# Patient Record
Sex: MALE | Race: BLACK OR AFRICAN AMERICAN | Employment: OTHER | URBAN - METROPOLITAN AREA
[De-identification: names, ages, dates, MRNs, and addresses within clinical notes are randomized per-mention and may not be internally consistent; named-entity substitution may affect disease eponyms.]

---

## 2018-04-28 PROBLEM — R55 SYNCOPE, NEAR: Status: ACTIVE | Noted: 2018-04-28

## 2018-08-16 ENCOUNTER — HOSPITAL ENCOUNTER (OUTPATIENT)
Dept: PHYSICAL THERAPY | Age: 63
Setting detail: THERAPIES SERIES
Discharge: HOME | End: 2018-08-16
Payer: OTHER GOVERNMENT

## 2018-08-22 ENCOUNTER — HOSPITAL ENCOUNTER (OUTPATIENT)
Dept: PHYSICAL THERAPY | Age: 63
Setting detail: THERAPIES SERIES
Discharge: HOME | End: 2018-08-22
Payer: OTHER GOVERNMENT

## 2018-08-22 PROCEDURE — G8979 MOBILITY GOAL STATUS: HCPCS

## 2018-08-22 PROCEDURE — 97530 THERAPEUTIC ACTIVITIES: CPT

## 2018-08-22 PROCEDURE — 97110 THERAPEUTIC EXERCISES: CPT

## 2018-08-22 PROCEDURE — G8978 MOBILITY CURRENT STATUS: HCPCS

## 2018-08-22 PROCEDURE — 97162 PT EVAL MOD COMPLEX 30 MIN: CPT

## 2018-08-22 NOTE — FLOWSHEET NOTE
abd add                             Gait train add           Other Therapeutic Activities:  Discussed pt findings and treatment - pt verbalized good understanding    Home Exercise Program:  Pt. demonstrated good understanding and knowledge of HEP. Written instructions provided.     08/22/18: sitting TrA isometric    Manual Treatments: NA     Modalities:  NA    Timed Code Treatment Minutes:  TE: 10, TA: 15    Total Treatment Minutes:  60 (+eval mod)    Treatment/Activity Tolerance:  [x] Patient tolerated treatment well [] Patient limited by fatigue  [] Patient limited by pain  [] Patient limited by other medical complications  [] Other:     Assessment:  Pt presents with LBP, decreased strength, balance, gait deficits, increased time for transfers limiting N walking, standing, sitting, reaching     Prognosis: [x] Good [] Fair  [] Poor    Patient Requires Follow-up: [x] Yes  [] No    Goals:  Short term goals  Time Frame for Short term goals: 3 weeks  Short term goal 1: Pt will demo good understanding and compliance of initial HEP  Short term goal 2: Decreased LBP 6/10 at worst to allow for improved tolerance to sitting  Short term goal 3: TUG = 39 seconds with 2WW for decreased fall risk  Short term goal 4: R shoulder AROM WFLs without increased back pain for N reaching  Long term goals  Time Frame for Long term goals : 6 weeks  Long term goal 1: Pt will demo good understanding and compliance of HEP progressions  Long term goal 2: Decreased LBP to 2/10 at worst to allow for sitting tolerance with rare complaints  Long term goal 3: B hip flexion 4/5, B quads with no quad lag with LAQ, B hip abd 5/5 in sitting to allow for easier sit<>stand transfers - decreased time to complete and improved hip flexion with gait with 2WW  Long term goal 4: TUG = 33 seconds with 2WW for decreased fall risk  Long term goal 5: Decreased impairment per modified oswestry <20% impaired    Plan:   [] Continue per plan of care [] Alter current plan (see comments)  [x] Plan of care initiated [] Hold pending MD visit [] Discharge    Plan for Next Session:  Review HEP, add exercises as tolerated    Electronically signed by:   Dane Butts DPT [id number]

## 2018-08-22 NOTE — PLAN OF CARE
Outpatient Physical Therapy  Phone: [phone] Fax: [phone]    To: Referring Practitioner: Dr. Francisco Vera  From: Lara Miller, PT, DPT [id number]   Date: 2018  Patient: Ewa Gil     : 1955 MRN: [id number]  Diagnosis: Diagnosis: chronic LBP M54.5   Treatment Diagnosis: Treatment Diagnosis: LBP s/p fall and kyphoplasty     Physical Therapy Certification/Re-Certification Form  Dear Dr. Judith Rogers,   The following patient has been evaluated for physical therapy services and for therapy to continue, Medicare requires monthly physician review of the treatment plan. Please review the attached evaluation and/or summary of the patient's plan of care, and verify that you agree therapy should continue by signing the attached document and sending it back to our office.     Plan of Care/Treatment to date:  [x] Therapeutic Exercise (Review/Progress HEP and provide verbal/tactile cueing for activities related to strengthening, flexibility,  endurance, ROM.)       [x] Therapeutic Activity (Provide verbal/tactile cueing for dynamic activities to promote functional tasks.)          [x] Gait Training (Provide verbal/tactile/visual cueing for facilitation of normalized gait pattern without or with the least restrictive AD to decrease pain and/or risk for falling.)          [x] Neuromuscular Re-education (Review/Progress HEP and provide verbal/tactile cueing for activities related to improving balance, coordination, kinesthetic sense, posture, motor skill, proprioception.)         [x] Manual Therapy (Provide manual therapy to mobilize soft tissue/joints for the purpose of modulating pain, promoting relaxation, increasing ROM, reducing/eliminating soft tissue swelling/inflammation/tightness, improving soft tissue extensibility)                [x] Modalities (For modulating pain/tenderness/paresthesias, reducing swelling/inflammation/tightness, improving soft tissue extensibility, and/or to increase muscle tone/strength):     [] Ultrasound  [] Electrical Stimulation        [] Cervical Traction [] Lumbar Traction    ? [] Cold/hotpack [] Iontophoresis   Other:      []          []      Assessment:  Conditions Requiring Skilled Therapeutic Intervention  Body structures, Functions, Activity limitations: Decreased functional mobility , Decreased strength, Decreased balance, Decreased endurance, Decreased coordination  Assessment: Pt presents with LBP, decreased strength, balance, gait deficits, increased time for transfers limiting N walking, standing, sitting  Treatment Diagnosis: LBP s/p fall and kyphoplasty  Prognosis: Good  Decision Making: Medium Complexity  REQUIRES PT FOLLOW UP: Yes    Goals:  Short term goals  Time Frame for Short term goals: 3 weeks  Short term goal 1: Pt will demo good understanding and compliance of initial HEP  Short term goal 2: Decreased LBP 6/10 at worst to allow for improved tolerance to sitting  Short term goal 3: TUG = 39 seconds with 2WW for decreased fall risk  Short term goal 4: R shoulder AROM WFLs without increased back pain for N reaching  Long term goals  Time Frame for Long term goals : 6 weeks  Long term goal 1: Pt will demo good understanding and compliance of HEP progressions  Long term goal 2: Decreased LBP to 2/10 at worst to allow for sitting tolerance with rare complaints  Long term goal 3: B hip flexion 4/5, B quads with no quad lag with LAQ, B hip abd 5/5 in sitting to allow for easier sit<>stand transfers - decreased time to complete and improved hip flexion with gait with 2WW  Long term goal 4: TUG = 33 seconds with 2WW for decreased fall risk  Long term goal 5: Decreased impairment per modified oswestry <20% impaired    Frequency/Duration: after IE:  # Days per week: [] 1 day # Weeks: [] 1 week [] 5 weeks     [x] 2 days?    [] 2 weeks [x] 6 weeks     [] 3 days   [] 3 weeks [] 7 weeks     [] 4 days   [] 4 weeks [] 8 weeks    Rehab Potential: [] Excellent [x] Good [] Fair  [] Poor       Electronically signed by: Nusrat Baig PT, DPT [id number]        If you have any questions or concerns, please don't hesitate to call.   Thank you for your referral.      Physician Signature:________________________________Date:__________________  By signing above, therapists plan is approved by physician

## 2018-08-22 NOTE — PROGRESS NOTES
Physical Therapy  Initial Assessment  Date: 2018  Patient Name: Ponce Miller  MRN: [id number]  : 1955     Treatment Diagnosis: LBP s/p fall and kyphoplasty    Restrictions  Restrictions/Precautions  Restrictions/Precautions:  (HTN, Hardware L thigh with prior L hip sx, C8 incomplete spinal cord injury with fusion, sarcoidosis, L2 compression fx with kyphoplasty, fall risk)    Subjective   General  Chart Reviewed: Yes  Patient assessed for rehabilitation services?: Yes  Additional Pertinent Hx: HTN, Hardware L thigh with prior L hip sx, C8 incomplete spinal cord injury with fusion, sarcoidosis, L2 compression fx with kyphoplasty, fall risk  Referring Practitioner: Dr. Jovita Joshi  Referral Date : 18  Diagnosis: chronic LBP M54.5  General Comment  Comments: MRI lumbar: Acute compression fracture of L2. Severe multilevel degenerative disc disease  PT Visit Information  Onset Date: 18  PT Insurance Information: [facility] - [place] + 12 visits (+1 re-eval if needed), TE, NMR, Gait, TA, MT, 6 visits US/heat/ice/ionto  Total # of Visits Approved: 13  Total # of Visits to Date: 1  Subjective  Subjective: Pt reports injury to back 18 when was walking up steps and L foot didn't get up on the step and he fell backwards. He went to ER and was found to have lumbar compression fx at L2 and had kyphoplasty 18. He felt the kyphoplasty didn't give him pain relief. Pt then was D/C home and did home PT for ~ 4 weeks. Pt reports has had 4 falls since he has been home - \"fall to knees\" with the most recently 1.5 months ago. Pt saw Dr Lisa Jorgensen again who then referred pt to OPPT. Pt reports pain to LB that is 5-10/10 and averages 7/10 that is aching. Numbness to R leg and R hand from previous SCI. Pt reports is limited in many activities as any movement causes more pain and no real relief. Pt is limited in transfers, walking, steps, reaching.   PLOF: walking with cane outside home and no AD in home chair    Assessment   Conditions Requiring Skilled Therapeutic Intervention  Body structures, Functions, Activity limitations: Decreased functional mobility ; Decreased strength;Decreased balance;Decreased endurance;Decreased coordination  Assessment: Pt presents with LBP, decreased strength, balance, gait deficits, increased time for transfers limiting N walking, standing, sitting, reaching  Treatment Diagnosis: LBP s/p fall and kyphoplasty  Prognosis: Good  Decision Making: Medium Complexity  REQUIRES PT FOLLOW UP: Yes  Activity Tolerance  Activity Tolerance: Patient Tolerated treatment well         Plan   Plan  Times per week: 2  Plan weeks: 6  Plan Comment: Plan of care initiated    G-Code  PT G-Codes  Functional Assessment Tool Used: modified oswestry  Score: 38% impaired  Functional Limitation: Mobility: Walking and moving around  Mobility: Walking and Moving Around Current Status (): At least 20 percent but less than 40 percent impaired, limited or restricted  Mobility: Walking and Moving Around Goal Status ():  At least 1 percent but less than 20 percent impaired, limited or restricted    Goals  Short term goals  Time Frame for Short term goals: 3 weeks  Short term goal 1: Pt will demo good understanding and compliance of initial HEP  Short term goal 2: Decreased LBP 6/10 at worst to allow for improved tolerance to sitting  Short term goal 3: TUG = 39 seconds with 2WW for decreased fall risk  Short term goal 4: R shoulder AROM WFLs without increased back pain for N reaching  Long term goals  Time Frame for Long term goals : 6 weeks  Long term goal 1: Pt will demo good understanding and compliance of HEP progressions  Long term goal 2: Decreased LBP to 2/10 at worst to allow for sitting tolerance with rare complaints  Long term goal 3: B hip flexion 4/5, B quads with no quad lag with LAQ, B hip abd 5/5 in sitting to allow for easier sit<>stand transfers - decreased time to complete and improved hip

## 2018-08-22 NOTE — PROGRESS NOTES
[]D. Because of my pain, my sleep is only 1/2 of my normal amount   []E. Because of my pain, my sleep is only 1/4 of my normal amount   []F. Pain prevents me from sleeping at all     SECTION 3 - Lifting  []A. I can lift heavy weights without increased pain  []B. I can lift heavy weights, but it causes increased pain  []C. Pain prevents me from lifting heavy weights off of the floor but I can manage if they are conveniently positioned (ex. On a table, etc.)  []D. Pain prevents me from lifting heavy weights off of the floor, but I can manage light to medium weights if they are conveniently positioned  [x]E. I can lift only very light weights  []F. I cannot lift or carry anything at all SECTION 8 - Social Life   [x]A. My social life is normal and does not increase with pain  []B. My social life is normal, but it increases my level of pain  []C. Pain prevents me from participating in more energetic activities (ex. Sports, dancing, etc.)  []D. Pain prevents me from going out very often  []E. Pain has restricted my social life to my home  []F. I have hardly any social life because of my pain   SECTION 4 - Walking  []A. I have no pain when walking  []B. I have pain when walking, but can still walk my required normal distances  []C. Pain prevents me from walking long distances   [x]D. Pain prevents me from walking intermediate distances  []E. Pain prevents me from walking even short distances  []F. Pain prevents me from walking at all SECTION 9 - Traveling  []A. I get no increased pain when traveling   [x]B. I get some pain while traveling, but none of my usual forms of travel make it any worse  []C. I get increased pain when traveling, but it does not cause me to seek alternative forms of travel  []D. I get increased pain when traveling, which causes me to seek alternative forms of travel  []E. My pain restricts all forms of travel except that which is done while I am lying down   []F.  My pain restricts all forms of travel   SECTION 5 - Sitting   []A. Sitting does not cause me any pain  []B. I can only sit as long as I like providing that I have my choice of seating surfaces  [x]C. Pain prevents me from sitting for more than 1 hour  []D. Pain prevents me from sitting for more than 1/2 hour  []E. Pain prevents me from sitting for more than 10 minutes  []F. Pain prevents me from sitting at all SECTION 10 - Employment/Homemaking  []A. My normal job/homemaking activities do not cause pain  []B. My normal job/homemaking activities increase my pain, but I can still perform all that is required of me  [x]C. I can perform most of my job/homemaking duties, but pain prevents me from performing more physically stressful activities (ex. Lifting, vacuuming, etc.)  []D. Pain prevents me from doing anything but light duties  []E. Pain prevents me from doing even light duties  []F. Pain prevents me from performing any job or homemaking chores     COMMENTS:     Patient Score: 19      Scoring Method for the Modified Oswestry Low Back Pain Disability Questionnaire      1. Each of the 10 sections is scored separately (0 to 5 points each) then added up (max. Total = 50). EXAMPLE:  Section 1. Pain Intensity  Item Score Item Description Point Value   A I have no pain at the moment 0   B The pain is very mild at the moment 1   C The pain is moderate at the moment 2   D The pain is fairly severe at the moment 3   E The pain is very severe at the moment 4   F The pain is the worst imaginable 5     2. If all 10 sections are completed, simply double the patient's score. 3. If a section is omitted, divide the patient's total score by the number of sections completed times 5. FORMULA: [(Patient's score) / (# Sections Completed X 5)] X 100 = ____% Disability    EXAMPLE:  If number of sections completed = 9  If patient's score = 22  The equation = [22 / (9 X 5)] X 100 = 48.9% Disability    4.  Interpretation of disability scores:    SCORE SCORE 0-20% Minimal Disability Can cope with most ADL's. Usually no treatment needed, apart from advice on lifting, sitting, posture, physical fitness and diet. In this group, some patients have particular difficulty with sitting and this may be important if their occupation is sedentary (, , etc.)    72-53% Moderate Disability This group experiences more pain and problems with sitting, lifting, and standing. Travel and social life are more difficult and may well be off work. Personal care, sexual activity, and sleeping ar not grossly affected, and the back condition can usually be managed by conservative means. 40-60% Severe   Disability Pain remains the main problem in this group of patients by travel, personal care, social life, sexual activity, and sleep are also affected. These patients require detailed investigation. 60-80% Crippled   Back pain impinges on all aspects of these patients' lives both at home and at work. Positive intervention is required. % Bed-bound or exaggerating These patients are either bed-bound or exaggerating their symptoms. This can be evaluated by careful observation of the patient during the medical examination.                           G-Code Crosswalk:  Oswestry Total Score Disability Index CMS Modifier   0 0% []CH   1-9 1-19% []CI   10-19 20-39% [x]CJ   20-29 40-59% []CK   30-39 60-79% []CL   40-49 80-99% []CM   50 100% []CN

## 2018-08-29 ENCOUNTER — HOSPITAL ENCOUNTER (OUTPATIENT)
Dept: PHYSICAL THERAPY | Age: 63
Setting detail: THERAPIES SERIES
Discharge: HOME | End: 2018-08-29
Payer: OTHER GOVERNMENT

## 2018-08-29 PROCEDURE — 97116 GAIT TRAINING THERAPY: CPT

## 2018-08-29 PROCEDURE — 97110 THERAPEUTIC EXERCISES: CPT

## 2018-09-05 ENCOUNTER — HOSPITAL ENCOUNTER (OUTPATIENT)
Dept: PHYSICAL THERAPY | Age: 63
Setting detail: THERAPIES SERIES
Discharge: HOME | End: 2018-09-05
Payer: OTHER GOVERNMENT

## 2018-09-05 PROCEDURE — 97116 GAIT TRAINING THERAPY: CPT

## 2018-09-05 PROCEDURE — 97110 THERAPEUTIC EXERCISES: CPT

## 2018-09-05 NOTE — FLOWSHEET NOTE
understanding and compliance of initial HEP  Short term goal 2: Decreased LBP 6/10 at worst to allow for improved tolerance to sitting  Short term goal 3: TUG = 39 seconds with 2WW for decreased fall risk  Short term goal 4: R shoulder AROM WFLs without increased back pain for N reaching  Long term goals  Time Frame for Long term goals : 6 weeks  Long term goal 1: Pt will demo good understanding and compliance of HEP progressions  Long term goal 2: Decreased LBP to 2/10 at worst to allow for sitting tolerance with rare complaints  Long term goal 3: B hip flexion 4/5, B quads with no quad lag with LAQ, B hip abd 5/5 in sitting to allow for easier sit<>stand transfers - decreased time to complete and improved hip flexion with gait with 2WW  Long term goal 4: TUG = 33 seconds with 2WW for decreased fall risk  Long term goal 5: Decreased impairment per modified oswestry <20% impaired    Plan:   [x] Continue per plan of care [] Alter current plan (see comments)  [] Plan of care initiated [] Hold pending MD visit [] Discharge    Plan for Next Session:  Review HEP, add exercises as tolerated    Electronically signed by:   Jelena Willoughby DPT [id number]

## 2018-09-07 ENCOUNTER — HOSPITAL ENCOUNTER (OUTPATIENT)
Dept: PHYSICAL THERAPY | Age: 63
Setting detail: THERAPIES SERIES
Discharge: HOME | End: 2018-09-07
Payer: OTHER GOVERNMENT

## 2018-09-10 ENCOUNTER — HOSPITAL ENCOUNTER (OUTPATIENT)
Dept: PHYSICAL THERAPY | Age: 63
Setting detail: THERAPIES SERIES
Discharge: HOME | End: 2018-09-10
Payer: OTHER GOVERNMENT

## 2018-09-10 PROCEDURE — 97110 THERAPEUTIC EXERCISES: CPT

## 2018-09-10 PROCEDURE — 97116 GAIT TRAINING THERAPY: CPT

## 2018-09-10 NOTE — FLOWSHEET NOTE
Poor    Patient Requires Follow-up: [x] Yes  [] No    Goals:  Short term goals  Time Frame for Short term goals: 3 weeks  Short term goal 1: Pt will demo good understanding and compliance of initial HEP  Short term goal 2: Decreased LBP 6/10 at worst to allow for improved tolerance to sitting  Short term goal 3: TUG = 39 seconds with 2WW for decreased fall risk  Short term goal 4: R shoulder AROM WFLs without increased back pain for N reaching  Long term goals  Time Frame for Long term goals : 6 weeks  Long term goal 1: Pt will demo good understanding and compliance of HEP progressions  Long term goal 2: Decreased LBP to 2/10 at worst to allow for sitting tolerance with rare complaints  Long term goal 3: B hip flexion 4/5, B quads with no quad lag with LAQ, B hip abd 5/5 in sitting to allow for easier sit<>stand transfers - decreased time to complete and improved hip flexion with gait with 2WW  Long term goal 4: TUG = 33 seconds with 2WW for decreased fall risk  Long term goal 5: Decreased impairment per modified oswestry <20% impaired    Plan:   [x] Continue per plan of care [] Alter current plan (see comments)  [] Plan of care initiated [] Hold pending MD visit [] Discharge    Plan for Next Session:  Review HEP, add exercises as tolerated    Electronically signed by:   Shalonda Carrasco DPT [id number]

## 2018-09-12 ENCOUNTER — HOSPITAL ENCOUNTER (OUTPATIENT)
Dept: PHYSICAL THERAPY | Age: 63
Setting detail: THERAPIES SERIES
Discharge: HOME | End: 2018-09-12
Payer: OTHER GOVERNMENT

## 2018-09-12 PROCEDURE — 97116 GAIT TRAINING THERAPY: CPT

## 2018-09-12 PROCEDURE — 97110 THERAPEUTIC EXERCISES: CPT

## 2018-09-12 NOTE — FLOWSHEET NOTE
activities as any movement causes more pain and no real relief. Pt is limited in transfers, walking, steps, reaching. PLOF: walking with cane outside home and no AD in home and uses manual W/C for long distances and was able to drive. However, [address] 2016 and hasn't driven since, had L hip sx and was getting well, home May 2017 and he was back to practicing steps. He was walking with 2WW and progressing back to cane and then the fall with back injury occurred and is now back to using 2WW for short distance ambulation and electric scooter outside home. Pt with past history of C8 SCI tetraplegia with initial injury in the 1980s when in [employer] after a fall 50 feet and started to affect him in the 90s and went on disability 1999. He was affected in balance and weakness in legs. Pt has had 3 scooters since 2000.       08/29/18: Saw Dr. Christina Sanchez earlier this week and thought healing would just take time, x-ray wouldn't show much of healing process just the kyphoplasty. F/U after PT.       09/05/18: Pt reported good tolerance after last visit and new HEP going well. Increased pain noted with getting up 1st in AM or with getting up after prolonged sitting.      09/10/18: Pt feels has increased pain today likely due to rainy weather since the weekend. 09/12/18: Pt reports no real pain at rest.  Felt good after last session. He reports he is able to stand more upright without causing pain. Objective: 08/22/18  Observation:    Observation: Pt arrived to clinic in electric scooter.   Ambulates with 2WW with scissoring gait pattern, no heel-toe gait pattern noted with B foot drag, decreased hip flexion B, decreased B step lengths and heavy reliance on walker with slow gait speed  Body Mechanics: Pt able to transfer himself from electric scooter to [facility] with increased time to complete    Test measurements:     AROM RUE (degrees)  RUE General AROM: R shoulder flexion limited 25% due to back pain     Strength RLE  Comment: DF 5/5, hip flexion 3-/5, HS 5/5, quad with 30 deg quad lag with LAQ , hip abd 4-/5 in sitting  Strength LLE  Comment: DF 5/5, hip flexion 3/5, HS 5/5, quad with 20 deg quad lag with LAQ, hip abd 4-/5 in sitting  Additional Measures  Special Tests: TUG = 46.44 seconds and 42.91 seconds with 2WW - increased time getting out of chair    09/12/18: upright posture noted in standing without pain    Exercises: *caution due to hx L2 compression fx*  Exercise/Equipment Resistance/Repetitions Other comments        Nustep, seat 14, arms 13, Lv 1 10 mins To facilitate reciprocal movement  Helped loosen back up   Sit to stand with TrA 1 X 15: 23.5 inches from floor  2 x 15: 23 inches from floor No cues for hand placement today   Sitting marches with opp arm raise with TrA  3 X 15 Cues to not lean posterior   SAQ, elevated HOB with black bolster 3\", 3 x 15 PT to assist with endrange on R   Sitting hip abd with TrA Red 3 x 15    Sitting cane flexion 3 X 15 with PT assist R elbow to prevent flexion    Self R shoulder flexion 3-5\" x 10 Use left hand at right elbow to assist                  Gait train 30' x 6 consecutively, no rest breaks, SBA    30' x 6 - consecutively, no rest breaks  SBA Initially not dragging R foot but on lap 4 required cues           Other Therapeutic Activities:      Home Exercise Program:  Pt. demonstrated good understanding and knowledge of HEP. Written instructions provided. 08/22/18: sitting TrA isometric  08/29/18:  TrA with hip abd bands in sitting, TrA with sitting marches  09/05/18: SAQ    Manual Treatments: NA     Modalities:  NA    Timed Code Treatment Minutes:  TE: 41, Gait: 12    Total Treatment Minutes:  53     Treatment/Activity Tolerance:  [x] Patient tolerated treatment well [] Patient limited by fatigue  [] Patient limited by pain  [] Patient limited by other medical complications   [] Other:     Assessment: good tolerance to exercises    Prognosis: [x] Good [] Fair  [] Poor    Patient Requires Follow-up: [x] Yes  [] No    Goals:  Short term goals  Time Frame for Short term goals: 3 weeks  Short term goal 1: Pt will demo good understanding and compliance of initial HEP  Short term goal 2: Decreased LBP 6/10 at worst to allow for improved tolerance to sitting  Short term goal 3: TUG = 39 seconds with 2WW for decreased fall risk  Short term goal 4: R shoulder AROM WFLs without increased back pain for N reaching  Long term goals  Time Frame for Long term goals : 6 weeks  Long term goal 1: Pt will demo good understanding and compliance of HEP progressions  Long term goal 2: Decreased LBP to 2/10 at worst to allow for sitting tolerance with rare complaints  Long term goal 3: B hip flexion 4/5, B quads with no quad lag with LAQ, B hip abd 5/5 in sitting to allow for easier sit<>stand transfers - decreased time to complete and improved hip flexion with gait with 2WW  Long term goal 4: TUG = 33 seconds with 2WW for decreased fall risk  Long term goal 5: Decreased impairment per modified oswestry <20% impaired    Plan:   [x] Continue per plan of care [] Alter current plan (see comments)  [] Plan of care initiated [] Hold pending MD visit [] Discharge    Plan for Next Session:  Review HEP, add exercises as tolerated    Electronically signed by:   Farzana Asif DPT [id number]

## 2018-09-17 ENCOUNTER — HOSPITAL ENCOUNTER (OUTPATIENT)
Dept: PHYSICAL THERAPY | Age: 63
Setting detail: THERAPIES SERIES
Discharge: HOME | End: 2018-09-17
Payer: OTHER GOVERNMENT

## 2018-09-19 ENCOUNTER — HOSPITAL ENCOUNTER (OUTPATIENT)
Dept: PHYSICAL THERAPY | Age: 63
Setting detail: THERAPIES SERIES
Discharge: HOME | End: 2018-09-19
Payer: OTHER GOVERNMENT

## 2018-09-19 PROCEDURE — 97116 GAIT TRAINING THERAPY: CPT

## 2018-09-19 PROCEDURE — 97110 THERAPEUTIC EXERCISES: CPT

## 2018-09-19 NOTE — FLOWSHEET NOTE
Physical Therapy Daily Treatment Note  Date:  2018    Patient Name:  Yvonne Owusu    :  1955  MRN: [id number]  Restrictions/Precautions:  HTN, Hardware L thigh with prior L hip sx, C8 incomplete spinal cord injury with fusion, sarcoidosis, L2 compression fx with kyphoplasty, fall risk  Medical/Treatment Diagnosis Information:  · Diagnosis: chronic LBP M54.5  · Treatment Diagnosis: LBP s/p fall and kyphoplasty  Insurance/Certification information:  PT Insurance Information: [facility] [place] + 12 visits (+1 re-eval if needed), TE, NMR, Gait, TA, MT, 6 visits US/heat/ice/ionto  Physician Information:  Referring Practitioner: Dr. Kristen Stephens MD Follow-up: ~   Plan of care signed (Y/N):  Y  Visit# / total visits:    Pain level: 6/10 with movement     G-Code noted on 2018:    PT G-Codes   Functional Assessment Tool Used: modified oswestry  Score: 38% impaired  Functional Limitation: Mobility: Walking and moving around  Mobility: Walking and Moving Around Current Status (): At least 20 percent but less than 40 percent impaired, limited or restricted  Mobility: Walking and Moving Around Goal Status (): At least 1 percent but less than 20 percent impaired, limited or restricted    Progress Note: []  Yes  [x]  No  Next due by: Visit #10      Subjective: 18: Pt reports injury to back 18 when was walking up steps and L foot didn't get up on the step and he fell backwards. He went to ER and was found to have lumbar compression fx at L2 and had kyphoplasty 18. He felt the kyphoplasty didn't give him pain relief. Pt then was D/C home and did home PT for ~ 4 weeks. Pt reports has had 4 falls since he has been home - \"fall to knees\" with the most recently 1.5 months ago. Pt saw Dr Kerline Son again who then referred pt to OPPT. Pt reports pain to LB that is 5-10/10 and averages 7/10 that is aching. Numbness to R leg and R hand from previous SCI.   Pt reports is limited in

## 2018-09-24 ENCOUNTER — HOSPITAL ENCOUNTER (OUTPATIENT)
Dept: PHYSICAL THERAPY | Age: 63
Setting detail: THERAPIES SERIES
Discharge: HOME | End: 2018-09-24
Payer: OTHER GOVERNMENT

## 2018-09-24 NOTE — CARE COORDINATION
Physical Therapy  Cancellation/No-show Note  Patient Name:  Roxy Obregon  :  1955   Date:  2018  MRN: [id number]  Cancelled visits to date: 3  18  No-shows to date: 0    For today's appointment patient:  [x]  Cancelled  []  Rescheduled appointment  []  No-show     Reason given by patient:  []  Patient ill  []  Conflicting appointment  []  No transportation    []  Conflict with work  []  No reason given  [x]  Other:     Comments: not feeling up to it      Electronically signed by:   Sanket Cormier, [address] [facility], DPT [id number]

## 2018-09-26 ENCOUNTER — APPOINTMENT (OUTPATIENT)
Dept: PHYSICAL THERAPY | Age: 63
End: 2018-09-26
Payer: OTHER GOVERNMENT

## 2018-09-27 ENCOUNTER — HOSPITAL ENCOUNTER (OUTPATIENT)
Dept: PHYSICAL THERAPY | Age: 63
Setting detail: THERAPIES SERIES
Discharge: HOME | End: 2018-09-27
Payer: OTHER GOVERNMENT

## 2018-09-27 NOTE — CARE COORDINATION
Physical Therapy  Cancellation/No-show Note  Patient Name:  Renetta Handley  :     Date:  2018  MRN: [id number]  Cancelled visits to date: 4  18  No-shows to date: 0    For today's appointment patient:  [x]  Cancelled  []  Rescheduled appointment  []  No-show     Reason given by patient:  [x]  Patient ill - body not feeling up to it  []  Conflicting appointment  []  No transportation    []  Conflict with work  []  No reason given  []  Other:     Comments:       Electronically signed by:   Shaun Baker, DPT [id number]

## 2018-10-02 ENCOUNTER — HOSPITAL ENCOUNTER (OUTPATIENT)
Dept: PHYSICAL THERAPY | Age: 63
Setting detail: THERAPIES SERIES
Discharge: HOME | End: 2018-10-02
Payer: OTHER GOVERNMENT

## 2018-10-02 PROCEDURE — 97116 GAIT TRAINING THERAPY: CPT

## 2018-10-02 PROCEDURE — 97110 THERAPEUTIC EXERCISES: CPT

## 2018-10-02 NOTE — PROGRESS NOTES
constant in LB and worst complaints in AM and at end of day. No longer with pain getting up from sitting. TUG = 28.86 seconds and 26.17 seconds with 2WW. Significant improvement with TUG since IE as pt is quicker with sit to stand due to less LBP. R shoulder flexion limited 15% vs L. Ambulates with 2WW with cues for decreasing foot drag R > L. Progress towards goals:    Short term goals  Time Frame for Short term goals: 3 weeks  Short term goal 1: Pt will demo good understanding and compliance of initial HEP MET  Short term goal 2: Decreased LBP 6/10 at worst to allow for improved tolerance to sitting nearly met  Short term goal 3: TUG = 39 seconds with 2WW for decreased fall risk MET  Short term goal 4: R shoulder AROM WFLs without increased back pain for N reaching partially met    Frequency/Duration: after IE  # Days per week: [] 1 day # Weeks: [] 1 week [] 4 weeks      [x] 2 days? [] 2 weeks [] 5 weeks      [] 3 days   [] 3 weeks [x] 6 weeks     Rehab Potential: [] Excellent [x] Good [] Fair  [] Poor     Goal Status:  [] Achieved [x] Partially Achieved  [] Not Achieved     Patient Status: [x] Continue per initial plan of Care, 6 visits remain on initial POC. Pt has made good progress with decreased LBP and improving mobility with transfers and gait although still with LE weakness and back pain if moves a certain way requiring pt to still use 2WW. Further improvement expected with additional therapy. [] Patient now discharged     [] Additional visits requested, Please re-certify for additional visits:      Requested frequency/duration:  X/week for weeks    Electronically signed by: Vicente Longoria PT, DPT [id number]    If you have any questions or concerns, please don't hesitate to call.   Thank you for your referral.    Physician Signature:________________________________Date:__________________  By signing above, therapists plan is approved by physician

## 2018-10-02 NOTE — FLOWSHEET NOTE
Physical Therapy Daily Treatment Note  Date:  10/2/2018    Patient Name:  Norma Bacon    :  1955  MRN: [id number]  Restrictions/Precautions:  HTN, Hardware L thigh with prior L hip sx, C8 incomplete spinal cord injury with fusion, sarcoidosis, L2 compression fx with kyphoplasty, fall risk  Medical/Treatment Diagnosis Information:  · Diagnosis: chronic LBP M54.5  · Treatment Diagnosis: LBP s/p fall and kyphoplasty  Insurance/Certification information:  PT Insurance Information: [facility] [place] + 12 visits (+1 re-eval if needed), TE, NMR, Gait, TA, MT, 6 visits US/heat/ice/ionto  Physician Information:  Referring Practitioner: Dr. Jerri Shea MD Follow-up: ~   Plan of care signed (Y/N):  Y  Visit# / total visits:    Pain level: 7/10 with movement     G-Code noted on 2018:    PT G-Codes   Functional Assessment Tool Used: modified oswestry  Score: 38% impaired  Functional Limitation: Mobility: Walking and moving around  Mobility: Walking and Moving Around Current Status (): At least 20 percent but less than 40 percent impaired, limited or restricted  Mobility: Walking and Moving Around Goal Status (): At least 1 percent but less than 20 percent impaired, limited or restricted    Progress Note: []  Yes  [x]  No  Next due by: Visit #10      Subjective: 18: Pt reports injury to back 18 when was walking up steps and L foot didn't get up on the step and he fell backwards. He went to ER and was found to have lumbar compression fx at L2 and had kyphoplasty 18. He felt the kyphoplasty didn't give him pain relief. Pt then was D/C home and did home PT for ~ 4 weeks. Pt reports has had 4 falls since he has been home - \"fall to knees\" with the most recently 1.5 months ago. Pt saw Dr Ana Dominguez again who then referred pt to OPPT. Pt reports pain to LB that is 5-10/10 and averages 7/10 that is aching. Numbness to R leg and R hand from previous SCI.   Pt reports is limited in many activities as any movement causes more pain and no real relief. Pt is limited in transfers, walking, steps, reaching. PLOF: walking with cane outside home and no AD in home and uses manual W/C for long distances and was able to drive. However, [address] 2016 and hasn't driven since, had L hip sx and was getting well, home May 2017 and he was back to practicing steps. He was walking with 2WW and progressing back to cane and then the fall with back injury occurred and is now back to using 2WW for short distance ambulation and electric scooter outside home. Pt with past history of C8 SCI tetraplegia with initial injury in the 1980s when in [employer] after a fall 50 feet and started to affect him in the 90s and went on disability 1999. He was affected in balance and weakness in legs. Pt has had 3 scooters since 2000.       08/29/18: Saw Dr. Almaz Salomon earlier this week and thought healing would just take time, x-ray wouldn't show much of healing process just the kyphoplasty. F/U after PT.       09/05/18: Pt reported good tolerance after last visit and new HEP going well. Increased pain noted with getting up 1st in AM or with getting up after prolonged sitting.      09/10/18: Pt feels has increased pain today likely due to rainy weather since the weekend. 09/12/18: Pt reports no real pain at rest.  Felt good after last session. He reports he is able to stand more upright without causing pain. 09/19/18:  Pt states back has been feeling better - able to stand taller with less pain. Does report fall on Saturday when coming in from porch to inside and stepped over the ledge, didn't have walker and was talking with someone and lost concentration and fell. Reports no injuries and was able to get himself up without assistance. 10/02/18: Pain last week 8-10/10 due to weather to LB but also achy \"all over\" due to rain. Since starting therapy average pain 6/10.   Pain no longer constant and worst complaints in AM tolerated    Electronically signed by:   Aaliyah Muller, DPT [id number]

## 2018-10-12 ENCOUNTER — HOSPITAL ENCOUNTER (OUTPATIENT)
Dept: PHYSICAL THERAPY | Age: 63
Setting detail: THERAPIES SERIES
Discharge: HOME | End: 2018-10-12
Payer: OTHER GOVERNMENT

## 2018-10-12 PROCEDURE — 97110 THERAPEUTIC EXERCISES: CPT

## 2018-10-12 PROCEDURE — 97116 GAIT TRAINING THERAPY: CPT

## 2018-10-12 NOTE — FLOWSHEET NOTE
impaired    Plan:   [x] Continue per plan of care [] Alter current plan (see comments)  [] Plan of care initiated [] Hold pending MD visit [] Discharge    Plan for Next Session:  Review HEP, add exercises as tolerated    Electronically signed by:   Vaughn Beltrán DPT [id number]

## 2018-10-17 ENCOUNTER — HOSPITAL ENCOUNTER (OUTPATIENT)
Dept: PHYSICAL THERAPY | Age: 63
Setting detail: THERAPIES SERIES
Discharge: HOME | End: 2018-10-17
Payer: OTHER GOVERNMENT

## 2018-10-17 PROCEDURE — 97116 GAIT TRAINING THERAPY: CPT

## 2018-10-17 PROCEDURE — 97110 THERAPEUTIC EXERCISES: CPT

## 2018-10-17 NOTE — FLOWSHEET NOTE
and at end of day. No longer with pain getting up from sitting. 10/12/18: Back pain has been a lot better. Feels 75% improved from IE.        10/17/18:  Pt feels is a little more sore today due to colder weather. Also feels increased stiffness. Objective: 08/22/18  Observation:    Observation: Pt arrived to clinic in electric scooter. Ambulates with 2WW with scissoring gait pattern, no heel-toe gait pattern noted with B foot drag, decreased hip flexion B, decreased B step lengths and heavy reliance on walker with slow gait speed  Body Mechanics: Pt able to transfer himself from electric scooter to [facility] with increased time to complete    Test measurements:     AROM RUE (degrees)  RUE General AROM: R shoulder flexion limited 25% due to back pain     Strength RLE  Comment: DF 5/5, hip flexion 3-/5, HS 5/5, quad with 30 deg quad lag with LAQ , hip abd 4-/5 in sitting  Strength LLE  Comment: DF 5/5, hip flexion 3/5, HS 5/5, quad with 20 deg quad lag with LAQ, hip abd 4-/5 in sitting  Additional Measures  Special Tests: TUG = 46.44 seconds and 42.91 seconds with 2WW - increased time getting out of chair    09/12/18: upright posture noted in standing without pain  09/19/18: LAQ R with 20 deg quad lag, L 15 deg quad lag  10/02/18: TUG = 28.86 seconds and 26.17 seconds with 2WW. R shoulder flexion limited 15% vs L. Ambulates with 2WW with cues for decreasing foot drag R > L.       Exercises: *caution due to hx L2 compression fx*  Exercise/Equipment Resistance/Repetitions Other comments        Nustep, seat 14, arms 13, Lv 1 15 mins To facilitate reciprocal movement  Helped loosen back up   Sit to stand with TrA 3 x 15: 21 inches from floor No cues for hand placement today   Sitting marches with opp arm raise with TrA  3 X 15    No assist on R today - indicating improving strength   Sitting hip abd with TrA Red 3 x 15 Try green next visit   Sitting cane flexion 3 X 15 with PT assist R elbow to prevent flexion 4: TUG = 33 seconds with 2WW for decreased fall risk  Long term goal 5: Decreased impairment per modified oswestry <20% impaired    Plan:   [x] Continue per plan of care [] Alter current plan (see comments)  [] Plan of care initiated [] Hold pending MD visit [] Discharge    Plan for Next Session:  Review HEP, add exercises as tolerated    Electronically signed by:   Dotty Klein DPT [id number]

## 2018-10-19 ENCOUNTER — HOSPITAL ENCOUNTER (OUTPATIENT)
Dept: PHYSICAL THERAPY | Age: 63
Setting detail: THERAPIES SERIES
Discharge: HOME | End: 2018-10-19
Payer: OTHER GOVERNMENT

## 2018-10-19 NOTE — CARE COORDINATION
Physical Therapy  Cancellation/No-show Note  Patient Name:  Mervin Krause  :     Date:  10/19/2018  MRN: [id number]  Cancelled visits to date: 5  10/19/18  09/27/18  09/24/18  09/17/18  09/07/18  No-shows to date: 0    For today's appointment patient:  [x]  Cancelled  []  Rescheduled appointment  []  No-show     Reason given by patient:  []  Patient ill   []  Conflicting appointment  []  No transportation    []  Conflict with work  []  No reason given  [x]  Other:     Comments:  Having someone come to fix thing in house     Electronically signed by:   Jayant Deras, [address] [facility], DPT [id number]

## 2018-10-22 ENCOUNTER — HOSPITAL ENCOUNTER (OUTPATIENT)
Dept: PHYSICAL THERAPY | Age: 63
Setting detail: THERAPIES SERIES
Discharge: HOME | End: 2018-10-22
Payer: OTHER GOVERNMENT

## 2018-10-22 NOTE — CARE COORDINATION
Physical Therapy  Cancellation/No-show Note  Patient Name:  Lyndon Valencia  :     Date:  10/22/2018  MRN: [id number]  Cancelled visits to date: 6  10/22/18  10/19/18  09/27/18  09/24/18  09/17/18  09/07/18  No-shows to date: 0    For today's appointment patient:  [x]  Cancelled  []  Rescheduled appointment  []  No-show     Reason given by patient:  []  Patient ill   []  Conflicting appointment  []  No transportation    []  Conflict with work  []  No reason given  [x]  Other:     Comments: fell this AM in bathtub and had to call paramedics to help him - he is okay and will be here at next scheduled visit    Electronically signed by:   Shaun Roberts, DPT [id number]

## 2018-10-24 ENCOUNTER — HOSPITAL ENCOUNTER (OUTPATIENT)
Dept: PHYSICAL THERAPY | Age: 63
Setting detail: THERAPIES SERIES
Discharge: HOME | End: 2018-10-24
Payer: OTHER GOVERNMENT

## 2018-10-24 PROCEDURE — 97116 GAIT TRAINING THERAPY: CPT

## 2018-10-24 PROCEDURE — G8979 MOBILITY GOAL STATUS: HCPCS

## 2018-10-24 PROCEDURE — 97530 THERAPEUTIC ACTIVITIES: CPT

## 2018-10-24 PROCEDURE — G8978 MOBILITY CURRENT STATUS: HCPCS

## 2018-10-24 PROCEDURE — 97110 THERAPEUTIC EXERCISES: CPT

## 2018-10-24 NOTE — PROGRESS NOTES
travel   SECTION 5 - Sitting   [x]A. Sitting does not cause me any pain  []B. I can only sit as long as I like providing that I have my choice of seating surfaces  []C. Pain prevents me from sitting for more than 1 hour  []D. Pain prevents me from sitting for more than 1/2 hour  []E. Pain prevents me from sitting for more than 10 minutes  []F. Pain prevents me from sitting at all SECTION 10 - Employment/Homemaking  []A. My normal job/homemaking activities do not cause pain  []B. My normal job/homemaking activities increase my pain, but I can still perform all that is required of me  [x]C. I can perform most of my job/homemaking duties, but pain prevents me from performing more physically stressful activities (ex. Lifting, vacuuming, etc.)  []D. Pain prevents me from doing anything but light duties  []E. Pain prevents me from doing even light duties  []F. Pain prevents me from performing any job or homemaking chores     COMMENTS:     Patient Score: 17      Scoring Method for the Modified Oswestry Low Back Pain Disability Questionnaire      1. Each of the 10 sections is scored separately (0 to 5 points each) then added up (max. Total = 50). EXAMPLE:  Section 1. Pain Intensity  Item Score Item Description Point Value   A I have no pain at the moment 0   B The pain is very mild at the moment 1   C The pain is moderate at the moment 2   D The pain is fairly severe at the moment 3   E The pain is very severe at the moment 4   F The pain is the worst imaginable 5     2. If all 10 sections are completed, simply double the patient's score. 3. If a section is omitted, divide the patient's total score by the number of sections completed times 5. FORMULA: [(Patient's score) / (# Sections Completed X 5)] X 100 = ____% Disability    EXAMPLE:  If number of sections completed = 9  If patient's score = 22  The equation = [22 / (9 X 5)] X 100 = 48.9% Disability    4.  Interpretation of disability scores:    SCORE SCORE

## 2018-10-29 ENCOUNTER — HOSPITAL ENCOUNTER (OUTPATIENT)
Dept: PHYSICAL THERAPY | Age: 63
Setting detail: THERAPIES SERIES
Discharge: HOME | End: 2018-10-29
Payer: OTHER GOVERNMENT

## 2018-11-02 ENCOUNTER — HOSPITAL ENCOUNTER (OUTPATIENT)
Dept: PHYSICAL THERAPY | Age: 63
Setting detail: THERAPIES SERIES
Discharge: HOME | End: 2018-11-02
Payer: OTHER GOVERNMENT

## 2018-11-02 PROCEDURE — 97116 GAIT TRAINING THERAPY: CPT

## 2018-11-02 PROCEDURE — 97110 THERAPEUTIC EXERCISES: CPT

## 2018-11-02 NOTE — FLOWSHEET NOTE
and at end of day. No longer with pain getting up from sitting. 10/12/18: Back pain has been a lot better. Feels 75% improved from IE.        10/17/18:  Pt feels is a little more sore today due to colder weather. Also feels increased stiffness. 10/24/18: Pt notes fall in tub Monday when went to sit on shower seat and didn't have his back to seat like he usually has. Back is scratched, hit L shoulder on faucet and hit head. Back felt stiffer after. Back is not as bad today as Monday after fall. Denies HA/dizziness currently but did have an episode of dizziness last night but has since resolved. 11/02/18:  Feels really stiff today. Is feeling better than last week - BP has been normal this week. Saw Dr. Paulette White earlier today and pleased with progress. To F/U after the first of the year. Objective: 08/22/18  Observation:    Observation: Pt arrived to clinic in electric scooter. Ambulates with 2WW with scissoring gait pattern, no heel-toe gait pattern noted with B foot drag, decreased hip flexion B, decreased B step lengths and heavy reliance on walker with slow gait speed  Body Mechanics: Pt able to transfer himself from electric scooter to [facility] with increased time to complete    Test measurements:     AROM RUE (degrees)  RUE General AROM: R shoulder flexion limited 25% due to back pain     Strength RLE  Comment: DF 5/5, hip flexion 3-/5, HS 5/5, quad with 30 deg quad lag with LAQ , hip abd 4-/5 in sitting  Strength LLE  Comment: DF 5/5, hip flexion 3/5, HS 5/5, quad with 20 deg quad lag with LAQ, hip abd 4-/5 in sitting  Additional Measures  Special Tests: TUG = 46.44 seconds and 42.91 seconds with 2WW - increased time getting out of chair    09/12/18: upright posture noted in standing without pain  09/19/18: LAQ R with 20 deg quad lag, L 15 deg quad lag  10/02/18: TUG = 28.86 seconds and 26.17 seconds with 2WW. R shoulder flexion limited 15% vs L.   Ambulates with 2WW with cues for

## 2018-12-05 ENCOUNTER — HOSPITAL ENCOUNTER (OUTPATIENT)
Dept: PHYSICAL THERAPY | Age: 63
Setting detail: THERAPIES SERIES
Discharge: HOME | End: 2018-12-05
Payer: OTHER GOVERNMENT

## 2018-12-07 NOTE — PROGRESS NOTES
goal 4: R shoulder AROM WFLs without increased back pain for N reaching nearly met  Long term goals  Time Frame for Long term goals : 6 weeks  Long term goal 1: Pt will demo good understanding and compliance of HEP progressions MET  Long term goal 2: Decreased LBP to 2/10 at worst to allow for sitting tolerance with rare complaints partially met  Long term goal 3: B hip flexion 4/5, B quads with no quad lag with LAQ, B hip abd 5/5 in sitting to allow for easier sit<>stand transfers - decreased time to complete and improved hip flexion with gait with 2WW partially met  Long term goal 4: TUG = 33 seconds with 2WW for decreased fall risk MET  Long term goal 5: Decreased impairment per modified oswestry <20% impaired not met     Goal Status:  [] Achieved [x] Partially Achieved  [] Not Achieved     Patient Status: [x] Patient now discharged, pt has made good progress towards goals and after last seen was going to [facility] for appointments, chart was held. Will now D/C. Recommend pt continue with HEP as instructed and F/U with Dr as needed. Electronically signed by:   Kiki Mendoza, [facility], DPT [id number]

## 2018-12-23 ENCOUNTER — APPOINTMENT (OUTPATIENT)
Dept: CT IMAGING | Age: 63
DRG: 378 | End: 2018-12-23
Payer: OTHER GOVERNMENT

## 2018-12-23 ENCOUNTER — HOSPITAL ENCOUNTER (INPATIENT)
Age: 63
LOS: 3 days | Discharge: HOME | DRG: 378 | End: 2018-12-26
Attending: EMERGENCY MEDICINE | Admitting: FAMILY MEDICINE
Payer: OTHER GOVERNMENT

## 2018-12-23 DIAGNOSIS — R07.89 RIGHT-SIDED CHEST WALL PAIN: ICD-10-CM

## 2018-12-23 DIAGNOSIS — K92.2 LOWER GI BLEED: Primary | ICD-10-CM

## 2018-12-23 PROBLEM — K62.5 BRIGHT RED RECTAL BLEEDING: Status: ACTIVE | Noted: 2018-12-23

## 2018-12-23 PROBLEM — K62.5 RECTAL BLEEDING: Status: ACTIVE | Noted: 2018-12-23

## 2018-12-23 PROBLEM — K57.90 DIVERTICULOSIS: Status: ACTIVE | Noted: 2018-12-23

## 2018-12-23 PROBLEM — I10 HTN (HYPERTENSION): Chronic | Status: ACTIVE | Noted: 2018-12-23

## 2018-12-23 LAB
ABO/RH: NORMAL
ALBUMIN SERPL-MCNC: 3.7 G/DL (ref 3.4–5)
ALP BLD-CCNC: 112 U/L (ref 40–129)
ALT SERPL-CCNC: 21 U/L (ref 10–40)
ANTIBODY SCREEN: NORMAL
AST SERPL-CCNC: 18 U/L (ref 15–37)
B-TYPE NATRIURETIC PEPTIDE: <15 PG/ML (ref 0–99.9)
BASOPHILS ABSOLUTE: 0.1 K/UL (ref 0–0.2)
BASOPHILS RELATIVE PERCENT: 0.8 %
BILIRUB SERPL-MCNC: 0.3 MG/DL (ref 0–1)
BILIRUBIN DIRECT: <0.2 MG/DL (ref 0–0.3)
BILIRUBIN URINE: NEGATIVE MG/DL
BILIRUBIN, INDIRECT: NORMAL MG/DL (ref 0–1)
BLOOD, URINE: NEGATIVE
CALCIUM IONIZED: 1.21 MMOL/L (ref 1.12–1.32)
CLARITY: NORMAL
CO2: 26 MMOL/L (ref 21–32)
COLOR: NORMAL
EOSINOPHILS ABSOLUTE: 0.1 K/UL (ref 0–0.6)
EOSINOPHILS RELATIVE PERCENT: 0.9 %
GFR AFRICAN AMERICAN: >60
GFR NON-AFRICAN AMERICAN: 56
GLUCOSE BLD-MCNC: 104 MG/DL (ref 70–99)
GLUCOSE URINE: NEGATIVE MG/DL
HCT VFR BLD CALC: 39 % (ref 40.5–52.5)
HEMOGLOBIN: 10.9 G/DL (ref 13.5–17.5)
HEMOGLOBIN: 11.8 G/DL (ref 13.5–17.5)
HEMOGLOBIN: 12.2 G/DL (ref 13.5–17.5)
INR BLD: 1.09 (ref 0.86–1.14)
KETONES, URINE: NEGATIVE MG/DL
LACTATE: 1.46 MMOL/L (ref 0.4–2)
LEUKOCYTE ESTERASE, URINE: NEGATIVE
LIPASE: 26 U/L (ref 13–60)
LYMPHOCYTES ABSOLUTE: 1.2 K/UL (ref 1–5.1)
LYMPHOCYTES RELATIVE PERCENT: 11.8 %
MCH RBC QN AUTO: 25.8 PG (ref 26–34)
MCHC RBC AUTO-ENTMCNC: 31.3 G/DL (ref 31–36)
MCV RBC AUTO: 82.4 FL (ref 80–100)
MICROSCOPIC EXAMINATION: NORMAL
MONOCYTES ABSOLUTE: 0.8 K/UL (ref 0–1.3)
MONOCYTES RELATIVE PERCENT: 7.7 %
NEUTROPHILS ABSOLUTE: 7.9 K/UL (ref 1.7–7.7)
NEUTROPHILS RELATIVE PERCENT: 78.8 %
NITRITE, URINE: NEGATIVE
PDW BLD-RTO: 14.7 % (ref 12.4–15.4)
PERFORMED ON: ABNORMAL
PERFORMED ON: NORMAL
PERFORMED ON: NORMAL
PH UA: 6
PLATELET # BLD: 205 K/UL (ref 135–450)
PMV BLD AUTO: 7.6 FL (ref 5–10.5)
POC ANION GAP: 10 (ref 10–20)
POC BUN: 18 MG/DL (ref 7–18)
POC CHLORIDE: 104 MMOL/L (ref 99–110)
POC CREATININE: 1.3 MG/DL (ref 0.8–1.3)
POC OCCULT BLOOD STOOL: POSITIVE
POC POTASSIUM: 4.3 MMOL/L (ref 3.5–5.1)
POC SAMPLE TYPE: ABNORMAL
POC SAMPLE TYPE: NORMAL
POC SAMPLE TYPE: NORMAL
POC SODIUM: 140 MMOL/L (ref 136–145)
PROTEIN UA: NEGATIVE MG/DL
PROTHROMBIN TIME: 12.4 SEC (ref 9.8–13)
RBC # BLD: 4.74 M/UL (ref 4.2–5.9)
SPECIFIC GRAVITY UA: 1.01
TOTAL PROTEIN: 6.7 G/DL (ref 6.4–8.2)
UROBILINOGEN, URINE: 0.2 E.U./DL
WBC # BLD: 10 K/UL (ref 4–11)

## 2018-12-23 PROCEDURE — 6360000002 HC RX W HCPCS: Performed by: PHYSICIAN ASSISTANT

## 2018-12-23 PROCEDURE — 85018 HEMOGLOBIN: CPT

## 2018-12-23 PROCEDURE — 80076 HEPATIC FUNCTION PANEL: CPT

## 2018-12-23 PROCEDURE — 85610 PROTHROMBIN TIME: CPT

## 2018-12-23 PROCEDURE — 96361 HYDRATE IV INFUSION ADD-ON: CPT

## 2018-12-23 PROCEDURE — 36415 COLL VENOUS BLD VENIPUNCTURE: CPT

## 2018-12-23 PROCEDURE — 6360000004 HC RX CONTRAST MEDICATION: Performed by: EMERGENCY MEDICINE

## 2018-12-23 PROCEDURE — 99285 EMERGENCY DEPT VISIT HI MDM: CPT

## 2018-12-23 PROCEDURE — 82272 OCCULT BLD FECES 1-3 TESTS: CPT

## 2018-12-23 PROCEDURE — 6370000000 HC RX 637 (ALT 250 FOR IP): Performed by: INTERNAL MEDICINE

## 2018-12-23 PROCEDURE — 80047 BASIC METABLC PNL IONIZED CA: CPT

## 2018-12-23 PROCEDURE — 86850 RBC ANTIBODY SCREEN: CPT

## 2018-12-23 PROCEDURE — 74177 CT ABD & PELVIS W/CONTRAST: CPT

## 2018-12-23 PROCEDURE — 83690 ASSAY OF LIPASE: CPT

## 2018-12-23 PROCEDURE — 81003 URINALYSIS AUTO W/O SCOPE: CPT

## 2018-12-23 PROCEDURE — 86901 BLOOD TYPING SEROLOGIC RH(D): CPT

## 2018-12-23 PROCEDURE — 86900 BLOOD TYPING SEROLOGIC ABO: CPT

## 2018-12-23 PROCEDURE — 2580000003 HC RX 258: Performed by: PHYSICIAN ASSISTANT

## 2018-12-23 PROCEDURE — 1200000000 HC SEMI PRIVATE

## 2018-12-23 PROCEDURE — 85025 COMPLETE CBC W/AUTO DIFF WBC: CPT

## 2018-12-23 PROCEDURE — 83605 ASSAY OF LACTIC ACID: CPT

## 2018-12-23 PROCEDURE — 6370000000 HC RX 637 (ALT 250 FOR IP): Performed by: FAMILY MEDICINE

## 2018-12-23 PROCEDURE — 96374 THER/PROPH/DIAG INJ IV PUSH: CPT

## 2018-12-23 PROCEDURE — 2580000003 HC RX 258: Performed by: FAMILY MEDICINE

## 2018-12-23 PROCEDURE — 6370000000 HC RX 637 (ALT 250 FOR IP): Performed by: PHYSICIAN ASSISTANT

## 2018-12-23 PROCEDURE — C9113 INJ PANTOPRAZOLE SODIUM, VIA: HCPCS | Performed by: PHYSICIAN ASSISTANT

## 2018-12-23 PROCEDURE — 83880 ASSAY OF NATRIURETIC PEPTIDE: CPT

## 2018-12-23 RX ORDER — LISINOPRIL 5 MG/1
5 TABLET ORAL DAILY
Status: DISCONTINUED | OUTPATIENT
Start: 2018-12-23 | End: 2018-12-26 | Stop reason: HOSPADM

## 2018-12-23 RX ORDER — PANTOPRAZOLE SODIUM 40 MG/1
40 TABLET, DELAYED RELEASE ORAL
Status: DISCONTINUED | OUTPATIENT
Start: 2018-12-24 | End: 2018-12-26 | Stop reason: HOSPADM

## 2018-12-23 RX ORDER — ACETAMINOPHEN 325 MG/1
650 TABLET ORAL EVERY 4 HOURS PRN
Status: DISCONTINUED | OUTPATIENT
Start: 2018-12-23 | End: 2018-12-26 | Stop reason: HOSPADM

## 2018-12-23 RX ORDER — OXYCODONE HYDROCHLORIDE AND ACETAMINOPHEN 5; 325 MG/1; MG/1
1 TABLET ORAL ONCE
Status: COMPLETED | OUTPATIENT
Start: 2018-12-23 | End: 2018-12-23

## 2018-12-23 RX ORDER — CARVEDILOL 12.5 MG/1
12.5 TABLET ORAL 2 TIMES DAILY WITH MEALS
Status: DISCONTINUED | OUTPATIENT
Start: 2018-12-23 | End: 2018-12-26 | Stop reason: HOSPADM

## 2018-12-23 RX ORDER — SODIUM CHLORIDE 9 MG/ML
INJECTION, SOLUTION INTRAVENOUS CONTINUOUS
Status: DISCONTINUED | OUTPATIENT
Start: 2018-12-23 | End: 2018-12-25

## 2018-12-23 RX ORDER — 0.9 % SODIUM CHLORIDE 0.9 %
10 VIAL (ML) INJECTION EVERY 12 HOURS SCHEDULED
Status: DISCONTINUED | OUTPATIENT
Start: 2018-12-23 | End: 2018-12-23 | Stop reason: SDUPTHER

## 2018-12-23 RX ORDER — 0.9 % SODIUM CHLORIDE 0.9 %
1000 INTRAVENOUS SOLUTION INTRAVENOUS ONCE
Status: COMPLETED | OUTPATIENT
Start: 2018-12-23 | End: 2018-12-23

## 2018-12-23 RX ORDER — OXYCODONE HYDROCHLORIDE AND ACETAMINOPHEN 5; 325 MG/1; MG/1
2 TABLET ORAL EVERY 4 HOURS PRN
Status: DISCONTINUED | OUTPATIENT
Start: 2018-12-23 | End: 2018-12-26 | Stop reason: HOSPADM

## 2018-12-23 RX ORDER — SODIUM CHLORIDE 0.9 % (FLUSH) 0.9 %
10 SYRINGE (ML) INJECTION EVERY 12 HOURS SCHEDULED
Status: DISCONTINUED | OUTPATIENT
Start: 2018-12-23 | End: 2018-12-26 | Stop reason: HOSPADM

## 2018-12-23 RX ORDER — OXYCODONE AND ACETAMINOPHEN 10; 325 MG/1; MG/1
1 TABLET ORAL EVERY 4 HOURS PRN
Status: ON HOLD | COMMUNITY
End: 2018-12-26

## 2018-12-23 RX ORDER — CARVEDILOL 12.5 MG/1
12.5 TABLET ORAL 2 TIMES DAILY WITH MEALS
COMMUNITY
End: 2019-06-11 | Stop reason: CLARIF

## 2018-12-23 RX ORDER — PANTOPRAZOLE SODIUM 40 MG/10ML
80 INJECTION, POWDER, LYOPHILIZED, FOR SOLUTION INTRAVENOUS ONCE
Status: COMPLETED | OUTPATIENT
Start: 2018-12-23 | End: 2018-12-23

## 2018-12-23 RX ORDER — ONDANSETRON 2 MG/ML
4 INJECTION INTRAMUSCULAR; INTRAVENOUS EVERY 6 HOURS PRN
Status: DISCONTINUED | OUTPATIENT
Start: 2018-12-23 | End: 2018-12-26 | Stop reason: HOSPADM

## 2018-12-23 RX ORDER — 0.9 % SODIUM CHLORIDE 0.9 %
10 VIAL (ML) INJECTION PRN
Status: DISCONTINUED | OUTPATIENT
Start: 2018-12-23 | End: 2018-12-23 | Stop reason: SDUPTHER

## 2018-12-23 RX ORDER — OXYCODONE HYDROCHLORIDE AND ACETAMINOPHEN 5; 325 MG/1; MG/1
1 TABLET ORAL EVERY 4 HOURS PRN
Status: DISCONTINUED | OUTPATIENT
Start: 2018-12-23 | End: 2018-12-26 | Stop reason: HOSPADM

## 2018-12-23 RX ORDER — AMLODIPINE BESYLATE 5 MG/1
5 TABLET ORAL DAILY
Status: DISCONTINUED | OUTPATIENT
Start: 2018-12-23 | End: 2018-12-23

## 2018-12-23 RX ORDER — ATORVASTATIN CALCIUM 20 MG/1
40 TABLET, FILM COATED ORAL NIGHTLY
Status: DISCONTINUED | OUTPATIENT
Start: 2018-12-23 | End: 2018-12-26 | Stop reason: HOSPADM

## 2018-12-23 RX ORDER — SODIUM CHLORIDE 0.9 % (FLUSH) 0.9 %
10 SYRINGE (ML) INJECTION PRN
Status: DISCONTINUED | OUTPATIENT
Start: 2018-12-23 | End: 2018-12-26 | Stop reason: HOSPADM

## 2018-12-23 RX ADMIN — OXYCODONE AND ACETAMINOPHEN 2 TABLET: 5; 325 TABLET ORAL at 12:37

## 2018-12-23 RX ADMIN — SODIUM CHLORIDE: 9 INJECTION, SOLUTION INTRAVENOUS at 12:36

## 2018-12-23 RX ADMIN — POLYETHYLENE GLYCOL-3350 AND ELECTROLYTES 2000 ML: 236; 6.74; 5.86; 2.97; 22.74 POWDER, FOR SOLUTION ORAL at 22:23

## 2018-12-23 RX ADMIN — SODIUM CHLORIDE 1000 ML: 9 INJECTION, SOLUTION INTRAVENOUS at 09:51

## 2018-12-23 RX ADMIN — OXYCODONE AND ACETAMINOPHEN 2 TABLET: 5; 325 TABLET ORAL at 16:58

## 2018-12-23 RX ADMIN — CARVEDILOL 12.5 MG: 12.5 TABLET, FILM COATED ORAL at 16:56

## 2018-12-23 RX ADMIN — Medication 10 ML: at 21:30

## 2018-12-23 RX ADMIN — LISINOPRIL 5 MG: 5 TABLET ORAL at 12:37

## 2018-12-23 RX ADMIN — ATORVASTATIN CALCIUM 40 MG: 20 TABLET, FILM COATED ORAL at 21:29

## 2018-12-23 RX ADMIN — IOPAMIDOL 80 ML: 755 INJECTION, SOLUTION INTRAVENOUS at 09:33

## 2018-12-23 RX ADMIN — OXYCODONE AND ACETAMINOPHEN 2 TABLET: 5; 325 TABLET ORAL at 21:29

## 2018-12-23 RX ADMIN — OXYCODONE AND ACETAMINOPHEN 1 TABLET: 5; 325 TABLET ORAL at 10:45

## 2018-12-23 RX ADMIN — PANTOPRAZOLE SODIUM 80 MG: 40 INJECTION, POWDER, FOR SOLUTION INTRAVENOUS at 10:07

## 2018-12-23 ASSESSMENT — PAIN DESCRIPTION - ORIENTATION
ORIENTATION: LOWER
ORIENTATION: RIGHT;LOWER

## 2018-12-23 ASSESSMENT — PAIN SCALES - GENERAL
PAINLEVEL_OUTOF10: 9
PAINLEVEL_OUTOF10: 3
PAINLEVEL_OUTOF10: 8
PAINLEVEL_OUTOF10: 7
PAINLEVEL_OUTOF10: 7
PAINLEVEL_OUTOF10: 8
PAINLEVEL_OUTOF10: 2
PAINLEVEL_OUTOF10: 8

## 2018-12-23 ASSESSMENT — PAIN DESCRIPTION - LOCATION
LOCATION: BACK
LOCATION: BACK
LOCATION: ABDOMEN

## 2018-12-23 ASSESSMENT — PAIN DESCRIPTION - PAIN TYPE
TYPE: ACUTE PAIN
TYPE: CHRONIC PAIN
TYPE: ACUTE PAIN

## 2018-12-23 ASSESSMENT — PAIN DESCRIPTION - ONSET
ONSET: ON-GOING
ONSET: ON-GOING

## 2018-12-23 ASSESSMENT — PAIN DESCRIPTION - FREQUENCY
FREQUENCY: CONTINUOUS

## 2018-12-23 ASSESSMENT — PAIN DESCRIPTION - PROGRESSION
CLINICAL_PROGRESSION: GRADUALLY WORSENING
CLINICAL_PROGRESSION: GRADUALLY WORSENING

## 2018-12-23 ASSESSMENT — PAIN DESCRIPTION - DESCRIPTORS
DESCRIPTORS: PRESSURE
DESCRIPTORS: PRESSURE
DESCRIPTORS: SQUEEZING;TIGHTNESS

## 2018-12-23 NOTE — ED PROVIDER NOTES
(36.7 °C), Pulse: 66, Resp: 20, SpO2: 100 %  Physical Exam   Constitutional: He is oriented to person, place, and time. He appears well-developed. No distress. Overweight male   HENT:   Head: Normocephalic and atraumatic. Neck: Normal range of motion. Neck supple. Cardiovascular: Normal rate, regular rhythm and normal heart sounds. Exam reveals no gallop and no friction rub. No murmur heard. Pulmonary/Chest: Effort normal and breath sounds normal. No respiratory distress. Abdominal: Soft. He exhibits no distension. There is tenderness (RLQ tenderness ). There is no rebound and no guarding. Genitourinary: Penis normal. Rectal exam shows guaiac positive stool. Genitourinary Comments: Digital rectal exam reveals reno bright red blood, good rectal tone, no significant stool burden. Musculoskeletal: Normal range of motion. Neurological: He is alert and oriented to person, place, and time. Skin: Skin is warm and dry. He is not diaphoretic. Psychiatric: He has a normal mood and affect. His behavior is normal.       Diagnostic Results     RADIOLOGY:  CT ABDOMEN PELVIS W IV CONTRAST Additional Contrast? None   Final Result   Impression:   1. Bilateral hilar lymphadenopathy, perihilar bronchovascular soft tissue attenuation lower lobes, small pulmonary nodules, and mild subpleural fibrosis in lung bases. Constellation of findings likely related to patient's known sarcoidosis. 2. Diffuse hepatic steatosis. 3. Probable scarring upper pole right kidney stable. 4. Colonic diverticulosis. 5. Small umbilical hernia containing small bowel without obstruction. 6. Stable small cystic lesion pancreatic head. Continued follow-up recommended. 7. Normal appendix.           LABS:   Results for orders placed or performed during the hospital encounter of 12/23/18   CBC Auto Differential   Result Value Ref Range    WBC 10.0 4.0 - 11.0 K/uL    RBC 4.74 4.20 - 5.90 M/uL    Hemoglobin 12.2 (L) 13.5 - 17.5 Bilirubin Urine Negative Negative mg/dL    Ketones, Urine Negative Negative mg/dL    Specific Gravity, UA 1.010 1.005 - 1.030    Blood, Urine Negative Negative    pH, UA 6.0 5.0 - 8.0    Protein, UA Negative Negative mg/dL    Urobilinogen, Urine 0.2 <2.0 E.U./dL    Nitrite, Urine Negative Negative    Leukocyte Esterase, Urine Negative Negative    Microscopic Examination SEE BELOW    TYPE AND SCREEN   Result Value Ref Range    ABO/Rh O POS     Antibody Screen NEG          RECENT VITALS:  BP: (!) 152/113, Temp: 98 °F (36.7 °C), Pulse: 64, Resp: 20, SpO2: 99 %     Procedures     None    ED Course     Nursing Notes, Past Medical Hx,Past Surgical Hx, Social Hx, Allergies, and Family Hx were reviewed. The patient was given the following medications:  Orders Placed This Encounter   Medications    iopamidol (ISOVUE-370) 76 % injection 80 mL    0.9 % sodium chloride bolus    pantoprazole (PROTONIX) injection 80 mg    oxyCODONE-acetaminophen (PERCOCET) 5-325 MG per tablet 1 tablet       CONSULTS:  IP CONSULT TO HOSPITALIST  IP CONSULT TO GI    MEDICAL DECISION MAKING / ASSESSMENT / Lexie Magdaleno is a 59 y.o. male with past medical history of hypertension and spinal cord injury who presents to the emergency department complaining of bright red blood per rectum that began last night and is now associated with right lower quadrant abdominal pain and nausea. On exam, this is a well-appearing male in no acute distress. Heart and lung sounds are normal.  Abdomen is soft with tenderness to palpation in the right lower quadrant with no rebound or guarding. Digital rectal exam is significant for reno bright red blood, good rectal tone, no significant stool burden. Laboratory workup was largely unremarkable with a hemoglobin of 12.2, previous hemoglobin in 05/2018 was 13.2. Given this, no transfusion was initiated. The patient was started on IV fluids in the emergency department. Fecal occult blood was positive.

## 2018-12-23 NOTE — ED TRIAGE NOTES
Pt comes to ed with complaint of rt upper quadrant pain and rectal bleeding that started last night around 9 pm. Pt report bright red stools. Pt has a spinal cord injury and is unable walk well has to use a wheelchair and walker.

## 2018-12-23 NOTE — H&P
4.74 12/23/2018    HGB 12.2 12/23/2018    HCT 39.0 12/23/2018    MCV 82.4 12/23/2018    MCH 25.8 12/23/2018    MCHC 31.3 12/23/2018    RDW 14.7 12/23/2018     12/23/2018    MPV 7.6 12/23/2018     BMP:    Lab Results   Component Value Date     05/04/2018    K 4.2 05/04/2018     05/04/2018    CO2 26 12/23/2018    BUN 15 05/04/2018    CREATININE 1.3 12/23/2018    CREATININE 1.0 05/04/2018    CALCIUM 9.2 05/04/2018    GFRAA >60 12/23/2018    GFRAA 75 02/20/2013    LABGLOM 56 12/23/2018    GLUCOSE 91 05/04/2018     Lipase 26  UA negative    Heme +      Imaging:   CT abdomen  Impression:   1. Bilateral hilar lymphadenopathy, perihilar bronchovascular soft tissue attenuation lower lobes, small pulmonary nodules, and mild subpleural fibrosis in lung bases. Constellation of findings likely related to patient's known sarcoidosis. 2. Diffuse hepatic steatosis. 3. Probable scarring upper pole right kidney stable. 4. Colonic diverticulosis. 5. Small umbilical hernia containing small bowel without obstruction. 6. Stable small cystic lesion pancreatic head. Continued follow-up recommended. 7. Normal appendix. Assessment/Plan:   Principal Problem:    Rectal bleeding likely diverticular bleed  Active Problems:    Diverticulosis    H/O Sarcoidosis    HTN (hypertension)  Chronic stable pancreatic cyst      Will keep on clear liquids. Monitor Hgb  Consult GI to consider scope. SCDs, no anticoagulation due to bleeding. Continue coreg. Give Protonix. Admit as inpatient. I anticipate hospitalization spanning more than two midnights for investigation and treatment of the above medically necessary diagnoses.       Jade Arguello MD    12/23/2018 12:13 PM

## 2018-12-24 ENCOUNTER — ANESTHESIA EVENT (OUTPATIENT)
Dept: ENDOSCOPY | Age: 63
DRG: 378 | End: 2018-12-24
Payer: OTHER GOVERNMENT

## 2018-12-24 ENCOUNTER — ANESTHESIA (OUTPATIENT)
Dept: ENDOSCOPY | Age: 63
DRG: 378 | End: 2018-12-24
Payer: OTHER GOVERNMENT

## 2018-12-24 VITALS
OXYGEN SATURATION: 98 % | SYSTOLIC BLOOD PRESSURE: 105 MMHG | RESPIRATION RATE: 20 BRPM | DIASTOLIC BLOOD PRESSURE: 65 MMHG

## 2018-12-24 LAB
HEMOGLOBIN: 10.1 G/DL (ref 13.5–17.5)
HEMOGLOBIN: 10.6 G/DL (ref 13.5–17.5)
HEMOGLOBIN: 10.6 G/DL (ref 13.5–17.5)
HEMOGLOBIN: 11.4 G/DL (ref 13.5–17.5)

## 2018-12-24 PROCEDURE — 3700000000 HC ANESTHESIA ATTENDED CARE: Performed by: INTERNAL MEDICINE

## 2018-12-24 PROCEDURE — 6370000000 HC RX 637 (ALT 250 FOR IP): Performed by: FAMILY MEDICINE

## 2018-12-24 PROCEDURE — 0DBL8ZX EXCISION OF TRANSVERSE COLON, VIA NATURAL OR ARTIFICIAL OPENING ENDOSCOPIC, DIAGNOSTIC: ICD-10-PCS | Performed by: INTERNAL MEDICINE

## 2018-12-24 PROCEDURE — 1200000000 HC SEMI PRIVATE

## 2018-12-24 PROCEDURE — 2709999900 HC NON-CHARGEABLE SUPPLY: Performed by: INTERNAL MEDICINE

## 2018-12-24 PROCEDURE — 7100000010 HC PHASE II RECOVERY - FIRST 15 MIN: Performed by: INTERNAL MEDICINE

## 2018-12-24 PROCEDURE — 36415 COLL VENOUS BLD VENIPUNCTURE: CPT

## 2018-12-24 PROCEDURE — 88305 TISSUE EXAM BY PATHOLOGIST: CPT

## 2018-12-24 PROCEDURE — 3609019800 HC COLONOSCOPY WITH SUBMUCOSAL INJECTION: Performed by: INTERNAL MEDICINE

## 2018-12-24 PROCEDURE — 85018 HEMOGLOBIN: CPT

## 2018-12-24 PROCEDURE — 6370000000 HC RX 637 (ALT 250 FOR IP): Performed by: INTERNAL MEDICINE

## 2018-12-24 PROCEDURE — 7100000011 HC PHASE II RECOVERY - ADDTL 15 MIN: Performed by: INTERNAL MEDICINE

## 2018-12-24 PROCEDURE — 6360000002 HC RX W HCPCS: Performed by: NURSE ANESTHETIST, CERTIFIED REGISTERED

## 2018-12-24 PROCEDURE — 2580000003 HC RX 258: Performed by: FAMILY MEDICINE

## 2018-12-24 PROCEDURE — 2500000003 HC RX 250 WO HCPCS: Performed by: NURSE ANESTHETIST, CERTIFIED REGISTERED

## 2018-12-24 PROCEDURE — 3700000001 HC ADD 15 MINUTES (ANESTHESIA): Performed by: INTERNAL MEDICINE

## 2018-12-24 PROCEDURE — 3609010600 HC COLONOSCOPY POLYPECTOMY SNARE/COLD BIOPSY: Performed by: INTERNAL MEDICINE

## 2018-12-24 RX ORDER — METOPROLOL TARTRATE 5 MG/5ML
INJECTION INTRAVENOUS PRN
Status: DISCONTINUED | OUTPATIENT
Start: 2018-12-24 | End: 2018-12-24 | Stop reason: SDUPTHER

## 2018-12-24 RX ORDER — LIDOCAINE HYDROCHLORIDE 20 MG/ML
INJECTION, SOLUTION INFILTRATION; PERINEURAL PRN
Status: DISCONTINUED | OUTPATIENT
Start: 2018-12-24 | End: 2018-12-24 | Stop reason: SDUPTHER

## 2018-12-24 RX ORDER — GLYCOPYRROLATE 0.2 MG/ML
INJECTION INTRAMUSCULAR; INTRAVENOUS PRN
Status: DISCONTINUED | OUTPATIENT
Start: 2018-12-24 | End: 2018-12-24 | Stop reason: SDUPTHER

## 2018-12-24 RX ORDER — PROPOFOL 10 MG/ML
INJECTION, EMULSION INTRAVENOUS PRN
Status: DISCONTINUED | OUTPATIENT
Start: 2018-12-24 | End: 2018-12-24 | Stop reason: SDUPTHER

## 2018-12-24 RX ADMIN — CARVEDILOL 12.5 MG: 12.5 TABLET, FILM COATED ORAL at 16:48

## 2018-12-24 RX ADMIN — LIDOCAINE HYDROCHLORIDE 200 MG: 20 INJECTION, SOLUTION INFILTRATION; PERINEURAL at 12:30

## 2018-12-24 RX ADMIN — OXYCODONE AND ACETAMINOPHEN 2 TABLET: 5; 325 TABLET ORAL at 05:54

## 2018-12-24 RX ADMIN — OXYCODONE AND ACETAMINOPHEN 2 TABLET: 5; 325 TABLET ORAL at 22:08

## 2018-12-24 RX ADMIN — OXYCODONE AND ACETAMINOPHEN 2 TABLET: 5; 325 TABLET ORAL at 01:47

## 2018-12-24 RX ADMIN — GLYCOPYRROLATE 0.2 MG: 0.2 INJECTION, SOLUTION INTRAMUSCULAR; INTRAVENOUS at 12:39

## 2018-12-24 RX ADMIN — SODIUM CHLORIDE: 9 INJECTION, SOLUTION INTRAVENOUS at 09:17

## 2018-12-24 RX ADMIN — PROPOFOL 50 MG: 10 INJECTION, EMULSION INTRAVENOUS at 12:54

## 2018-12-24 RX ADMIN — PROPOFOL 50 MG: 10 INJECTION, EMULSION INTRAVENOUS at 12:45

## 2018-12-24 RX ADMIN — PROPOFOL 50 MG: 10 INJECTION, EMULSION INTRAVENOUS at 12:50

## 2018-12-24 RX ADMIN — Medication 10 ML: at 08:29

## 2018-12-24 RX ADMIN — OXYCODONE AND ACETAMINOPHEN 2 TABLET: 5; 325 TABLET ORAL at 18:08

## 2018-12-24 RX ADMIN — PROPOFOL 30 MG: 10 INJECTION, EMULSION INTRAVENOUS at 12:41

## 2018-12-24 RX ADMIN — METOPROLOL TARTRATE 2 MG: 5 INJECTION, SOLUTION INTRAVENOUS at 12:37

## 2018-12-24 RX ADMIN — SODIUM CHLORIDE: 9 INJECTION, SOLUTION INTRAVENOUS at 15:02

## 2018-12-24 RX ADMIN — PROPOFOL 30 MG: 10 INJECTION, EMULSION INTRAVENOUS at 12:30

## 2018-12-24 RX ADMIN — POLYETHYLENE GLYCOL-3350 AND ELECTROLYTES 2000 ML: 236; 6.74; 5.86; 2.97; 22.74 POWDER, FOR SOLUTION ORAL at 05:30

## 2018-12-24 RX ADMIN — LISINOPRIL 5 MG: 5 TABLET ORAL at 14:04

## 2018-12-24 RX ADMIN — PROPOFOL 30 MG: 10 INJECTION, EMULSION INTRAVENOUS at 12:34

## 2018-12-24 RX ADMIN — ATORVASTATIN CALCIUM 40 MG: 20 TABLET, FILM COATED ORAL at 20:19

## 2018-12-24 RX ADMIN — OXYCODONE AND ACETAMINOPHEN 2 TABLET: 5; 325 TABLET ORAL at 14:03

## 2018-12-24 RX ADMIN — CARVEDILOL 12.5 MG: 12.5 TABLET, FILM COATED ORAL at 08:28

## 2018-12-24 ASSESSMENT — PULMONARY FUNCTION TESTS
PIF_VALUE: 0
PIF_VALUE: 1
PIF_VALUE: 0

## 2018-12-24 ASSESSMENT — PAIN DESCRIPTION - DESCRIPTORS
DESCRIPTORS: THROBBING
DESCRIPTORS: ACHING;CRAMPING
DESCRIPTORS: ACHING

## 2018-12-24 ASSESSMENT — PAIN SCALES - GENERAL
PAINLEVEL_OUTOF10: 8
PAINLEVEL_OUTOF10: 7
PAINLEVEL_OUTOF10: 7
PAINLEVEL_OUTOF10: 0
PAINLEVEL_OUTOF10: 8
PAINLEVEL_OUTOF10: 6
PAINLEVEL_OUTOF10: 7

## 2018-12-24 ASSESSMENT — PAIN DESCRIPTION - LOCATION
LOCATION: ABDOMEN

## 2018-12-24 ASSESSMENT — PAIN DESCRIPTION - PAIN TYPE
TYPE: ACUTE PAIN
TYPE: CHRONIC PAIN
TYPE: ACUTE PAIN

## 2018-12-24 ASSESSMENT — PAIN DESCRIPTION - FREQUENCY: FREQUENCY: CONTINUOUS

## 2018-12-24 ASSESSMENT — PAIN DESCRIPTION - PROGRESSION: CLINICAL_PROGRESSION: NOT CHANGED

## 2018-12-24 ASSESSMENT — PAIN DESCRIPTION - ORIENTATION
ORIENTATION: RIGHT
ORIENTATION: RIGHT

## 2018-12-24 ASSESSMENT — PAIN DESCRIPTION - ONSET: ONSET: ON-GOING

## 2018-12-24 NOTE — PROGRESS NOTES
Maximilian Gaviria is a 59 y.o. male patient. Current Facility-Administered Medications   Medication Dose Route Frequency Provider Last Rate Last Dose    atorvastatin (LIPITOR) tablet 40 mg  40 mg Oral Nightly Lsiset Haq MD   40 mg at 12/23/18 2129    lisinopril (PRINIVIL;ZESTRIL) tablet 5 mg  5 mg Oral Daily Lisset Haq MD   Stopped at 12/24/18 0828    pantoprazole (PROTONIX) tablet 40 mg  40 mg Oral QAM AC Lisset Haq MD   Stopped at 12/24/18 0828    ondansetron (ZOFRAN) injection 4 mg  4 mg Intravenous Q6H PRN Lisset Haq MD        oxyCODONE-acetaminophen (PERCOCET) 5-325 MG per tablet 1 tablet  1 tablet Oral Q4H PRN Lisset Haq MD        Or    oxyCODONE-acetaminophen (PERCOCET) 5-325 MG per tablet 2 tablet  2 tablet Oral Q4H PRN Lisset Haq MD   2 tablet at 12/24/18 0554    0.9 % sodium chloride infusion   Intravenous Continuous Lisset Haq  mL/hr at 12/24/18 2892      sodium chloride flush 0.9 % injection 10 mL  10 mL Intravenous 2 times per day Lisset Haq MD   10 mL at 12/24/18 4900    sodium chloride flush 0.9 % injection 10 mL  10 mL Intravenous PRN Lisset Haq MD        acetaminophen (TYLENOL) tablet 650 mg  650 mg Oral Q4H PRN Lisset Haq MD        carvedilol (COREG) tablet 12.5 mg  12.5 mg Oral BID WC Lisset Haq MD   Stopped at 12/24/18 2083     Allergies   Allergen Reactions    Baclofen      Messes with my urine    Robaxin [Methocarbamol]      Irritates my stomach     Principal Problem:    Rectal bleeding  Active Problems:    Sarcoidosis    Diverticulosis    HTN (hypertension)    Bright red rectal bleeding  Resolved Problems:    * No resolved hospital problems. *    Blood pressure (!) 151/91, pulse 74, temperature 97.6 °F (36.4 °C), temperature source Oral, resp. rate 16, height 5' 11\" (1.803 m), weight 240 lb (108.9 kg), SpO2 94 %. Subjective:  Pain:  He complains of pain that is severe. He reports pain is unchanged.   Pain is requiring pain medication. (Abdominal pain 8/10 continuous. ). Objective:  General Appearance: In no acute distress. Vital signs: (most recent): Blood pressure (!) 151/91, pulse 74, temperature 97.6 °F (36.4 °C), temperature source Oral, resp. rate 16, height 5' 11\" (1.803 m), weight 240 lb (108.9 kg), SpO2 94 %. Vital signs are normal.    Output: Producing urine and producing stool. HEENT: Normal HEENT exam.    Abdomen: Abdomen is distended. There is generalized tenderness. There is right lower quadrant tenderness. Neurological: Patient is alert and oriented to person, place and time. Skin:  Warm and dry.       Assessment & Plan    Bailey Naylor RN  12/24/2018

## 2018-12-24 NOTE — CONSULTS
[facility]  Consultation Note        Subjective:       Patient is a 59 y.o.  male admitted 12/23/2018 with Bright red rectal bleeding [K62.5]  Bright red rectal bleeding [K62.5] who is seen in consult for the same. He reports having several episodes of bright red blood per rectum overnight. Hgb 12.2 - 11.8 - 10.9. Filled toilet bowel, no clots. No prior bleeding. He reports a normal colonoscopy at [facility] several years ago however CT scan on admission with diverticulosis.  +dizziness, LH. Denies fever, chills, chest pain, dyspnea, dysphagia, odynophagia, nausea, vomiting, melena, hematemesis, weight loss, jaundice. Past Endoscopic History  EGD 2/2013  - gastric ulcer    Past Medical History:   Diagnosis Date    DJD (degenerative joint disease)     HYPERCHOLESTERAEMIA     Hypertension     Sarcoidosis     Spinal cord injuries      FALL      Past Surgical History:   Procedure Laterality Date    BACK SURGERY      GALLBLADDER SURGERY      HERNIA REPAIR      inguinal    OTHER SURGICAL HISTORY  05/01/2018    [facility]          Prescriptions Prior to Admission: carvedilol (COREG) 12.5 MG tablet, Take 12.5 mg by mouth 2 times daily (with meals)  oxyCODONE-acetaminophen (PERCOCET)  MG per tablet, Take 1 tablet by mouth every 4 hours as needed for Pain. Melani Brian atorvastatin (LIPITOR) 40 MG tablet, Take 1 tablet by mouth nightly  amLODIPine (NORVASC) 5 MG tablet, Take 5 mg by mouth daily UNSURE OF DOSE  lisinopril (PRINIVIL;ZESTRIL) 5 MG tablet, Take 5 mg by mouth daily. Patient denies NSAID use.       Allergies   Allergen Reactions    Baclofen      Messes with my urine    Robaxin [Methocarbamol]      Irritates my stomach        Social History   Substance Use Topics    Smoking status: Former Smoker     Quit date: 2015    Smokeless tobacco: Never Used      Comment: 3 cig    Alcohol use No        No family history of colon cancer, Crohn's disease,

## 2018-12-24 NOTE — ANESTHESIA PRE PROCEDURE
liquid consumption: 0800                        Time of last solid consumption: 2100                        Date of last liquid consumption: 12/24/18                        Date of last solid food consumption: 12/22/18    BMI:   Wt Readings from Last 3 Encounters:   12/23/18 240 lb (108.9 kg)   05/04/18 233 lb 14.5 oz (106.1 kg)   06/21/16 230 lb (104.3 kg)     Body mass index is 33.47 kg/m².     CBC:   Lab Results   Component Value Date    WBC 10.0 12/23/2018    RBC 4.74 12/23/2018    HGB 10.6 12/24/2018    HCT 39.0 12/23/2018    MCV 82.4 12/23/2018    RDW 14.7 12/23/2018     12/23/2018       CMP:   Lab Results   Component Value Date     05/04/2018    K 4.2 05/04/2018     05/04/2018    CO2 26 12/23/2018    BUN 15 05/04/2018    CREATININE 1.3 12/23/2018    CREATININE 1.0 05/04/2018    GFRAA >60 12/23/2018    GFRAA 75 02/20/2013    LABGLOM 56 12/23/2018    GLUCOSE 91 05/04/2018    PROT 6.7 12/23/2018    PROT 6.4 02/20/2013    CALCIUM 9.2 05/04/2018    BILITOT 0.3 12/23/2018    ALKPHOS 112 12/23/2018    AST 18 12/23/2018    ALT 21 12/23/2018       POC Tests:   Recent Labs      12/23/18   0933   POCGLU  104*   POCNA  140   POCK  4.3   POCCL  104   POCBUN  18       Coags:   Lab Results   Component Value Date    PROTIME 12.4 12/23/2018    INR 1.09 12/23/2018    APTT 30.2 04/28/2018       HCG (If Applicable): No results found for: PREGTESTUR, PREGSERUM, HCG, HCGQUANT     ABGs: No results found for: PHART, PO2ART, WPA3XOJ, KQY2CCH, BEART, Z9XDSKAI     Type & Screen (If Applicable):  No results found for: LABABO, [address]    Anesthesia Evaluation  Patient summary reviewed no history of anesthetic complications:   Airway:         Dental:          Pulmonary:   (+) pneumonia:                             Cardiovascular:    (+) hypertension:,                   Neuro/Psych:               GI/Hepatic/Renal:             Endo/Other:    (+) : arthritis:., .                 Abdominal:           Vascular:

## 2018-12-24 NOTE — PROGRESS NOTES
Discussed with nursing tolerating PO so far no Gi bleeding. Will sign off recall with questions.  Will follow up in office in 2 weeks to set up colonoscopy for polypectomy and schedule follow up of pancreatic cyst noted on last 2 CT's

## 2018-12-25 LAB
HEMOGLOBIN: 10 G/DL (ref 13.5–17.5)
HEMOGLOBIN: 9.5 G/DL (ref 13.5–17.5)
HEMOGLOBIN: 9.7 G/DL (ref 13.5–17.5)
IRON SATURATION: 10 % (ref 20–50)
IRON: 28 UG/DL (ref 59–158)
TOTAL IRON BINDING CAPACITY: 281 UG/DL (ref 260–445)

## 2018-12-25 PROCEDURE — 85018 HEMOGLOBIN: CPT

## 2018-12-25 PROCEDURE — 6370000000 HC RX 637 (ALT 250 FOR IP): Performed by: FAMILY MEDICINE

## 2018-12-25 PROCEDURE — 83540 ASSAY OF IRON: CPT

## 2018-12-25 PROCEDURE — 83550 IRON BINDING TEST: CPT

## 2018-12-25 PROCEDURE — 2580000003 HC RX 258: Performed by: FAMILY MEDICINE

## 2018-12-25 PROCEDURE — 6360000002 HC RX W HCPCS: Performed by: FAMILY MEDICINE

## 2018-12-25 PROCEDURE — 36415 COLL VENOUS BLD VENIPUNCTURE: CPT

## 2018-12-25 PROCEDURE — 1200000000 HC SEMI PRIVATE

## 2018-12-25 PROCEDURE — 6360000002 HC RX W HCPCS: Performed by: INTERNAL MEDICINE

## 2018-12-25 RX ORDER — MORPHINE SULFATE 2 MG/ML
4 INJECTION, SOLUTION INTRAMUSCULAR; INTRAVENOUS ONCE
Status: COMPLETED | OUTPATIENT
Start: 2018-12-25 | End: 2018-12-25

## 2018-12-25 RX ADMIN — MORPHINE SULFATE 4 MG: 2 INJECTION, SOLUTION INTRAMUSCULAR; INTRAVENOUS at 01:24

## 2018-12-25 RX ADMIN — ONDANSETRON 4 MG: 2 INJECTION INTRAMUSCULAR; INTRAVENOUS at 12:18

## 2018-12-25 RX ADMIN — CARVEDILOL 12.5 MG: 12.5 TABLET, FILM COATED ORAL at 17:29

## 2018-12-25 RX ADMIN — OXYCODONE AND ACETAMINOPHEN 2 TABLET: 5; 325 TABLET ORAL at 02:32

## 2018-12-25 RX ADMIN — CARVEDILOL 12.5 MG: 12.5 TABLET, FILM COATED ORAL at 09:17

## 2018-12-25 RX ADMIN — OXYCODONE AND ACETAMINOPHEN 2 TABLET: 5; 325 TABLET ORAL at 10:43

## 2018-12-25 RX ADMIN — OXYCODONE AND ACETAMINOPHEN 2 TABLET: 5; 325 TABLET ORAL at 06:33

## 2018-12-25 RX ADMIN — PANTOPRAZOLE SODIUM 40 MG: 40 TABLET, DELAYED RELEASE ORAL at 06:32

## 2018-12-25 RX ADMIN — OXYCODONE AND ACETAMINOPHEN 2 TABLET: 5; 325 TABLET ORAL at 23:23

## 2018-12-25 RX ADMIN — ONDANSETRON 4 MG: 2 INJECTION INTRAMUSCULAR; INTRAVENOUS at 17:29

## 2018-12-25 RX ADMIN — OXYCODONE AND ACETAMINOPHEN 2 TABLET: 5; 325 TABLET ORAL at 14:38

## 2018-12-25 RX ADMIN — OXYCODONE AND ACETAMINOPHEN 2 TABLET: 5; 325 TABLET ORAL at 18:41

## 2018-12-25 RX ADMIN — Medication 10 ML: at 10:46

## 2018-12-25 RX ADMIN — Medication 10 ML: at 20:46

## 2018-12-25 RX ADMIN — ATORVASTATIN CALCIUM 40 MG: 20 TABLET, FILM COATED ORAL at 20:46

## 2018-12-25 RX ADMIN — LISINOPRIL 5 MG: 5 TABLET ORAL at 09:17

## 2018-12-25 ASSESSMENT — PAIN DESCRIPTION - ONSET
ONSET: ON-GOING
ONSET: ON-GOING

## 2018-12-25 ASSESSMENT — PAIN SCALES - GENERAL
PAINLEVEL_OUTOF10: 8
PAINLEVEL_OUTOF10: 6
PAINLEVEL_OUTOF10: 8
PAINLEVEL_OUTOF10: 0
PAINLEVEL_OUTOF10: 7
PAINLEVEL_OUTOF10: 8
PAINLEVEL_OUTOF10: 8
PAINLEVEL_OUTOF10: 7

## 2018-12-25 ASSESSMENT — PAIN DESCRIPTION - ORIENTATION
ORIENTATION: RIGHT

## 2018-12-25 ASSESSMENT — PAIN DESCRIPTION - PROGRESSION
CLINICAL_PROGRESSION: NOT CHANGED
CLINICAL_PROGRESSION: NOT CHANGED

## 2018-12-25 ASSESSMENT — PAIN DESCRIPTION - FREQUENCY
FREQUENCY: CONTINUOUS

## 2018-12-25 ASSESSMENT — PAIN DESCRIPTION - PAIN TYPE
TYPE: ACUTE PAIN

## 2018-12-25 ASSESSMENT — PAIN DESCRIPTION - DESCRIPTORS
DESCRIPTORS: ACHING;CONSTANT;THROBBING
DESCRIPTORS: THROBBING
DESCRIPTORS: THROBBING

## 2018-12-25 ASSESSMENT — PAIN DESCRIPTION - LOCATION
LOCATION: ABDOMEN
LOCATION: BACK;SHOULDER
LOCATION: ABDOMEN

## 2018-12-26 VITALS
WEIGHT: 240 LBS | OXYGEN SATURATION: 95 % | HEART RATE: 74 BPM | TEMPERATURE: 97.7 F | RESPIRATION RATE: 16 BRPM | HEIGHT: 71 IN | SYSTOLIC BLOOD PRESSURE: 147 MMHG | DIASTOLIC BLOOD PRESSURE: 89 MMHG | BODY MASS INDEX: 33.6 KG/M2

## 2018-12-26 LAB — HEMOGLOBIN: 10.7 G/DL (ref 13.5–17.5)

## 2018-12-26 PROCEDURE — 2580000003 HC RX 258: Performed by: FAMILY MEDICINE

## 2018-12-26 PROCEDURE — 85018 HEMOGLOBIN: CPT

## 2018-12-26 PROCEDURE — 6370000000 HC RX 637 (ALT 250 FOR IP): Performed by: FAMILY MEDICINE

## 2018-12-26 PROCEDURE — 6360000002 HC RX W HCPCS: Performed by: INTERNAL MEDICINE

## 2018-12-26 PROCEDURE — 36415 COLL VENOUS BLD VENIPUNCTURE: CPT

## 2018-12-26 PROCEDURE — 2580000003 HC RX 258: Performed by: INTERNAL MEDICINE

## 2018-12-26 RX ORDER — OXYCODONE AND ACETAMINOPHEN 10; 325 MG/1; MG/1
1 TABLET ORAL EVERY 6 HOURS PRN
Qty: 12 TABLET | Refills: 0 | Status: SHIPPED | OUTPATIENT
Start: 2018-12-26 | End: 2018-12-29

## 2018-12-26 RX ORDER — PANTOPRAZOLE SODIUM 40 MG/1
40 TABLET, DELAYED RELEASE ORAL
Qty: 30 TABLET | Refills: 3 | Status: SHIPPED | OUTPATIENT
Start: 2018-12-27 | End: 2019-06-11 | Stop reason: CLARIF

## 2018-12-26 RX ADMIN — IRON SUCROSE 300 MG: 20 INJECTION, SOLUTION INTRAVENOUS at 08:00

## 2018-12-26 RX ADMIN — OXYCODONE AND ACETAMINOPHEN 2 TABLET: 5; 325 TABLET ORAL at 03:17

## 2018-12-26 RX ADMIN — CARVEDILOL 12.5 MG: 12.5 TABLET, FILM COATED ORAL at 08:00

## 2018-12-26 RX ADMIN — LISINOPRIL 5 MG: 5 TABLET ORAL at 08:00

## 2018-12-26 RX ADMIN — PANTOPRAZOLE SODIUM 40 MG: 40 TABLET, DELAYED RELEASE ORAL at 06:52

## 2018-12-26 RX ADMIN — Medication 10 ML: at 08:00

## 2018-12-26 RX ADMIN — OXYCODONE AND ACETAMINOPHEN 2 TABLET: 5; 325 TABLET ORAL at 08:00

## 2018-12-26 ASSESSMENT — PAIN DESCRIPTION - PROGRESSION
CLINICAL_PROGRESSION: NOT CHANGED
CLINICAL_PROGRESSION: NOT CHANGED

## 2018-12-26 ASSESSMENT — PAIN DESCRIPTION - FREQUENCY: FREQUENCY: CONTINUOUS

## 2018-12-26 ASSESSMENT — PAIN SCALES - GENERAL
PAINLEVEL_OUTOF10: 8
PAINLEVEL_OUTOF10: 0
PAINLEVEL_OUTOF10: 8

## 2018-12-26 ASSESSMENT — PAIN DESCRIPTION - PAIN TYPE: TYPE: ACUTE PAIN;SURGICAL PAIN

## 2018-12-26 ASSESSMENT — PAIN DESCRIPTION - DESCRIPTORS: DESCRIPTORS: ACHING;CONSTANT

## 2018-12-26 ASSESSMENT — PAIN DESCRIPTION - ORIENTATION: ORIENTATION: RIGHT

## 2018-12-26 ASSESSMENT — PAIN DESCRIPTION - LOCATION: LOCATION: BACK

## 2018-12-26 ASSESSMENT — PAIN DESCRIPTION - ONSET: ONSET: ON-GOING

## 2018-12-26 NOTE — PROGRESS NOTES
Patient alert and oriented x4. Vitals stable. Pain managed with oral medication. No significant changes overnight. Hemoglobin trending down. Will continue to monitor.

## 2019-03-21 ENCOUNTER — APPOINTMENT (OUTPATIENT)
Dept: CT IMAGING | Age: 64
End: 2019-03-21
Payer: OTHER GOVERNMENT

## 2019-03-21 ENCOUNTER — HOSPITAL ENCOUNTER (EMERGENCY)
Age: 64
Discharge: HOME | End: 2019-03-22
Attending: EMERGENCY MEDICINE
Payer: OTHER GOVERNMENT

## 2019-03-21 DIAGNOSIS — M54.50 ACUTE MIDLINE LOW BACK PAIN WITHOUT SCIATICA: Primary | ICD-10-CM
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
## 2019-03-21 LAB
GLUCOSE BLD-MCNC: 109 MG/DL (ref 70–99)
PERFORMED ON: ABNORMAL

## 2019-03-21 PROCEDURE — 99284 EMERGENCY DEPT VISIT MOD MDM: CPT

## 2019-03-21 PROCEDURE — 70496 CT ANGIOGRAPHY HEAD: CPT

## 2019-03-21 PROCEDURE — 6360000004 HC RX CONTRAST MEDICATION: Performed by: EMERGENCY MEDICINE

## 2019-03-21 PROCEDURE — 70450 CT HEAD/BRAIN W/O DYE: CPT

## 2019-03-21 RX ADMIN — IOPAMIDOL 80 ML: 755 INJECTION, SOLUTION INTRAVENOUS at 23:54

## 2019-03-22 ENCOUNTER — APPOINTMENT (OUTPATIENT)
Dept: GENERAL RADIOLOGY | Age: 64
End: 2019-03-22
Payer: OTHER GOVERNMENT

## 2019-03-22 ENCOUNTER — APPOINTMENT (OUTPATIENT)
Dept: CT IMAGING | Age: 64
End: 2019-03-22
Payer: OTHER GOVERNMENT

## 2019-03-22 ENCOUNTER — HOSPITAL ENCOUNTER (OUTPATIENT)
Dept: CT IMAGING | Age: 64
Discharge: HOME | End: 2019-03-22
Payer: OTHER GOVERNMENT

## 2019-03-22 VITALS
DIASTOLIC BLOOD PRESSURE: 108 MMHG | SYSTOLIC BLOOD PRESSURE: 205 MMHG | HEART RATE: 84 BPM | BODY MASS INDEX: 35 KG/M2 | RESPIRATION RATE: 18 BRPM | TEMPERATURE: 98.6 F | OXYGEN SATURATION: 94 % | HEIGHT: 71 IN | WEIGHT: 250 LBS

## 2019-03-22 DIAGNOSIS — R29.898 WEAKNESS OF LOWER EXTREMITY, UNSPECIFIED LATERALITY: ICD-10-CM

## 2019-03-22 LAB
ANION GAP SERPL CALCULATED.3IONS-SCNC: 11 MMOL/L (ref 3–16)
BASOPHILS ABSOLUTE: 0.1 K/UL (ref 0–0.2)
BASOPHILS RELATIVE PERCENT: 0.8 %
BILIRUBIN URINE: NEGATIVE
BLOOD, URINE: NEGATIVE
BUN BLDV-MCNC: 10 MG/DL (ref 7–20)
CALCIUM SERPL-MCNC: 9.2 MG/DL (ref 8.3–10.6)
CHLORIDE BLD-SCNC: 102 MMOL/L (ref 99–110)
CLARITY: CLEAR
CO2: 28 MMOL/L (ref 21–32)
COLOR: YELLOW
CREAT SERPL-MCNC: 1.1 MG/DL (ref 0.8–1.3)
EKG ATRIAL RATE: 71 BPM
EKG DIAGNOSIS: NORMAL
EKG P AXIS: 46 DEGREES
EKG P-R INTERVAL: 194 MS
EKG Q-T INTERVAL: 422 MS
EKG QRS DURATION: 142 MS
EKG QTC CALCULATION (BAZETT): 458 MS
EKG R AXIS: -37 DEGREES
EKG T AXIS: 30 DEGREES
EKG VENTRICULAR RATE: 71 BPM
EOSINOPHILS ABSOLUTE: 0.1 K/UL (ref 0–0.6)
EOSINOPHILS RELATIVE PERCENT: 1.3 %
EPITHELIAL CELLS, UA: ABNORMAL /HPF
GFR AFRICAN AMERICAN: >60
GFR NON-AFRICAN AMERICAN: >60
GLUCOSE BLD-MCNC: 87 MG/DL (ref 70–99)
GLUCOSE URINE: NEGATIVE MG/DL
HCT VFR BLD CALC: 38.5 % (ref 40.5–52.5)
HEMOGLOBIN: 11.9 G/DL (ref 13.5–17.5)
INR BLD: 1.11 (ref 0.86–1.14)
KETONES, URINE: NEGATIVE MG/DL
LEUKOCYTE ESTERASE, URINE: NEGATIVE
LYMPHOCYTES ABSOLUTE: 0.9 K/UL (ref 1–5.1)
LYMPHOCYTES RELATIVE PERCENT: 13 %
MCH RBC QN AUTO: 23.2 PG (ref 26–34)
MCHC RBC AUTO-ENTMCNC: 31 G/DL (ref 31–36)
MCV RBC AUTO: 75 FL (ref 80–100)
MICROSCOPIC EXAMINATION: YES
MONOCYTES ABSOLUTE: 0.9 K/UL (ref 0–1.3)
MONOCYTES RELATIVE PERCENT: 12.7 %
NEUTROPHILS ABSOLUTE: 4.9 K/UL (ref 1.7–7.7)
NEUTROPHILS RELATIVE PERCENT: 72.2 %
NITRITE, URINE: NEGATIVE
PDW BLD-RTO: 16.4 % (ref 12.4–15.4)
PH UA: 7 (ref 5–8)
PLATELET # BLD: 255 K/UL (ref 135–450)
PMV BLD AUTO: 7.6 FL (ref 5–10.5)
POTASSIUM REFLEX MAGNESIUM: 3.9 MMOL/L (ref 3.5–5.1)
PROTEIN UA: ABNORMAL MG/DL
PROTHROMBIN TIME: 12.7 SEC (ref 9.8–13)
RBC # BLD: 5.13 M/UL (ref 4.2–5.9)
RBC UA: ABNORMAL /HPF (ref 0–2)
SODIUM BLD-SCNC: 141 MMOL/L (ref 136–145)
SPECIFIC GRAVITY UA: 1.01 (ref 1–1.03)
TROPONIN: <0.01 NG/ML
URINE TYPE: ABNORMAL
UROBILINOGEN, URINE: 0.2 E.U./DL
WBC # BLD: 6.8 K/UL (ref 4–11)
WBC UA: ABNORMAL /HPF (ref 0–5)

## 2019-03-22 PROCEDURE — 70498 CT ANGIOGRAPHY NECK: CPT

## 2019-03-22 PROCEDURE — 71045 X-RAY EXAM CHEST 1 VIEW: CPT

## 2019-03-22 PROCEDURE — 70496 CT ANGIOGRAPHY HEAD: CPT

## 2019-03-22 PROCEDURE — 84484 ASSAY OF TROPONIN QUANT: CPT

## 2019-03-22 PROCEDURE — 85025 COMPLETE CBC W/AUTO DIFF WBC: CPT

## 2019-03-22 PROCEDURE — 93005 ELECTROCARDIOGRAM TRACING: CPT | Performed by: EMERGENCY MEDICINE

## 2019-03-22 PROCEDURE — 72131 CT LUMBAR SPINE W/O DYE: CPT

## 2019-03-22 PROCEDURE — 80048 BASIC METABOLIC PNL TOTAL CA: CPT

## 2019-03-22 PROCEDURE — 6370000000 HC RX 637 (ALT 250 FOR IP): Performed by: EMERGENCY MEDICINE

## 2019-03-22 PROCEDURE — 85610 PROTHROMBIN TIME: CPT

## 2019-03-22 PROCEDURE — 81001 URINALYSIS AUTO W/SCOPE: CPT

## 2019-03-22 RX ORDER — CYCLOBENZAPRINE HCL 10 MG
10 TABLET ORAL 3 TIMES DAILY PRN
COMMUNITY
End: 2021-04-15 | Stop reason: CLARIF

## 2019-03-22 RX ORDER — OXYCODONE AND ACETAMINOPHEN 10; 325 MG/1; MG/1
1 TABLET ORAL EVERY 4 HOURS PRN
COMMUNITY
End: 2021-04-15 | Stop reason: CLARIF

## 2019-03-22 RX ORDER — OXYCODONE HYDROCHLORIDE AND ACETAMINOPHEN 5; 325 MG/1; MG/1
1 TABLET ORAL ONCE
Status: COMPLETED | OUTPATIENT
Start: 2019-03-22 | End: 2019-03-22

## 2019-03-22 RX ADMIN — OXYCODONE HYDROCHLORIDE AND ACETAMINOPHEN 1 TABLET: 5; 325 TABLET ORAL at 02:18

## 2019-03-22 ASSESSMENT — ENCOUNTER SYMPTOMS
BACK PAIN: 1
DIARRHEA: 0
VOMITING: 0
COUGH: 0
ABDOMINAL PAIN: 0
NAUSEA: 0

## 2019-03-22 ASSESSMENT — PAIN SCALES - GENERAL: PAINLEVEL_OUTOF10: 10

## 2019-06-11 ENCOUNTER — APPOINTMENT (OUTPATIENT)
Dept: GENERAL RADIOLOGY | Age: 64
DRG: 305 | End: 2019-06-11
Payer: OTHER GOVERNMENT

## 2019-06-11 ENCOUNTER — APPOINTMENT (OUTPATIENT)
Dept: CT IMAGING | Age: 64
DRG: 305 | End: 2019-06-11
Payer: OTHER GOVERNMENT

## 2019-06-11 ENCOUNTER — HOSPITAL ENCOUNTER (INPATIENT)
Age: 64
LOS: 2 days | Discharge: HOME | DRG: 305 | End: 2019-06-13
Attending: EMERGENCY MEDICINE | Admitting: INTERNAL MEDICINE
Payer: OTHER GOVERNMENT

## 2019-06-11 DIAGNOSIS — M54.5 CHRONIC LOW BACK PAIN, UNSPECIFIED BACK PAIN LATERALITY, WITH SCIATICA PRESENCE UNSPECIFIED: ICD-10-CM

## 2019-06-11 DIAGNOSIS — I16.0 HYPERTENSIVE URGENCY: Primary | ICD-10-CM

## 2019-06-11 DIAGNOSIS — G89.29 CHRONIC LOW BACK PAIN, UNSPECIFIED BACK PAIN LATERALITY, WITH SCIATICA PRESENCE UNSPECIFIED: ICD-10-CM

## 2019-06-11 PROBLEM — I16.1 HYPERTENSIVE EMERGENCY: Status: ACTIVE | Noted: 2019-06-11

## 2019-06-11 LAB
ALBUMIN SERPL-MCNC: 4.4 G/DL (ref 3.4–5)
ALP BLD-CCNC: 101 U/L (ref 40–129)
ALT SERPL-CCNC: 22 U/L (ref 10–40)
ANION GAP SERPL CALCULATED.3IONS-SCNC: 13 MMOL/L (ref 3–16)
AST SERPL-CCNC: 24 U/L (ref 15–37)
BASOPHILS ABSOLUTE: 0 K/UL (ref 0–0.2)
BASOPHILS RELATIVE PERCENT: 0.7 %
BILIRUB SERPL-MCNC: 0.4 MG/DL (ref 0–1)
BILIRUBIN DIRECT: <0.2 MG/DL (ref 0–0.3)
BILIRUBIN URINE: NEGATIVE
BILIRUBIN, INDIRECT: NORMAL MG/DL (ref 0–1)
BLOOD, URINE: NEGATIVE
BUN BLDV-MCNC: 8 MG/DL (ref 7–20)
CALCIUM SERPL-MCNC: 9.7 MG/DL (ref 8.3–10.6)
CHLORIDE BLD-SCNC: 103 MMOL/L (ref 99–110)
CLARITY: CLEAR
CO2: 25 MMOL/L (ref 21–32)
COLOR: YELLOW
CREAT SERPL-MCNC: 0.9 MG/DL (ref 0.8–1.3)
EOSINOPHILS ABSOLUTE: 0 K/UL (ref 0–0.6)
EOSINOPHILS RELATIVE PERCENT: 0.8 %
GFR AFRICAN AMERICAN: >60
GFR NON-AFRICAN AMERICAN: >60
GLUCOSE BLD-MCNC: 135 MG/DL (ref 70–99)
GLUCOSE URINE: NEGATIVE MG/DL
HCT VFR BLD CALC: 42.3 % (ref 40.5–52.5)
HEMOGLOBIN: 12.9 G/DL (ref 13.5–17.5)
KETONES, URINE: NEGATIVE MG/DL
LEUKOCYTE ESTERASE, URINE: NEGATIVE
LIPASE: 20 U/L (ref 13–60)
LYMPHOCYTES ABSOLUTE: 0.8 K/UL (ref 1–5.1)
LYMPHOCYTES RELATIVE PERCENT: 15.7 %
MCH RBC QN AUTO: 23.3 PG (ref 26–34)
MCHC RBC AUTO-ENTMCNC: 30.6 G/DL (ref 31–36)
MCV RBC AUTO: 75.9 FL (ref 80–100)
MICROSCOPIC EXAMINATION: NORMAL
MONOCYTES ABSOLUTE: 0.5 K/UL (ref 0–1.3)
MONOCYTES RELATIVE PERCENT: 10.1 %
NEUTROPHILS ABSOLUTE: 3.6 K/UL (ref 1.7–7.7)
NEUTROPHILS RELATIVE PERCENT: 72.7 %
NITRITE, URINE: NEGATIVE
PDW BLD-RTO: 18.8 % (ref 12.4–15.4)
PH UA: 7.5 (ref 5–8)
PLATELET # BLD: 253 K/UL (ref 135–450)
PMV BLD AUTO: 7.6 FL (ref 5–10.5)
POTASSIUM REFLEX MAGNESIUM: 4.2 MMOL/L (ref 3.5–5.1)
PROTEIN UA: NEGATIVE MG/DL
RBC # BLD: 5.57 M/UL (ref 4.2–5.9)
SODIUM BLD-SCNC: 141 MMOL/L (ref 136–145)
SPECIFIC GRAVITY UA: 1.01 (ref 1–1.03)
TOTAL PROTEIN: 7.6 G/DL (ref 6.4–8.2)
TROPONIN: <0.01 NG/ML
URINE REFLEX TO CULTURE: NORMAL
URINE TYPE: NORMAL
UROBILINOGEN, URINE: 0.2 E.U./DL
WBC # BLD: 5 K/UL (ref 4–11)

## 2019-06-11 PROCEDURE — 70450 CT HEAD/BRAIN W/O DYE: CPT

## 2019-06-11 PROCEDURE — 96374 THER/PROPH/DIAG INJ IV PUSH: CPT

## 2019-06-11 PROCEDURE — 81003 URINALYSIS AUTO W/O SCOPE: CPT

## 2019-06-11 PROCEDURE — 96375 TX/PRO/DX INJ NEW DRUG ADDON: CPT

## 2019-06-11 PROCEDURE — 96376 TX/PRO/DX INJ SAME DRUG ADON: CPT

## 2019-06-11 PROCEDURE — 84484 ASSAY OF TROPONIN QUANT: CPT

## 2019-06-11 PROCEDURE — 1200000000 HC SEMI PRIVATE

## 2019-06-11 PROCEDURE — 6360000002 HC RX W HCPCS: Performed by: EMERGENCY MEDICINE

## 2019-06-11 PROCEDURE — 99291 CRITICAL CARE FIRST HOUR: CPT

## 2019-06-11 PROCEDURE — 80048 BASIC METABOLIC PNL TOTAL CA: CPT

## 2019-06-11 PROCEDURE — 80076 HEPATIC FUNCTION PANEL: CPT

## 2019-06-11 PROCEDURE — 71046 X-RAY EXAM CHEST 2 VIEWS: CPT

## 2019-06-11 PROCEDURE — 6370000000 HC RX 637 (ALT 250 FOR IP): Performed by: EMERGENCY MEDICINE

## 2019-06-11 PROCEDURE — 93005 ELECTROCARDIOGRAM TRACING: CPT | Performed by: EMERGENCY MEDICINE

## 2019-06-11 PROCEDURE — 2500000003 HC RX 250 WO HCPCS: Performed by: EMERGENCY MEDICINE

## 2019-06-11 PROCEDURE — 85025 COMPLETE CBC W/AUTO DIFF WBC: CPT

## 2019-06-11 PROCEDURE — 83690 ASSAY OF LIPASE: CPT

## 2019-06-11 RX ORDER — ONDANSETRON 2 MG/ML
4 INJECTION INTRAMUSCULAR; INTRAVENOUS EVERY 6 HOURS PRN
Status: DISCONTINUED | OUTPATIENT
Start: 2019-06-11 | End: 2019-06-13 | Stop reason: HOSPADM

## 2019-06-11 RX ORDER — ACETAMINOPHEN 325 MG/1
650 TABLET ORAL EVERY 4 HOURS PRN
Status: DISCONTINUED | OUTPATIENT
Start: 2019-06-11 | End: 2019-06-13 | Stop reason: HOSPADM

## 2019-06-11 RX ORDER — PANTOPRAZOLE SODIUM 40 MG/1
40 TABLET, DELAYED RELEASE ORAL DAILY PRN
COMMUNITY
End: 2021-04-15 | Stop reason: CLARIF

## 2019-06-11 RX ORDER — PROCHLORPERAZINE EDISYLATE 5 MG/ML
10 INJECTION INTRAMUSCULAR; INTRAVENOUS ONCE
Status: DISCONTINUED | OUTPATIENT
Start: 2019-06-11 | End: 2019-06-11

## 2019-06-11 RX ORDER — SODIUM CHLORIDE 0.9 % (FLUSH) 0.9 %
10 SYRINGE (ML) INJECTION PRN
Status: DISCONTINUED | OUTPATIENT
Start: 2019-06-11 | End: 2019-06-13 | Stop reason: HOSPADM

## 2019-06-11 RX ORDER — HYDRALAZINE HYDROCHLORIDE 20 MG/ML
5 INJECTION INTRAMUSCULAR; INTRAVENOUS EVERY 6 HOURS PRN
Status: DISCONTINUED | OUTPATIENT
Start: 2019-06-11 | End: 2019-06-12

## 2019-06-11 RX ORDER — ATORVASTATIN CALCIUM 40 MG/1
40 TABLET, FILM COATED ORAL NIGHTLY
Status: DISCONTINUED | OUTPATIENT
Start: 2019-06-11 | End: 2019-06-13 | Stop reason: HOSPADM

## 2019-06-11 RX ORDER — SODIUM CHLORIDE 0.9 % (FLUSH) 0.9 %
10 SYRINGE (ML) INJECTION EVERY 12 HOURS SCHEDULED
Status: DISCONTINUED | OUTPATIENT
Start: 2019-06-11 | End: 2019-06-13 | Stop reason: HOSPADM

## 2019-06-11 RX ORDER — LABETALOL 20 MG/4 ML (5 MG/ML) INTRAVENOUS SYRINGE
10 ONCE
Status: COMPLETED | OUTPATIENT
Start: 2019-06-11 | End: 2019-06-11

## 2019-06-11 RX ORDER — ONDANSETRON 2 MG/ML
4 INJECTION INTRAMUSCULAR; INTRAVENOUS EVERY 6 HOURS PRN
Status: DISCONTINUED | OUTPATIENT
Start: 2019-06-11 | End: 2019-06-11

## 2019-06-11 RX ORDER — CYCLOBENZAPRINE HCL 10 MG
10 TABLET ORAL 3 TIMES DAILY PRN
Status: DISCONTINUED | OUTPATIENT
Start: 2019-06-11 | End: 2019-06-13 | Stop reason: HOSPADM

## 2019-06-11 RX ORDER — OXYCODONE AND ACETAMINOPHEN 10; 325 MG/1; MG/1
1 TABLET ORAL EVERY 4 HOURS PRN
Status: DISCONTINUED | OUTPATIENT
Start: 2019-06-12 | End: 2019-06-13 | Stop reason: HOSPADM

## 2019-06-11 RX ORDER — PANTOPRAZOLE SODIUM 40 MG/1
40 TABLET, DELAYED RELEASE ORAL DAILY PRN
Status: DISCONTINUED | OUTPATIENT
Start: 2019-06-11 | End: 2019-06-13 | Stop reason: HOSPADM

## 2019-06-11 RX ORDER — LISINOPRIL 40 MG/1
40 TABLET ORAL DAILY
Status: DISCONTINUED | OUTPATIENT
Start: 2019-06-12 | End: 2019-06-11

## 2019-06-11 RX ORDER — ACETAMINOPHEN 325 MG/1
650 TABLET ORAL EVERY 4 HOURS PRN
Status: DISCONTINUED | OUTPATIENT
Start: 2019-06-11 | End: 2019-06-11

## 2019-06-11 RX ORDER — DIPHENHYDRAMINE HYDROCHLORIDE 50 MG/ML
25 INJECTION INTRAMUSCULAR; INTRAVENOUS ONCE
Status: COMPLETED | OUTPATIENT
Start: 2019-06-11 | End: 2019-06-11

## 2019-06-11 RX ORDER — LISINOPRIL 40 MG/1
40 TABLET ORAL DAILY
Status: DISCONTINUED | OUTPATIENT
Start: 2019-06-11 | End: 2019-06-13

## 2019-06-11 RX ORDER — OXYCODONE AND ACETAMINOPHEN 10; 325 MG/1; MG/1
1 TABLET ORAL EVERY 4 HOURS PRN
Status: DISCONTINUED | OUTPATIENT
Start: 2019-06-11 | End: 2019-06-11

## 2019-06-11 RX ORDER — SENNA PLUS 8.6 MG/1
1 TABLET ORAL DAILY PRN
Status: DISCONTINUED | OUTPATIENT
Start: 2019-06-11 | End: 2019-06-13 | Stop reason: HOSPADM

## 2019-06-11 RX ORDER — MORPHINE SULFATE 4 MG/ML
4 INJECTION, SOLUTION INTRAMUSCULAR; INTRAVENOUS ONCE
Status: COMPLETED | OUTPATIENT
Start: 2019-06-11 | End: 2019-06-11

## 2019-06-11 RX ADMIN — LISINOPRIL 40 MG: 40 TABLET ORAL at 22:00

## 2019-06-11 RX ADMIN — HYDROMORPHONE HYDROCHLORIDE 0.5 MG: 1 INJECTION, SOLUTION INTRAMUSCULAR; INTRAVENOUS; SUBCUTANEOUS at 20:17

## 2019-06-11 RX ADMIN — LABETALOL 20 MG/4 ML (5 MG/ML) INTRAVENOUS SYRINGE 10 MG: at 20:02

## 2019-06-11 RX ADMIN — DIPHENHYDRAMINE HYDROCHLORIDE 25 MG: 50 INJECTION, SOLUTION INTRAMUSCULAR; INTRAVENOUS at 19:01

## 2019-06-11 RX ADMIN — HYDROMORPHONE HYDROCHLORIDE 1 MG: 1 INJECTION, SOLUTION INTRAMUSCULAR; INTRAVENOUS; SUBCUTANEOUS at 22:20

## 2019-06-11 RX ADMIN — LABETALOL 20 MG/4 ML (5 MG/ML) INTRAVENOUS SYRINGE 10 MG: at 20:40

## 2019-06-11 RX ADMIN — MORPHINE SULFATE 4 MG: 4 INJECTION, SOLUTION INTRAMUSCULAR; INTRAVENOUS at 19:01

## 2019-06-11 ASSESSMENT — PAIN DESCRIPTION - DESCRIPTORS
DESCRIPTORS: ACHING;CONSTANT
DESCRIPTORS: ACHING;CONSTANT
DESCRIPTORS: HEADACHE;ACHING
DESCRIPTORS: CONSTANT

## 2019-06-11 ASSESSMENT — PAIN DESCRIPTION - LOCATION
LOCATION: HEAD
LOCATION: HEAD;BACK

## 2019-06-11 ASSESSMENT — ENCOUNTER SYMPTOMS
ABDOMINAL DISTENTION: 1
PHOTOPHOBIA: 0
EYE DISCHARGE: 0
SHORTNESS OF BREATH: 0
ABDOMINAL PAIN: 0
CHEST TIGHTNESS: 0
BACK PAIN: 1

## 2019-06-11 ASSESSMENT — PAIN DESCRIPTION - PAIN TYPE
TYPE: ACUTE PAIN
TYPE: ACUTE PAIN;CHRONIC PAIN

## 2019-06-11 ASSESSMENT — PAIN SCALES - GENERAL
PAINLEVEL_OUTOF10: 9
PAINLEVEL_OUTOF10: 10
PAINLEVEL_OUTOF10: 8
PAINLEVEL_OUTOF10: 8

## 2019-06-11 ASSESSMENT — PAIN DESCRIPTION - PROGRESSION
CLINICAL_PROGRESSION: GRADUALLY WORSENING
CLINICAL_PROGRESSION: GRADUALLY WORSENING
CLINICAL_PROGRESSION: NOT CHANGED

## 2019-06-11 ASSESSMENT — PAIN DESCRIPTION - FREQUENCY: FREQUENCY: INTERMITTENT

## 2019-06-11 NOTE — ED TRIAGE NOTES
Pt states woke up this morning with a headache and blood pressure elevated. Pt's bp on arrival 231/148 8/10 pain. PT also c/o left hip pain but states he has a yaneth in it. Pt also c/o blurry vision and light headed.

## 2019-06-11 NOTE — ED NOTES
Bed: A08-08  Expected date:   Expected time:   Means of arrival:   Comments:  Janet Louis RN  06/11/19 6718

## 2019-06-11 NOTE — ED PROVIDER NOTES
Date of evaluation: 6/11/2019    Chief Complaint   Headache (since this morning) and Hypertension (160's/100's at home)      Nursing Notes, Past Medical Hx, Past Surgical Hx, Social Hx, Allergies, and Family Hx were reviewed. History of Present Illness     Constance Mars is a 59 y.o. male who presents with a headache. It started earlier today. Not the worst headache of his life it was gradual onset. He states he had blurred vision with it. He notices blood pressure high at home and was concerned with the symptoms and his blood pressure that he sought care. He denies any chest pain or shortness of breath. His chronic back pain from previous injury    Review of Systems     Review of Systems   Constitutional: Negative for activity change, appetite change, fatigue and fever. Eyes: Positive for visual disturbance. Negative for photophobia and discharge. Respiratory: Negative for chest tightness and shortness of breath. Cardiovascular: Negative for chest pain. Gastrointestinal: Positive for abdominal distention. Negative for abdominal pain. Genitourinary: Negative. Musculoskeletal: Positive for back pain. Neurological: Positive for headaches. Hematological: Negative. Psychiatric/Behavioral: Negative. All other systems reviewed and are negative. Past Medical, Surgical, Family, and Social History     He has a past medical history of DJD (degenerative joint disease), HYPERCHOLESTERAEMIA, Hypertension, Sarcoidosis, and Spinal cord injuries. He has a past surgical history that includes back surgery; hernia repair; Gallbladder surgery; other surgical history (05/01/2018); Upper gastrointestinal endoscopy (12/24/2018); and Colonoscopy (12/24/2018). His family history is not on file. He reports that he quit smoking about 4 years ago. He has never used smokeless tobacco. He reports that he does not drink alcohol or use drugs.     Medications     Previous Medications    ATORVASTATIN rhythm with right bundle branch block     RADIOLOGY:  CT Head WO Contrast   Final Result      1. No acute intracranial process. XR CHEST STANDARD (2 VW)   Final Result      Mild pulmonary vascular congestion. No acute pulmonary consolidation. LABS:   Labs Reviewed   CBC WITH AUTO DIFFERENTIAL - Abnormal; Notable for the following components:       Result Value    Hemoglobin 12.9 (*)     MCV 75.9 (*)     MCH 23.3 (*)     MCHC 30.6 (*)     RDW 18.8 (*)     Lymphocytes # 0.8 (*)     All other components within normal limits    Narrative:     Performed at: [facility]  [address], [facility] [address]   Phone [phone]   BASIC METABOLIC PANEL W/ REFLEX TO MG FOR LOW K - Abnormal; Notable for the following components:    Glucose 135 (*)     All other components within normal limits    Narrative:     Performed at: [facility]  [address], [facility] [address]   Phone [phone]   TROPONIN    Narrative:     Performed at: [facility]  [address], [facility] [address]   Phone [phone]   URINE RT REFLEX TO CULTURE    Narrative:     Performed at: [facility]  [address], [facility] [address]   Phone [phone]   HEPATIC FUNCTION PANEL    Narrative:     Performed at: [facility]  [address], [facility] [address]   Phone [phone]   LIPASE    Narrative:     Performed at:   [facility]  [address], [facility] [address]   Phone [phone]       ED BEDSIDE ULTRASOUND:      RECENT VITALS:  BP: (!) 180/113, Temp: 98 °F (36.7 °C), Pulse: 72, Resp: 20     Procedures       ED Course     The patient was given the followingmedications:  Orders Placed This Encounter   Medications    DISCONTD: prochlorperazine (COMPAZINE) injection 10 mg    diphenhydrAMINE (BENADRYL) injection 25 mg    morphine injection 4 mg    DISCONTD: ondansetron (ZOFRAN) injection 4 mg    labetalol (NORMODYNE;TRANDATE) injection syringe 10 mg    HYDROmorphone (DILAUDID) injection 0.5 mg    labetalol (NORMODYNE;TRANDATE) injection syringe 10 mg    lisinopril (PRINIVIL;ZESTRIL) tablet 40 mg            CONSULTS:  IP CONSULT TO HOSPITALIST  IP CONSULT TO RESIDENT INTERNAL MEDICINE    MEDICAL DECISION MAKING     Nkechi Lee is a 59 y.o. male resents with headache that was gradual onset not thunderclap as well as hypertension. His blood pressure was 240/135. He states he was taking his blood pressure medicine and did not miss any doses. Denies chest pain or shortness of breath. Head CT was negative. He had a CTA that showed no aneurysms a couple of months ago that was done for weakness. He is awake alert does have some lower extremity. Do not feel this is subarachnoid hemorrhage based on his gradual onset of this. I feel is most likely hypertensive headache. He was given morphine and Benadryl initially. He was given labetalol 10 mg IV. Spoke with the hospitalist and will give 40 mg of lisinopril p.o. I did not want to drop his blood pressure any further at this time. I have a call into the [facility] for admission    Critical Care:  Due to the immediate potential for life-threatening deterioration due to hypertensive urgency, I spent35 minutes providingcritical care. This time is excluding time spent performing procedures. Clinical Impression     1. Hypertensive urgency        Disposition/Plan     PATIENT REFERRED TO:  No follow-up provider specified.     DISCHARGE MEDICATIONS:  New Prescriptions    No medications on file       DISPOSITION        Frank Simms MD  06/11/19 [id number]

## 2019-06-12 LAB
AMPHETAMINE SCREEN, URINE: ABNORMAL
ANION GAP SERPL CALCULATED.3IONS-SCNC: 13 MMOL/L (ref 3–16)
BARBITURATE SCREEN URINE: ABNORMAL
BASOPHILS ABSOLUTE: 0 K/UL (ref 0–0.2)
BASOPHILS RELATIVE PERCENT: 0.4 %
BENZODIAZEPINE SCREEN, URINE: ABNORMAL
BUN BLDV-MCNC: 8 MG/DL (ref 7–20)
CALCIUM SERPL-MCNC: 9.4 MG/DL (ref 8.3–10.6)
CANNABINOID SCREEN URINE: ABNORMAL
CHLORIDE BLD-SCNC: 103 MMOL/L (ref 99–110)
CO2: 24 MMOL/L (ref 21–32)
COCAINE METABOLITE SCREEN URINE: ABNORMAL
CREAT SERPL-MCNC: 0.9 MG/DL (ref 0.8–1.3)
EKG ATRIAL RATE: 84 BPM
EKG DIAGNOSIS: NORMAL
EKG P AXIS: 21 DEGREES
EKG P-R INTERVAL: 168 MS
EKG Q-T INTERVAL: 418 MS
EKG QRS DURATION: 140 MS
EKG QTC CALCULATION (BAZETT): 493 MS
EKG R AXIS: -40 DEGREES
EKG T AXIS: 23 DEGREES
EKG VENTRICULAR RATE: 84 BPM
EOSINOPHILS ABSOLUTE: 0 K/UL (ref 0–0.6)
EOSINOPHILS RELATIVE PERCENT: 0.1 %
GFR AFRICAN AMERICAN: >60
GFR NON-AFRICAN AMERICAN: >60
GLUCOSE BLD-MCNC: 142 MG/DL (ref 70–99)
HCT VFR BLD CALC: 41.8 % (ref 40.5–52.5)
HEMOGLOBIN: 13 G/DL (ref 13.5–17.5)
LYMPHOCYTES ABSOLUTE: 0.6 K/UL (ref 1–5.1)
LYMPHOCYTES RELATIVE PERCENT: 8.2 %
Lab: ABNORMAL
MCH RBC QN AUTO: 23.3 PG (ref 26–34)
MCHC RBC AUTO-ENTMCNC: 31.1 G/DL (ref 31–36)
MCV RBC AUTO: 74.8 FL (ref 80–100)
METHADONE SCREEN, URINE: ABNORMAL
MONOCYTES ABSOLUTE: 0.4 K/UL (ref 0–1.3)
MONOCYTES RELATIVE PERCENT: 5.3 %
NEUTROPHILS ABSOLUTE: 6.4 K/UL (ref 1.7–7.7)
NEUTROPHILS RELATIVE PERCENT: 86 %
OPIATE SCREEN URINE: POSITIVE
OXYCODONE URINE: POSITIVE
PDW BLD-RTO: 18.7 % (ref 12.4–15.4)
PH UA: 7
PHENCYCLIDINE SCREEN URINE: ABNORMAL
PLATELET # BLD: 265 K/UL (ref 135–450)
PMV BLD AUTO: 7.6 FL (ref 5–10.5)
POTASSIUM REFLEX MAGNESIUM: 4.1 MMOL/L (ref 3.5–5.1)
PROPOXYPHENE SCREEN: ABNORMAL
RBC # BLD: 5.59 M/UL (ref 4.2–5.9)
SODIUM BLD-SCNC: 140 MMOL/L (ref 136–145)
WBC # BLD: 7.4 K/UL (ref 4–11)

## 2019-06-12 PROCEDURE — 6360000002 HC RX W HCPCS: Performed by: STUDENT IN AN ORGANIZED HEALTH CARE EDUCATION/TRAINING PROGRAM

## 2019-06-12 PROCEDURE — 2500000003 HC RX 250 WO HCPCS

## 2019-06-12 PROCEDURE — 6370000000 HC RX 637 (ALT 250 FOR IP): Performed by: INTERNAL MEDICINE

## 2019-06-12 PROCEDURE — 6370000000 HC RX 637 (ALT 250 FOR IP): Performed by: STUDENT IN AN ORGANIZED HEALTH CARE EDUCATION/TRAINING PROGRAM

## 2019-06-12 PROCEDURE — 36415 COLL VENOUS BLD VENIPUNCTURE: CPT

## 2019-06-12 PROCEDURE — 1200000000 HC SEMI PRIVATE

## 2019-06-12 PROCEDURE — 99221 1ST HOSP IP/OBS SF/LOW 40: CPT | Performed by: INTERNAL MEDICINE

## 2019-06-12 PROCEDURE — 2580000003 HC RX 258: Performed by: STUDENT IN AN ORGANIZED HEALTH CARE EDUCATION/TRAINING PROGRAM

## 2019-06-12 PROCEDURE — 2500000003 HC RX 250 WO HCPCS: Performed by: STUDENT IN AN ORGANIZED HEALTH CARE EDUCATION/TRAINING PROGRAM

## 2019-06-12 PROCEDURE — 80307 DRUG TEST PRSMV CHEM ANLYZR: CPT

## 2019-06-12 PROCEDURE — 80048 BASIC METABOLIC PNL TOTAL CA: CPT

## 2019-06-12 PROCEDURE — 85025 COMPLETE CBC W/AUTO DIFF WBC: CPT

## 2019-06-12 RX ORDER — HYDRALAZINE HYDROCHLORIDE 20 MG/ML
5 INJECTION INTRAMUSCULAR; INTRAVENOUS EVERY 4 HOURS PRN
Status: DISCONTINUED | OUTPATIENT
Start: 2019-06-12 | End: 2019-06-12

## 2019-06-12 RX ORDER — LABETALOL 20 MG/4 ML (5 MG/ML) INTRAVENOUS SYRINGE
10 EVERY 4 HOURS PRN
Status: DISCONTINUED | OUTPATIENT
Start: 2019-06-12 | End: 2019-06-12

## 2019-06-12 RX ORDER — SENNA AND DOCUSATE SODIUM 50; 8.6 MG/1; MG/1
2 TABLET, FILM COATED ORAL DAILY
Status: DISCONTINUED | OUTPATIENT
Start: 2019-06-12 | End: 2019-06-13 | Stop reason: HOSPADM

## 2019-06-12 RX ORDER — AMLODIPINE BESYLATE 5 MG/1
5 TABLET ORAL ONCE
Status: COMPLETED | OUTPATIENT
Start: 2019-06-12 | End: 2019-06-12

## 2019-06-12 RX ORDER — KETOROLAC TROMETHAMINE 30 MG/ML
30 INJECTION, SOLUTION INTRAMUSCULAR; INTRAVENOUS ONCE
Status: COMPLETED | OUTPATIENT
Start: 2019-06-12 | End: 2019-06-12

## 2019-06-12 RX ORDER — LABETALOL 20 MG/4 ML (5 MG/ML) INTRAVENOUS SYRINGE
20 EVERY 4 HOURS PRN
Status: DISCONTINUED | OUTPATIENT
Start: 2019-06-12 | End: 2019-06-13 | Stop reason: HOSPADM

## 2019-06-12 RX ORDER — LABETALOL 20 MG/4 ML (5 MG/ML) INTRAVENOUS SYRINGE
20 EVERY 4 HOURS PRN
Status: DISCONTINUED | OUTPATIENT
Start: 2019-06-12 | End: 2019-06-12

## 2019-06-12 RX ADMIN — CYCLOBENZAPRINE HYDROCHLORIDE 10 MG: 10 TABLET, FILM COATED ORAL at 21:33

## 2019-06-12 RX ADMIN — Medication 10 ML: at 21:00

## 2019-06-12 RX ADMIN — OXYCODONE AND ACETAMINOPHEN 1 TABLET: 10; 325 TABLET ORAL at 21:33

## 2019-06-12 RX ADMIN — Medication 10 ML: at 10:15

## 2019-06-12 RX ADMIN — Medication 10 ML: at 00:16

## 2019-06-12 RX ADMIN — LISINOPRIL 40 MG: 40 TABLET ORAL at 08:09

## 2019-06-12 RX ADMIN — OXYCODONE AND ACETAMINOPHEN 1 TABLET: 10; 325 TABLET ORAL at 12:55

## 2019-06-12 RX ADMIN — ENOXAPARIN SODIUM 40 MG: 40 INJECTION SUBCUTANEOUS at 08:09

## 2019-06-12 RX ADMIN — CYCLOBENZAPRINE HYDROCHLORIDE 10 MG: 10 TABLET, FILM COATED ORAL at 17:21

## 2019-06-12 RX ADMIN — CYCLOBENZAPRINE HYDROCHLORIDE 10 MG: 10 TABLET, FILM COATED ORAL at 08:09

## 2019-06-12 RX ADMIN — LABETALOL 20 MG/4 ML (5 MG/ML) INTRAVENOUS SYRINGE 10 MG: at 01:53

## 2019-06-12 RX ADMIN — OXYCODONE AND ACETAMINOPHEN 1 TABLET: 10; 325 TABLET ORAL at 17:21

## 2019-06-12 RX ADMIN — ATORVASTATIN CALCIUM 40 MG: 40 TABLET, FILM COATED ORAL at 21:33

## 2019-06-12 RX ADMIN — AMLODIPINE BESYLATE 5 MG: 5 TABLET ORAL at 01:26

## 2019-06-12 RX ADMIN — HYDRALAZINE HYDROCHLORIDE 5 MG: 20 INJECTION INTRAMUSCULAR; INTRAVENOUS at 00:16

## 2019-06-12 RX ADMIN — ONDANSETRON 4 MG: 2 INJECTION INTRAMUSCULAR; INTRAVENOUS at 02:59

## 2019-06-12 RX ADMIN — KETOROLAC TROMETHAMINE 30 MG: 30 INJECTION, SOLUTION INTRAMUSCULAR at 03:55

## 2019-06-12 RX ADMIN — NICARDIPINE HYDROCHLORIDE 5 MG/HR: 0.1 INJECTION, SOLUTION INTRAVENOUS at 02:51

## 2019-06-12 RX ADMIN — OXYCODONE AND ACETAMINOPHEN 1 TABLET: 10; 325 TABLET ORAL at 08:09

## 2019-06-12 RX ADMIN — STANDARDIZED SENNA CONCENTRATE 8.6 MG: 8.6 TABLET ORAL at 10:15

## 2019-06-12 ASSESSMENT — PAIN DESCRIPTION - PROGRESSION
CLINICAL_PROGRESSION: GRADUALLY WORSENING
CLINICAL_PROGRESSION: GRADUALLY WORSENING
CLINICAL_PROGRESSION: NOT CHANGED
CLINICAL_PROGRESSION: NOT CHANGED
CLINICAL_PROGRESSION: GRADUALLY WORSENING
CLINICAL_PROGRESSION: NOT CHANGED
CLINICAL_PROGRESSION: NOT CHANGED
CLINICAL_PROGRESSION: GRADUALLY WORSENING

## 2019-06-12 ASSESSMENT — PAIN SCALES - GENERAL
PAINLEVEL_OUTOF10: 8
PAINLEVEL_OUTOF10: 7
PAINLEVEL_OUTOF10: 8
PAINLEVEL_OUTOF10: 8
PAINLEVEL_OUTOF10: 6
PAINLEVEL_OUTOF10: 8
PAINLEVEL_OUTOF10: 0
PAINLEVEL_OUTOF10: 6
PAINLEVEL_OUTOF10: 7
PAINLEVEL_OUTOF10: 7
PAINLEVEL_OUTOF10: 0

## 2019-06-12 ASSESSMENT — ENCOUNTER SYMPTOMS
EYE ITCHING: 0
CONSTIPATION: 0
SHORTNESS OF BREATH: 0
DIARRHEA: 0
VOMITING: 0
ABDOMINAL PAIN: 0
COUGH: 0
EYE PAIN: 0
PHOTOPHOBIA: 1
BLOOD IN STOOL: 0
NAUSEA: 0
WHEEZING: 0
STRIDOR: 0
CHEST TIGHTNESS: 0

## 2019-06-12 ASSESSMENT — PAIN DESCRIPTION - FREQUENCY
FREQUENCY: CONTINUOUS

## 2019-06-12 ASSESSMENT — PAIN DESCRIPTION - LOCATION
LOCATION: BACK
LOCATION: BACK
LOCATION: HEAD
LOCATION: BACK
LOCATION: HEAD
LOCATION: BACK
LOCATION: BACK

## 2019-06-12 ASSESSMENT — PAIN DESCRIPTION - PAIN TYPE
TYPE: CHRONIC PAIN
TYPE: ACUTE PAIN
TYPE: CHRONIC PAIN
TYPE: CHRONIC PAIN
TYPE: ACUTE PAIN
TYPE: ACUTE PAIN;CHRONIC PAIN
TYPE: CHRONIC PAIN

## 2019-06-12 ASSESSMENT — PAIN DESCRIPTION - ONSET
ONSET: ON-GOING

## 2019-06-12 ASSESSMENT — PAIN DESCRIPTION - ORIENTATION
ORIENTATION: LOWER
ORIENTATION: MID
ORIENTATION: LOWER
ORIENTATION: LOWER
ORIENTATION: MID
ORIENTATION: LOWER
ORIENTATION: LOWER

## 2019-06-12 ASSESSMENT — PAIN DESCRIPTION - DESCRIPTORS
DESCRIPTORS: HEADACHE
DESCRIPTORS: ACHING;DISCOMFORT
DESCRIPTORS: ACHING
DESCRIPTORS: HEADACHE
DESCRIPTORS: ACHING;DISCOMFORT

## 2019-06-12 NOTE — CONSULTS
ICU Consult PGY-1 Note       Hospital Day:   ICU Day:                                                          Code:Full Code  Admit Date: 6/11/2019  PCP: [facility]                                  CC: headache/photophobia    HISTORY OF PRESENT ILLNESS:     Mr. Waynard Boast is a 60 yo M with a PMH of HTN, sarcoidosis, spinal stenosis and spinal cord injury and history of NSTEMI that presents with headache and elevated blood pressure. Patient stated that he woke up with a headache that would not go away with pain medication which progressively worsened. He also checked his BP and SBP was elevated to the 160s and decided to come to ED. On presentation in ED patient endorsed headache, mild nausea and photophobia. BP was found to be 231/148. Patient was initially treated with IV doses of antihypertensives but patient BP remained elevated and required a drip. Patient was transferred to ICU and started on nicardipine gtt. Today: No acute events overnight. Patient states that his headache is much improved along with his photophobia. Denies any chest pain or SOB. Patient does endorse mild nonproductive cough. No further complaints. BP improved and was able to come off nicardipine gtt. Will be transferred to floor.          PAST HISTORY:     Past Medical History:   Diagnosis Date    DJD (degenerative joint disease)     HYPERCHOLESTERAEMIA     Hypertension     Sarcoidosis     Spinal cord injuries      FALL       Past Surgical History:   Procedure Laterality Date    BACK SURGERY      COLONOSCOPY  12/24/2018    COLONOSCOPY POLYPECTOMY SNARE/COLD BIOPSY performed by [facility] at [facility]      inguinal    OTHER SURGICAL HISTORY  [facility]/01/2018    [facility]    UPPER GASTROINTESTINAL ENDOSCOPY  12/24/2018    COLONOSCOPY SUBMUCOSAL/BOTOX INJECTION performed by Charity Collier MD at [facility]":   The patient lives at    Alcohol:  Illicit drugs: no use  Tobacco:      Family History:  History reviewed. No pertinent family history. MEDICATIONS:     No current facility-administered medications on file prior to encounter. Current Outpatient Medications on File Prior to Encounter   Medication Sig Dispense Refill    pantoprazole (PROTONIX) 40 MG tablet Take 40 mg by mouth daily as needed (before breakfast)      cyclobenzaprine (FLEXERIL) 10 MG tablet Take 10 mg by mouth 3 times daily as needed for Muscle spasms      oxyCODONE-acetaminophen (PERCOCET)  MG per tablet Take 1 tablet by mouth every 4 hours as needed for Pain.  atorvastatin (LIPITOR) 40 MG tablet Take 1 tablet by mouth nightly 30 tablet 3    lisinopril (PRINIVIL;ZESTRIL) 40 MG tablet Take 40 mg by mouth daily            Scheduled Meds:   lisinopril  40 mg Oral Daily    atorvastatin  40 mg Oral Nightly    sodium chloride flush  10 mL Intravenous 2 times per day    enoxaparin  40 mg Subcutaneous Daily      Continuous Infusions:   niCARdipine Stopped (06/12/19 0628)     PRN Meds:hydrALAZINE, labetalol, cyclobenzaprine, pantoprazole, sodium chloride flush, ondansetron, senna, oxyCODONE-acetaminophen, acetaminophen    Allergies: Allergies   Allergen Reactions    Baclofen      Messes with my urine    Robaxin [Methocarbamol]      Irritates my stomach       REVIEW OF SYSTEMS:       History obtained from the patient    Review of Systems   Constitutional: Negative for chills and fever. Eyes: Positive for photophobia. Respiratory: Negative for chest tightness and shortness of breath. Cardiovascular: Negative for chest pain and leg swelling. Neurological: Positive for headaches. Negative for facial asymmetry.        PHYSICAL EXAM:       Vitals: /85   Pulse 82   Temp 98.3 °F (36.8 °C) (Oral)   Resp 19   Ht 5' 11\" (1.803 m)   Wt 263 lb 7.2 oz (119.5 kg)   SpO2 96%   BMI 36.74 kg/m²     I/O:      Intake/Output Summary (Last 24 hours) at 6/12/2019 0700  Last data filed at 6/12/2019 0503  Gross per 24 hour   Intake 300.85 ml   Output 1125 ml   Net -824.15 ml     I/O this shift:  In: 300.9 [P.O.:200; I.V.:100.9]  Out: 1125 [Urine:1125]  No intake/output data recorded. Physical Examination:     Physical Exam   Constitutional: He is oriented to person, place, and time. No distress. HENT:   Head: Normocephalic and atraumatic. Eyes: Conjunctivae and EOM are normal.   Neck: Normal range of motion. Neck supple. Cardiovascular: Normal rate and normal heart sounds. Pulmonary/Chest: Effort normal and breath sounds normal.   Abdominal: Soft. Bowel sounds are normal.   Musculoskeletal: He exhibits edema. He exhibits no deformity. Neurological: He is alert and oriented to person, place, and time. Skin: Skin is warm and dry. He is not diaphoretic. No results for input(s): PHART, EWR9YPM, PO2ART in the last 72 hours. DATA:       Labs:    CBC:   Recent Labs     06/11/19 1835 06/12/19 0427   WBC 5.0 7.4   HGB 12.9* 13.0*   HCT 42.3 41.8    265       BMP:   Recent Labs     06/11/19 1835 06/12/19 0427    140   K 4.2 4.1    103   CO2 25 24   BUN 8 8   CREATININE 0.9 0.9   GLUCOSE 135* 142*     LFT's:   Recent Labs     06/11/19 1835   AST 24   ALT 22   BILITOT 0.4   ALKPHOS 101     Troponin:   Recent Labs     06/11/19 1835   TROPONINI <0.01     BNP:No results for input(s): BNP in the last 72 hours. ABGs: No results for input(s): PHART, SGY5XVN, PO2ART in the last 72 hours. INR: No results for input(s): INR in the last 72 hours. U/A:  Recent Labs     06/11/19 1835 06/12/19  0205   COLORU Yellow  --    PHUR 7.5 7.0   CLARITYU Clear  --    SPECGRAV 1.010  --    LEUKOCYTESUR Negative  --    UROBILINOGEN 0.2  --    BILIRUBINUR Negative  --    BLOODU Negative  --    GLUCOSEU Negative  --        CT Head WO Contrast   Final Result      1. No acute intracranial process.          XR CHEST STANDARD (2 VW)   Final Result Mild pulmonary vascular congestion. No acute pulmonary consolidation. ASSESSMENT AND PLAN:     HTN emergency  Patient BP on initial presentation in ED was 231/148 with headache and blurry vision. BP was initially treated with IV antihypertensives but required transfer to ICU for nicardipine gtt. CXR showed mild pulmonary congestion.   - currently off nicardipine gtt as SBP was in 120s this morning  - continue lisinopril 40 daily  - IV labetalol and hydralazine PRN for SBP > 160   - UDS (+) for opiates     HLD  - atorvastatin     H/o spinal cord injury with residual LE weakness and back pain  - continue home percocet   - continue home cyclobenzaprine         Code Status:Full Code  PPX:  lovenox  DISPO: ICU     This patient has been staffed and discussed with Ricky Joseph MD  -----------------------------  Digna Milton, PGY-1  6/12/2019  7:00 AM       Patient was seen, examined and discussed with Dr. Lorinda Holter and the ICU team this morning. I agree with the history of present illness, past medical/surgical histories, family history, social history, medication list and allergies as listed. The review of systems is as noted above. My physical exam confirms the findings listed  Chart was reviewed including labs, CXR, CT scan, EKG and medical records confirm the findings noted     Hypertensive emergency - resolved. OK to transfer to the floor. Agree on the A/p noted above.

## 2019-06-12 NOTE — PROGRESS NOTES
Transfer Note    63M, PMH of  HTN, NSTEMI (2017) sarcoidosis, spinal cord injury and spinal stenosis p/w HA and photophobia. Dx: HTN Emergency (low concern for infection). CTH and trop neg. Tx: nicardipine gtt (IV labetalol and hydralazine didn't work on GMF). The patient was seen and examined. Vitals:    06/12/19 0120   BP: (!) 204/126   Pulse:    Resp:    Temp:    SpO2:      Scheduled Meds:   lisinopril  40 mg Oral Daily    atorvastatin  40 mg Oral Nightly    sodium chloride flush  10 mL Intravenous 2 times per day    enoxaparin  40 mg Subcutaneous Daily     Continuous Infusions:  PRN Meds:labetalol, cyclobenzaprine, pantoprazole, sodium chloride flush, ondansetron, senna, oxyCODONE-acetaminophen, acetaminophen, hydrALAZINE    General: Well developed, obese male llaying in some discomfort. HEENT: Atraumatic, normocephalic. Pupillary light reflex intact. Fundoscopic exam with no cotton wool spots, retinal hemorrhages visualized. Unable to visualize optic disc, no AV nicking seen. Possible copper-wiring of occasional blood vessels seen. Ear canals erythematous bilaterally with no exudate. No pain on pulling of the tragus. TM's clear bilaterally with no effusions or exudate. Cardiovascular: Regular rate and rhythm. No murmurs, rubs or gallops. No carotid bruits auscultated. 2+ dorsalis pedis, and radial pulses. Pulmonary: Clear to auscultation bilaterally   Abdomen: Soft, distended. Bowel sounds present bilaterally. Endorses tenderness deep palpation over LUQ without rebound or guarding, . No palpable hepatosplenomegaly. Extremities: No deformities, cyanosis or clubbing. 2+ pitting edema to mid calf bilaterally. Compression stockings in place bilaterally. Neuro: Alert and oriented. CN II-XII intact. Sensation to bilateral upper and lower extremities intact. Upper extremity strength intact bilaterally proximally and distally 5/5. Ankle dorsiflexion and plantar flexion 5/5 bilaterally.  Hip flexion

## 2019-06-12 NOTE — ED NOTES
Home Med List is complete. Source of medications in list is patient interview and recent refill history.     Patient states he took lisinopril 40 mg and tylenol 500 mg today.      Please note:  1. Carvedilol 12.5 mg, Lisinopril 5 mg -- both have been removed from Home Med List, pt now takes lisinopril 40 mg QD  2. Atorvastatin 40 mg -- pt states he only takes this twice weekly, states he is trying to change his diet to eat healthier instead  3.  Protonix 40 mg -- pt takes as needed, was previously in [address] List as daily     [address] intern   6/11/2019 8:47 PM

## 2019-06-12 NOTE — PROGRESS NOTES
Patient admitted to room 4515 from PCU. Patient A&Ox4, pupils round and reactive, and able to follow commands. Patient transferred to the ICU bed, connected to the ICU monitor and chlorhexidine bath complete. Blanchable redness noted to the coccyx and bilateral heels. , Nicardipine drip started at this time per MD orders and patient tolerated well. Patient complaining of nausea. PRN Zofran administered at this time, will continue to monitor.

## 2019-06-12 NOTE — H&P
Internal Medicine  PGY 1  History & Physical      CC Headache, elevated blood pressure    History Obtained From:  patient    HISTORY OF PRESENT ILLNESS:  Mr. Gianfranco Berumen is a 60 yo M with a PMH of HTN, sarcoidosis, spinal stenosis and spinal cord injury and history of NSTEMI that presents with headache and elevated blood pressure.      He reports that his headache started this morning and came on over about an hour. It is located mostly over his forehead with radiation to his neck. He rates the pain as 8/10 the entire day. He cannot think of any precipitating factors for this headache. He has been eating and drinking normally. He has tried his home percocet which did not relieve the pain. He also tried laying down which did not relieve the pain.     He endorses associated blurry vision, and lightheadedness and palpitations. He denies syncope. He reports that he takes 40mg of lisinopril daily and is compliant, taking his medication this morning. He takes his blood pressure almost every day in the morning and it is usually 120-130/80's. He took his blood pressure earlier and the systolic was around 068 which is when he called the ambulance. He denies chest pain, shortness of breath.      He denies fevers or chills. He denies sonophobia and photophobia. He endorses some mild nausea during the ambulance drive, but no vomiting. He denies any new weakness, numbness or tingling. ED Course  Came in with /148 and received 2 doses of labetalol 10mg IV. BP declined to 180/113. ED then gave morphine and benadryl. Head CT normal. EKG with Right bundle.        Past Medical History:        Diagnosis Date    DJD (degenerative joint disease)     HYPERCHOLESTERAEMIA     Hypertension     Sarcoidosis     Spinal cord injuries      FALL    NSTEMI in 2017 treated medically     Past Surgical History:        Procedure Laterality Date    BACK SURGERY      COLONOSCOPY  12/24/2018    COLONOSCOPY POLYPECTOMY SNARE/COLD [facility]OPSY [facility]    OTHER SURGICAL HISTORY  [facility]/01/2018    [facility]    UPPER GASTROINTESTINAL ENDOSCOPY  12/24/2018    COLONOSCOPY SUBMUCOSAL/BOTOX INJECTION performed by Katheryn Milton MD at [place] [address]   ·     Medications Prior to Admission:    Medications Prior to Admission: pantoprazole (PROTONIX) 40 MG tablet, Take 40 mg by mouth daily as needed (before breakfast)  cyclobenzaprine (FLEXERIL) 10 MG tablet, Take 10 mg by mouth 3 times daily as needed for Muscle spasms  oxyCODONE-acetaminophen (PERCOCET)  MG per tablet, Take 1 tablet by mouth every 4 hours as needed for Pain. atorvastatin (LIPITOR) 40 MG tablet, Take 1 tablet by mouth nightly  lisinopril (PRINIVIL;ZESTRIL) 40 MG tablet, Take 40 mg by mouth daily     Allergies:  Baclofen and Robaxin [methocarbamol]    Social History:   · TOBACCO:   reports that he quit smoking about 4 years ago. He has never used smokeless tobacco.  · ETOH:   reports that he does not drink alcohol. · DRUGS : Cocaine use 15 years ago. Reports none since that time  · Patient currently lives alone  ·   Family History:   · History reviewed. No pertinent family history. Review of Systems   Constitutional: Negative for chills, fatigue and fever. HENT: Negative for hearing loss. Eyes: Positive for visual disturbance. Negative for pain and itching. Respiratory: Negative for cough, shortness of breath, wheezing and stridor. Cardiovascular: Positive for palpitations and leg swelling (chronic, unchanged). Negative for chest pain. Gastrointestinal: Negative for abdominal pain, blood in stool, constipation, diarrhea, nausea and vomiting. Endocrine: Positive for polyuria. Genitourinary: Negative for difficulty urinating, frequency, hematuria and urgency. Musculoskeletal: Positive for neck pain (chronic). Negative for myalgias.    Skin: Negative for rash and CREATININE 0.9   GLUCOSE 135*     LFT's:   Recent Labs     06/11/19 1835   AST 24   ALT 22   BILITOT 0.4   ALKPHOS 101     Troponin:   Recent Labs     06/11/19 1835   TROPONINI <0.01     U/A:  Recent Labs     06/11/19 1835   COLORU Yellow   PHUR 7.5   CLARITYU Clear   SPECGRAV 1.010   LEUKOCYTESUR Negative   UROBILINOGEN 0.2   BILIRUBINUR Negative   BLOODU Negative   GLUCOSEU Negative       CT Head WO Contrast   Final Result      1. No acute intracranial process. XR CHEST STANDARD (2 VW)   Final Result      Mild pulmonary vascular congestion. No acute pulmonary consolidation. ASSESSMENT AND PLAN:  Mr. Lili Bradley is a 60 yo M with a PMH of HTN, sarcoidosis, spinal stenosis and spinal cord injury and history of NSTEMI that presents with headache and elevated blood pressure. Acute:   Hypertensive Emergency BP of 231/148 w/ headache and blurry vision. Severe headache, unresolved despite morphine, dilaudid and home percocet. Received IV labetalol 10 mg x 2 in the ED, lisinopril 40 mg. History of cocaine use--he reports last used 15 years ago, but tested positive for cocaine in 2017.   - IV labetalol and hydralazine PRN for goal systolic BP of 016-793 (approx 25% decrease from admission).    - Lisinopril 40 mg daily   -History of cocaine use--he reports last used 15 years ago, but tested positive for cocaine in 2017.   - Neurovascular checks every 4 hours  - Attempt pain control with tylenol at this time without further opiates--> will resume home dosage tomorrow  - Follow up UDS     Chronic:   HLD  - Continue atorvastatin     Sarcoidosis  - Not on medications   - Diagnosed in 2002 involving R lung      History of Spinal Cord Injury with chronic lower extremity weakness and back pain   - May resume home percocet tomorrow AM  - Continue cyclobenzaprine  - Uses a walker at baseline     Will discuss with attending physician Dr. Shanita Easton Status:Full code  FEN: General  PPX: SCD's, resume

## 2019-06-12 NOTE — PROGRESS NOTES
Patient complaining of a headache rating at an 8/10. Administered a one time dose of Toradol per MD orders and patient tolerated well. Will continue to monitor.

## 2019-06-12 NOTE — H&P
Internal Medicine  PGY   History & Physical      CC : Headache, elevated blood pressure    History Obtained From:  patient    HISTORY OF PRESENT ILLNESS:    Mr. Dennis Godfrey is a 60 yo M with a PMH of HTN, sarcoidosis, spinal stenosis and spinal cord injury and history of NSTEMI. He reports that his headache started this morning and came on over about an hour. It is located mostly over his forehead with radiation to his neck. He rates the pain as 8/10 the entire day. He cannot think of any precipitating factors for this headache. He has been eating and drinking normally. He has tried his home percocet which did not relieve the pain. He also tried laying down which did not relieve the pain. He endorses associated blurry vision, and lightheadedness and palpitations. He denies syncope. He reports that he takes 40mg of lisinopril daily and is compliant. He takes his blood pressure almost every day in the morning and it is usually 120-130/80's. He took his blood pressure earlier and the systolic was around 499 which is when he called the ambulance. He denies chest pain, shortness of breath     He denies fevers or chills. He denies sonophobia and photophobia. He endorses some mild nausea during the ambulance drive, but no vomiting. He denies any new weakness, numbness or tingling. ED Course  Came in with /148 and received 2 doses of labetalol 10mg IV. BP declined to 180/113. ED then gave morphine and benadryl. Head CT normal. EKG with Right bundle.      Past Medical History:        Diagnosis Date    DJD (degenerative joint disease)     HYPERCHOLESTERAEMIA     Hypertension     Sarcoidosis     Spinal cord injuries      FALL      NSTEMI in 2017 treated medically     Past Surgical History:        Procedure Laterality Date    BACK SURGERY      COLONOSCOPY  12/24/2018    COLONOSCOPY POLYPECTOMY SNARE/COLD BIOPSY performed by Rodrigo Sanchez MD at [address] [place] [address] REPAIR      inguinal    OTHER SURGICAL HISTORY  05/01/2018    [facility]    UPPER GASTROINTESTINAL ENDOSCOPY  12/24/2018    COLONOSCOPY SUBMUCOSAL/BOTOX INJECTION performed by Terrell Galindo MD at [facility] ENDOSCOPY       Medications Priorto Admission:    Medications Prior to Admission: pantoprazole (PROTONIX) 40 MG tablet, Take 40 mg by mouth daily as needed (before breakfast)  cyclobenzaprine (FLEXERIL) 10 MG tablet, Take 10 mg by mouth 3 times daily as needed for Muscle spasms  oxyCODONE-acetaminophen (PERCOCET)  MG per tablet, Take 1 tablet by mouth every 4 hours as needed for Pain. atorvastatin (LIPITOR) 40 MG tablet, Take 1 tablet by mouth nightly  lisinopril (PRINIVIL;ZESTRIL) 40 MG tablet, Take 40 mg by mouth daily     Allergies:  Baclofen and Robaxin [methocarbamol]    Social History:   · TOBACCO:   reports that he quit smoking about 4 years ago. He has never used smokeless tobacco.  · ETOH:   reports that he does not drink alcohol. · DRUGS : Cocaine use 15 years ago. Reports none since that time  · Patient currently lives alone  ·   Family History:   History reviewed. No pertinent family history. ROS: A 10 point review of systems was conducted, significant findings as noted in HPI. Physical exam:      General: Well developed, obese male who appears older than his stated age, laying in some discomfort. HEENT: Atraumatic, normocephalic. Pupillary light reflex intact. Fundoscopic exam with no papilledema, cotton wool spots or retinal hemorrhages visualized. Ear canals erythematous bilaterally with no exudate. No pain on pulling of the tragus. TM's clear bilaterally with no effusions or exudate. Cardiovascular: Regular rate and rhythm. No murmurs, rubs or gallops. No carotid bruits auscultated. 2+ dorsalis pedis, and radial pulses. Pulmonary: Clear to auscultation bilaterally   Abdomen: Soft, distended. Bowel sounds present bilaterally.  Tender to deep palpation over LUQ without rebound 2017.   - Neurovascular checks every 4 hours  - Attempt pain control with tylenol at this time  - Follow up UDS    HLD  - Continue atorvastatin    Sarcoidosis  - Not on medications   - Diagnosed in 2002 involving R lung     History of Spinal Cord Injury with chronic lower extremity weakness and back pain   - May resume home percocet tomorrow AM  - Continue cyclobenzaprine  - Uses a walker at baseline         Will discuss with attending physician Dr.    Code Status:Full code  FEN: Regular diet  PPX: SCD's until BP resolves  DISPO: IP

## 2019-06-12 NOTE — PROGRESS NOTES
Transfer note - from ICU to Medical Floor    Briefly Brett Ventura is a 59year old male with history of hypertension, NSTEMI (2017), sarcoidosis, spinal cord injury and spinal stenosis who presented with headache and photophobia. Patient admitted for hypertensive emergency requiring nicardipine gtt. Home oral lisinopril resumed and norvasc added. BP improved and patient weaned off nicardipine gtt. Patient was seen and examined at bedside. Currently patient states that HA is much improved. He denies fever, chills, nausea, vomiting, vision changes, muscle weakness, bowel or bladder changes. Vitals:    06/12/19 0900   BP: 109/79   Pulse: 85   Resp: 18   Temp:    SpO2: 96%       Physical Exam  Constitutional: He is oriented to person, place, and time. No distress. HENT:   Head: Normocephalic and atraumatic. Eyes: Conjunctivae and EOM are normal.   Neck: Normal range of motion. Neck supple. Cardiovascular: Normal rate and normal heart sounds. Pulmonary/Chest: Effort normal and breath sounds normal.   Abdominal: Soft. Bowel sounds are normal.   Musculoskeletal: He exhibits edema. He exhibits no deformity. Neurological: He is alert and oriented to person, place, and time. Skin: Skin is warm and dry. He is not diaphoretic.        Intake/Output Summary (Last 24 hours) at 6/12/2019 0932  Last data filed at 6/12/2019 1364  Gross per 24 hour   Intake 300.85 ml   Output 1125 ml   Net -824.15 ml     Lab Results   Component Value Date    CREATININE 0.9 06/12/2019    BUN 8 06/12/2019     06/12/2019    K 4.1 06/12/2019     06/12/2019    CO2 24 06/12/2019     Lab Results   Component Value Date    WBC 7.4 06/12/2019    HGB 13.0 (L) 06/12/2019    HCT 41.8 06/12/2019    MCV 74.8 (L) 06/12/2019     06/12/2019          Scheduled Meds:   lisinopril  40 mg Oral Daily    atorvastatin  40 mg Oral Nightly    sodium chloride flush  10 mL Intravenous 2 times per day    enoxaparin  40 mg Subcutaneous Daily     Continuous Infusions:   niCARdipine Stopped (06/12/19 0628)     PRN Meds:hydrALAZINE, labetalol, cyclobenzaprine, pantoprazole, sodium chloride flush, ondansetron, senna, oxyCODONE-acetaminophen, acetaminophen      ASSESSMENT/PLAN:    HTN emergency  /148 in ED with headache & blurry vision. Weaned off nicardipine gtt this AM.   Cont home lisinopril 40 mg daily. Add norvasc 2.5 mg this evening if BP elevated. PRN IV labetalol. Consider spot dose of lasix if concern for volume overload. Chronic back pain with opioid dependence   H/o spinal cord injury with residual LE weakness and chronic back pain. Continue home cyclobenzaprine  Continue home percocet   Bowel regimen added. The objective and subjective findings as well as the ICU course of treatment have been reviewed with the ICU team. The treatment plan has been reviewed with the ICU team. The patient is being transferred to [place] [address] in stable condition. Fab Connor MD.  Internal Medicine, PGY-2  Contact via [employer]    I have personally seen and evaluated this patient.  I discussed findings and management with the resident, on 06/12/19  and agree as documented in this note     Janiya Dumas [facility]  Attending Physician

## 2019-06-12 NOTE — PLAN OF CARE
Problem: Pain:  Goal: Pain level will decrease  Description  Pain level will decrease  Outcome: Ongoing  Note:   Pt being medicated as ordered for pain, pt has chronic back pain that he takes flexeril and percocet at home. Pt getting flexeril and percocet as needed for pain. Pt states pain is better after pain medication given. Will continue to monitor. Problem: Falls - Risk of:  Goal: Will remain free from falls  Description  Will remain free from falls  Note:   Pt at risk for falls, see Melanie assessment. Pt's bed in lowest position, call light in reach and encouraged to use for assistance, hourly rounding, side rails up times 3. Pt uses walker at home and did use here when getting up to chair. Pt has lower extremity weakness. Pt has been free of falls. Will continue to monitor.

## 2019-06-13 VITALS
BODY MASS INDEX: 36.17 KG/M2 | HEART RATE: 87 BPM | SYSTOLIC BLOOD PRESSURE: 153 MMHG | TEMPERATURE: 97.3 F | DIASTOLIC BLOOD PRESSURE: 102 MMHG | HEIGHT: 71 IN | RESPIRATION RATE: 24 BRPM | WEIGHT: 258.38 LBS | OXYGEN SATURATION: 98 %

## 2019-06-13 LAB
ANION GAP SERPL CALCULATED.3IONS-SCNC: 12 MMOL/L (ref 3–16)
BUN BLDV-MCNC: 18 MG/DL (ref 7–20)
CALCIUM SERPL-MCNC: 9.6 MG/DL (ref 8.3–10.6)
CHLORIDE BLD-SCNC: 101 MMOL/L (ref 99–110)
CO2: 26 MMOL/L (ref 21–32)
CREAT SERPL-MCNC: 1.2 MG/DL (ref 0.8–1.3)
GFR AFRICAN AMERICAN: >60
GFR NON-AFRICAN AMERICAN: >60
GLUCOSE BLD-MCNC: 97 MG/DL (ref 70–99)
POTASSIUM REFLEX MAGNESIUM: 4.3 MMOL/L (ref 3.5–5.1)
REASON FOR REJECTION: NORMAL
REJECTED TEST: NORMAL
SODIUM BLD-SCNC: 139 MMOL/L (ref 136–145)
TSH REFLEX: 1.54 UIU/ML (ref 0.27–4.2)

## 2019-06-13 PROCEDURE — 6370000000 HC RX 637 (ALT 250 FOR IP): Performed by: STUDENT IN AN ORGANIZED HEALTH CARE EDUCATION/TRAINING PROGRAM

## 2019-06-13 PROCEDURE — 84443 ASSAY THYROID STIM HORMONE: CPT

## 2019-06-13 PROCEDURE — 6360000002 HC RX W HCPCS: Performed by: STUDENT IN AN ORGANIZED HEALTH CARE EDUCATION/TRAINING PROGRAM

## 2019-06-13 PROCEDURE — 97530 THERAPEUTIC ACTIVITIES: CPT

## 2019-06-13 PROCEDURE — 2580000003 HC RX 258: Performed by: STUDENT IN AN ORGANIZED HEALTH CARE EDUCATION/TRAINING PROGRAM

## 2019-06-13 PROCEDURE — 36415 COLL VENOUS BLD VENIPUNCTURE: CPT

## 2019-06-13 PROCEDURE — 51798 US URINE CAPACITY MEASURE: CPT

## 2019-06-13 PROCEDURE — 97162 PT EVAL MOD COMPLEX 30 MIN: CPT

## 2019-06-13 PROCEDURE — 97116 GAIT TRAINING THERAPY: CPT

## 2019-06-13 PROCEDURE — 80048 BASIC METABOLIC PNL TOTAL CA: CPT

## 2019-06-13 RX ORDER — LISINOPRIL 40 MG/1
40 TABLET ORAL DAILY
Status: DISCONTINUED | OUTPATIENT
Start: 2019-06-13 | End: 2019-06-13 | Stop reason: HOSPADM

## 2019-06-13 RX ORDER — AMLODIPINE BESYLATE 5 MG/1
5 TABLET ORAL DAILY
Status: DISCONTINUED | OUTPATIENT
Start: 2019-06-13 | End: 2019-06-13 | Stop reason: HOSPADM

## 2019-06-13 RX ORDER — OXYCODONE AND ACETAMINOPHEN 10; 325 MG/1; MG/1
1 TABLET ORAL EVERY 4 HOURS PRN
Qty: 12 TABLET | Refills: 0 | Status: SHIPPED | OUTPATIENT
Start: 2019-06-13 | End: 2019-06-16

## 2019-06-13 RX ORDER — FUROSEMIDE 20 MG/1
20 TABLET ORAL DAILY PRN
Qty: 30 TABLET | Refills: 0 | Status: SHIPPED | OUTPATIENT
Start: 2019-06-13 | End: 2021-04-15 | Stop reason: CLARIF

## 2019-06-13 RX ORDER — AMLODIPINE BESYLATE 5 MG/1
5 TABLET ORAL DAILY
Qty: 30 TABLET | Refills: 0 | Status: ON HOLD | OUTPATIENT
Start: 2019-06-14 | End: 2020-05-28 | Stop reason: HOSPADM

## 2019-06-13 RX ADMIN — OXYCODONE AND ACETAMINOPHEN 1 TABLET: 10; 325 TABLET ORAL at 05:45

## 2019-06-13 RX ADMIN — AMLODIPINE BESYLATE 5 MG: 5 TABLET ORAL at 11:11

## 2019-06-13 RX ADMIN — OXYCODONE AND ACETAMINOPHEN 1 TABLET: 10; 325 TABLET ORAL at 13:39

## 2019-06-13 RX ADMIN — CYCLOBENZAPRINE HYDROCHLORIDE 10 MG: 10 TABLET, FILM COATED ORAL at 09:44

## 2019-06-13 RX ADMIN — LISINOPRIL 40 MG: 40 TABLET ORAL at 08:50

## 2019-06-13 RX ADMIN — CYCLOBENZAPRINE HYDROCHLORIDE 10 MG: 10 TABLET, FILM COATED ORAL at 13:39

## 2019-06-13 RX ADMIN — Medication 10 ML: at 09:04

## 2019-06-13 RX ADMIN — OXYCODONE AND ACETAMINOPHEN 1 TABLET: 10; 325 TABLET ORAL at 01:47

## 2019-06-13 RX ADMIN — OXYCODONE AND ACETAMINOPHEN 1 TABLET: 10; 325 TABLET ORAL at 09:44

## 2019-06-13 ASSESSMENT — PAIN SCALES - GENERAL
PAINLEVEL_OUTOF10: 7
PAINLEVEL_OUTOF10: 3
PAINLEVEL_OUTOF10: 8
PAINLEVEL_OUTOF10: 3
PAINLEVEL_OUTOF10: 6

## 2019-06-13 ASSESSMENT — PAIN DESCRIPTION - PROGRESSION
CLINICAL_PROGRESSION: GRADUALLY WORSENING
CLINICAL_PROGRESSION: NOT CHANGED
CLINICAL_PROGRESSION: GRADUALLY WORSENING

## 2019-06-13 ASSESSMENT — PAIN DESCRIPTION - PAIN TYPE
TYPE: CHRONIC PAIN

## 2019-06-13 ASSESSMENT — PAIN DESCRIPTION - FREQUENCY
FREQUENCY: CONTINUOUS

## 2019-06-13 ASSESSMENT — PAIN DESCRIPTION - ONSET
ONSET: ON-GOING

## 2019-06-13 ASSESSMENT — PAIN - FUNCTIONAL ASSESSMENT
PAIN_FUNCTIONAL_ASSESSMENT: PREVENTS OR INTERFERES SOME ACTIVE ACTIVITIES AND ADLS

## 2019-06-13 ASSESSMENT — PAIN DESCRIPTION - LOCATION
LOCATION: BACK

## 2019-06-13 ASSESSMENT — PAIN DESCRIPTION - ORIENTATION
ORIENTATION: LOWER

## 2019-06-13 ASSESSMENT — PAIN DESCRIPTION - DESCRIPTORS
DESCRIPTORS: SORE;SPASM
DESCRIPTORS: SPASM;SORE
DESCRIPTORS: SPASM;SORE

## 2019-06-13 NOTE — PROGRESS NOTES
Patient discharged home with brother. Verbalizes understanding of discharge instructions.  Belongings with the patient

## 2019-06-13 NOTE — DISCHARGE SUMMARY
is oriented to person, place, and time. He appears well-nourished. No distress. HENT:   Head: Atraumatic. Right Ear: External ear normal.   Left Ear: External ear normal.   Eyes: Conjunctivae and EOM are normal. Right eye exhibits no discharge. Left eye exhibits no discharge. No scleral icterus. Neck: Neck supple. No JVD present. No tracheal deviation present. Cardiovascular: Normal rate, regular rhythm and normal heart sounds. Exam reveals no friction rub. No murmur heard. Pulmonary/Chest: Effort normal and breath sounds normal. No stridor. No respiratory distress. He has no wheezes. He has no rales. Abdominal: Soft. Bowel sounds are normal. He exhibits distension. He exhibits no mass. There is no tenderness. There is no rebound and no guarding. Musculoskeletal: He exhibits edema (R UE). He exhibits no tenderness or deformity. Neurological: He is alert and oriented to person, place, and time. Skin: Skin is warm and dry. Capillary refill takes less than 2 seconds. No rash noted. He is not diaphoretic. No erythema. No pallor. Psychiatric: He has a normal mood and affect. His behavior is normal. Judgment and thought content normal.     Consults: none  Significant Diagnostic Studies:  none  Treatments: nicardipine gtt then PO lisinopril, amlodipine  Disposition: home  Discharged Condition: Stable  Follow Up: Primary Care Physician in one week    DISCHARGE MEDICATION:     Medication List      START taking these medications    amLODIPine 5 MG tablet  Commonly known as:  NORVASC  Take 1 tablet by mouth daily  Start taking on:  6/14/2019     furosemide 20 MG tablet  Commonly known as:  LASIX  Take 1 tablet by mouth daily as needed (Take one tablet if body weight increase from baseline by 3-5 lbs.)        CHANGE how you take these medications    * oxyCODONE-acetaminophen  MG per tablet  Commonly known as:  PERCOCET  Take 1 tablet by mouth every 4 hours as needed for Pain for up to 3 days.   What changed: You were already taking a medication with the same name, and this prescription was added. Make sure you understand how and when to take each. * oxyCODONE-acetaminophen  MG per tablet  Commonly known as:  PERCOCET  What changed:  Another medication with the same name was added. Make sure you understand how and when to take each. * This list has 2 medication(s) that are the same as other medications prescribed for you. Read the directions carefully, and ask your doctor or other care provider to review them with you. CONTINUE taking these medications    atorvastatin 40 MG tablet  Commonly known as:  LIPITOR  Take 1 tablet by mouth nightly     cyclobenzaprine 10 MG tablet  Commonly known as:  FLEXERIL     lisinopril 40 MG tablet  Commonly known as:  PRINIVIL;ZESTRIL     pantoprazole 40 MG tablet  Commonly known as:  PROTONIX           Where to Get Your Medications      You can get these medications from any pharmacy    Bring a paper prescription for each of these medications  · amLODIPine 5 MG tablet  · furosemide 20 MG tablet  · oxyCODONE-acetaminophen  MG per tablet       Activity: activity as tolerated  Diet: regular diet  Wound Care: none needed    Signed:  Len Shannno MD   PGY1  6/13/2019     I have personally seen and evaluated this patient. I discussed findings and management with the resident, on 06/13/19  and agree as documented in this note     My time spent reviewing discharge plan and follow ups with resident, along with performing independent review of discharge medicines along with counseling the patient exceeds 30 minutes.     [facility]  Attending Physician

## 2019-06-13 NOTE — PROGRESS NOTES
PGY-1 Resident Progress Note  [facility]    Admit Date: 6/11/2019       CC: Headache, elevated blood pressure    Overnight Events:  No acute events overnight. Pt states he is doing well and does not have any HA or photophobia. Pt denies F/C/N/V, fatigue, CP, dyspnea, abdominal pain, constipation/diarrhea, and urinary symptoms. Family Hx: History reviewed. No pertinent family history. Scheduled Medications:    lisinopril  40 mg Oral Daily    sennosides-docusate sodium  2 tablet Oral Daily    atorvastatin  40 mg Oral Nightly    sodium chloride flush  10 mL Intravenous 2 times per day    enoxaparin  40 mg Subcutaneous Daily      PRN Medications: labetalol, cyclobenzaprine, pantoprazole, sodium chloride flush, ondansetron, senna, oxyCODONE-acetaminophen, acetaminophen  Diet: DIET GENERAL;    Continuous Infusions:    PHYSICAL EXAM:  BP (!) 155/105   Pulse 74   Temp 97.6 °F (36.4 °C) (Oral)   Resp 18   Ht 5' 11\" (1.803 m)   Wt 258 lb 6.1 oz (117.2 kg)   SpO2 91%   BMI 36.04 kg/m²   No results for input(s): POCGLU in the last 72 hours. Intake/Output Summary (Last 24 hours) at 6/13/2019 0908  Last data filed at 6/13/2019 7706  Gross per 24 hour   Intake 480 ml   Output 225 ml   Net 255 ml       Physical Exam   Constitutional: He is oriented to person, place, and time. He appears well-nourished. No distress. HENT:   Head: Atraumatic. Right Ear: External ear normal.   Left Ear: External ear normal.   Eyes: Conjunctivae and EOM are normal. Right eye exhibits no discharge. Left eye exhibits no discharge. No scleral icterus. Neck: Neck supple. No JVD present. No tracheal deviation present. Cardiovascular: Normal rate, regular rhythm and normal heart sounds. Exam reveals no friction rub. No murmur heard. Pulmonary/Chest: Effort normal and breath sounds normal. No stridor. No respiratory distress. He has no wheezes. He has no rales. Abdominal: Soft.  Bowel sounds are normal. He exhibits daily  - received amlodipine yesterday x1; will add 5mg daily  - can give spot dosing of lasix for volume overload and dc on PRN lasix  - IV labetalol PRN for SBP > 160   - UDS (+) for opiates; on chronic pain ctl for back pain     HARRY  Cre on admission 0.9 and uptrended to 1.2 today. Likely 2/2 to hypovolemia (prerenal) in the setting of poor fluid intake. - will initially get bladder scan and monitor strict I/O    Chronic back pain with opioid dependence   H/o spinal cord injury with residual LE weakness and chronic back pain. - continue home percocet   - continue home cyclobenzaprine   - continue bowel regimen     HLD  - continue atorvastatin       Sarcoidosis  Diagnosed in 2002 involving R lung. Not on medications. Code Status: Full Code  FEN: IVF: none; DIET GENERAL;  PPX: Lovenox   DISPO: GMF      This patient will be discussed with attending, Amber Morales MD.    Analilia Hood M.D.    Internal Medicine Resident, PGY-1  Contact via [employer]  6/13/2019, 9:08 AM

## 2019-06-13 NOTE — PROGRESS NOTES
CLINICAL PHARMACY NOTE: MEDS TO [address] Select Patient?: No  Total # of Prescriptions Filled: 3   The following medications were delivered to the patient:  · Percocet  · Amlodipine  · Furosemide  Total # of Interventions Completed: 0  Time Spent (min): 45    Additional Documentation:
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
## 2019-06-13 NOTE — FLOWSHEET NOTE
06/13/19 1431   Encounter Summary   Services provided to: Patient   Referral/Consult From: Rounding   Continue Visiting   (Seen 6/13/19, RADHA.)   Complexity of Encounter Moderate   Length of Encounter 15 minutes   Routine   Type Initial   Assessment Approachable   Intervention Nurtured hope   Outcome Expressed gratitude

## 2019-06-13 NOTE — CARE COORDINATION
Case Management Admit Note / Discharge Assessment    2019  [facility] Case Management Department    Patient: Luis Rod  MRN: [id number] / : 1955  ACCT: [de-identified]          Admission Documentation  Attending Provider: Yousif Bonilla MD  Admit date/time: 2019  6:03 PM  Status: Inpatient [101]  Diagnosis: Hypertensive emergency     Readmission within last 30 days:  no     Living Situation  Discharge Planning  Living Arrangements: Family Members  Support Systems: Family Members  Potential Assistance Needed: N/A  Type of Home Care Services: [address]  Patient expects to be discharged to[de-identified] Home  Expected Discharge Date: 19    Service Assessment:       Values / Beliefs  Do you have any ethnic, cultural, sacramental, or spiritual Gnosticist needs you would like us to be aware of while you are in the hospital?: No    Advance Directives (For Healthcare)  Healthcare Directive: Yes, patient has an advance directive for healthcare treatment           Pharmacy: [address] [place] [address] Medications:  No  Does patient want to participate in local refill/meds to beds program?: Yes    Notification completed in HENS/PAS? No     IMM  No       Discharge disposition: Kerry Trevino has arranged. Called and left message with Primary Care Provider, Ephraim Robles NP ([phone]  [phone]). Need home care resumption orders. Will fax AVS with any medications needed when she calls back. Patient reports he has a shower chair, stair lift, W/C, walker, and scooter at home. Brother with provide transportation home. Jez Hardy and his family were provided with choice of provider; he and his family are in agreement with the discharge plan.       Care Transitions patient: No    Timothy Padilla, RN, BSN  [facility] [employer]  Case Management Department  Ph:   [phone]

## 2019-10-27 ENCOUNTER — APPOINTMENT (OUTPATIENT)
Dept: GENERAL RADIOLOGY | Age: 64
End: 2019-10-27
Payer: OTHER GOVERNMENT

## 2019-10-27 ENCOUNTER — HOSPITAL ENCOUNTER (EMERGENCY)
Age: 64
Discharge: HOME | End: 2019-10-27
Attending: EMERGENCY MEDICINE
Payer: OTHER GOVERNMENT

## 2019-10-27 VITALS
TEMPERATURE: 98.8 F | SYSTOLIC BLOOD PRESSURE: 118 MMHG | DIASTOLIC BLOOD PRESSURE: 82 MMHG | RESPIRATION RATE: 14 BRPM | OXYGEN SATURATION: 99 % | HEART RATE: 76 BPM
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
## 2019-10-27 DIAGNOSIS — S46.912A STRAIN OF LEFT SHOULDER, INITIAL ENCOUNTER: ICD-10-CM

## 2019-10-27 PROCEDURE — 73502 X-RAY EXAM HIP UNI 2-3 VIEWS: CPT

## 2019-10-27 PROCEDURE — 99284 EMERGENCY DEPT VISIT MOD MDM: CPT

## 2019-10-27 PROCEDURE — 73030 X-RAY EXAM OF SHOULDER: CPT

## 2019-10-27 PROCEDURE — 71045 X-RAY EXAM CHEST 1 VIEW: CPT

## 2019-10-27 PROCEDURE — 6370000000 HC RX 637 (ALT 250 FOR IP): Performed by: EMERGENCY MEDICINE

## 2019-10-27 RX ORDER — OXYCODONE HYDROCHLORIDE 5 MG/1
10 TABLET ORAL ONCE
Status: COMPLETED | OUTPATIENT
Start: 2019-10-27 | End: 2019-10-27

## 2019-10-27 RX ORDER — LIDOCAINE 4 G/G
1 PATCH TOPICAL DAILY
Status: DISCONTINUED | OUTPATIENT
Start: 2019-10-27 | End: 2019-10-27

## 2019-10-27 RX ORDER — LIDOCAINE 4 G/G
1 PATCH TOPICAL ONCE
Status: DISCONTINUED | OUTPATIENT
Start: 2019-10-27 | End: 2019-10-27 | Stop reason: HOSPADM

## 2019-10-27 RX ADMIN — OXYCODONE HYDROCHLORIDE 10 MG: 5 TABLET ORAL at 01:07

## 2019-10-27 ASSESSMENT — PAIN DESCRIPTION - PAIN TYPE: TYPE: ACUTE PAIN

## 2019-10-27 ASSESSMENT — PAIN DESCRIPTION - LOCATION: LOCATION: SHOULDER;HIP

## 2019-10-27 ASSESSMENT — PAIN DESCRIPTION - ORIENTATION: ORIENTATION: LEFT

## 2019-10-27 ASSESSMENT — PAIN SCALES - GENERAL
PAINLEVEL_OUTOF10: 9
PAINLEVEL_OUTOF10: 9

## 2019-10-28 ASSESSMENT — ENCOUNTER SYMPTOMS
ABDOMINAL PAIN: 0
NAUSEA: 0
DIARRHEA: 0
EYE PAIN: 0
SHORTNESS OF BREATH: 0
COUGH: 0
VOMITING: 0
WHEEZING: 0

## 2020-05-25 ENCOUNTER — APPOINTMENT (OUTPATIENT)
Dept: CT IMAGING | Age: 65
End: 2020-05-25
Payer: OTHER GOVERNMENT

## 2020-05-25 ENCOUNTER — HOSPITAL ENCOUNTER (OUTPATIENT)
Age: 65
Setting detail: OBSERVATION
Discharge: HOME | End: 2020-05-28
Attending: EMERGENCY MEDICINE | Admitting: INTERNAL MEDICINE
Payer: OTHER GOVERNMENT

## 2020-05-25 ENCOUNTER — APPOINTMENT (OUTPATIENT)
Dept: GENERAL RADIOLOGY | Age: 65
End: 2020-05-25
Payer: OTHER GOVERNMENT

## 2020-05-25 LAB
A/G RATIO: 1.2 (ref 1.1–2.2)
ALBUMIN SERPL-MCNC: 4.6 G/DL (ref 3.4–5)
ALP BLD-CCNC: 92 U/L (ref 40–129)
ALT SERPL-CCNC: 22 U/L (ref 10–40)
ANION GAP SERPL CALCULATED.3IONS-SCNC: 12 MMOL/L (ref 3–16)
AST SERPL-CCNC: 26 U/L (ref 15–37)
BASOPHILS ABSOLUTE: 0.1 K/UL (ref 0–0.2)
BASOPHILS RELATIVE PERCENT: 0.9 %
BILIRUB SERPL-MCNC: 0.3 MG/DL (ref 0–1)
BUN BLDV-MCNC: 16 MG/DL (ref 7–20)
CALCIUM SERPL-MCNC: 10.2 MG/DL (ref 8.3–10.6)
CHLORIDE BLD-SCNC: 100 MMOL/L (ref 99–110)
CO2: 23 MMOL/L (ref 21–32)
CREAT SERPL-MCNC: 1.1 MG/DL (ref 0.8–1.3)
EOSINOPHILS ABSOLUTE: 0.1 K/UL (ref 0–0.6)
EOSINOPHILS RELATIVE PERCENT: 0.9 %
GFR AFRICAN AMERICAN: >60
GFR NON-AFRICAN AMERICAN: >60
GLOBULIN: 3.8 G/DL
GLUCOSE BLD-MCNC: 121 MG/DL (ref 70–99)
HCT VFR BLD CALC: 47.8 % (ref 40.5–52.5)
HEMOGLOBIN: 15.3 G/DL (ref 13.5–17.5)
INR BLD: 1.03 (ref 0.86–1.14)
LYMPHOCYTES ABSOLUTE: 0.9 K/UL (ref 1–5.1)
LYMPHOCYTES RELATIVE PERCENT: 14 %
MCH RBC QN AUTO: 26.2 PG (ref 26–34)
MCHC RBC AUTO-ENTMCNC: 32 G/DL (ref 31–36)
MCV RBC AUTO: 81.8 FL (ref 80–100)
MONOCYTES ABSOLUTE: 0.7 K/UL (ref 0–1.3)
MONOCYTES RELATIVE PERCENT: 10.2 %
NEUTROPHILS ABSOLUTE: 5 K/UL (ref 1.7–7.7)
NEUTROPHILS RELATIVE PERCENT: 74 %
PDW BLD-RTO: 15.4 % (ref 12.4–15.4)
PLATELET # BLD: 234 K/UL (ref 135–450)
PMV BLD AUTO: 7.8 FL (ref 5–10.5)
POTASSIUM REFLEX MAGNESIUM: 4.1 MMOL/L (ref 3.5–5.1)
PROTHROMBIN TIME: 12 SEC (ref 10–13.2)
RBC # BLD: 5.85 M/UL (ref 4.2–5.9)
SODIUM BLD-SCNC: 135 MMOL/L (ref 136–145)
TOTAL PROTEIN: 8.4 G/DL (ref 6.4–8.2)
TROPONIN: <0.01 NG/ML
WBC # BLD: 6.7 K/UL (ref 4–11)

## 2020-05-25 PROCEDURE — 2500000003 HC RX 250 WO HCPCS: Performed by: EMERGENCY MEDICINE

## 2020-05-25 PROCEDURE — 2580000003 HC RX 258: Performed by: EMERGENCY MEDICINE

## 2020-05-25 PROCEDURE — 96375 TX/PRO/DX INJ NEW DRUG ADDON: CPT

## 2020-05-25 PROCEDURE — 70450 CT HEAD/BRAIN W/O DYE: CPT

## 2020-05-25 PROCEDURE — 6360000002 HC RX W HCPCS: Performed by: EMERGENCY MEDICINE

## 2020-05-25 PROCEDURE — 80053 COMPREHEN METABOLIC PANEL: CPT

## 2020-05-25 PROCEDURE — 93005 ELECTROCARDIOGRAM TRACING: CPT | Performed by: EMERGENCY MEDICINE

## 2020-05-25 PROCEDURE — 85025 COMPLETE CBC W/AUTO DIFF WBC: CPT

## 2020-05-25 PROCEDURE — 84484 ASSAY OF TROPONIN QUANT: CPT

## 2020-05-25 PROCEDURE — 6370000000 HC RX 637 (ALT 250 FOR IP): Performed by: EMERGENCY MEDICINE

## 2020-05-25 PROCEDURE — 85610 PROTHROMBIN TIME: CPT

## 2020-05-25 PROCEDURE — 99285 EMERGENCY DEPT VISIT HI MDM: CPT

## 2020-05-25 PROCEDURE — 71045 X-RAY EXAM CHEST 1 VIEW: CPT

## 2020-05-25 PROCEDURE — 36415 COLL VENOUS BLD VENIPUNCTURE: CPT

## 2020-05-25 RX ORDER — PROPARACAINE HYDROCHLORIDE 5 MG/ML
1 SOLUTION/ DROPS OPHTHALMIC ONCE
Status: COMPLETED | OUTPATIENT
Start: 2020-05-25 | End: 2020-05-25

## 2020-05-25 RX ORDER — ONDANSETRON 2 MG/ML
INJECTION INTRAMUSCULAR; INTRAVENOUS
Status: DISPENSED
Start: 2020-05-25 | End: 2020-05-26

## 2020-05-25 RX ORDER — 0.9 % SODIUM CHLORIDE 0.9 %
1000 INTRAVENOUS SOLUTION INTRAVENOUS ONCE
Status: COMPLETED | OUTPATIENT
Start: 2020-05-25 | End: 2020-05-26

## 2020-05-25 RX ORDER — METOCLOPRAMIDE HYDROCHLORIDE 5 MG/ML
10 INJECTION INTRAMUSCULAR; INTRAVENOUS ONCE
Status: COMPLETED | OUTPATIENT
Start: 2020-05-25 | End: 2020-05-25

## 2020-05-25 RX ORDER — DEXAMETHASONE SODIUM PHOSPHATE 10 MG/ML
10 INJECTION, SOLUTION INTRAMUSCULAR; INTRAVENOUS ONCE
Status: COMPLETED | OUTPATIENT
Start: 2020-05-25 | End: 2020-05-25

## 2020-05-25 RX ORDER — DIPHENHYDRAMINE HYDROCHLORIDE 50 MG/ML
25 INJECTION INTRAMUSCULAR; INTRAVENOUS ONCE
Status: COMPLETED | OUTPATIENT
Start: 2020-05-25 | End: 2020-05-25

## 2020-05-25 RX ADMIN — FLUORESCEIN SODIUM 1 MG: 1 STRIP OPHTHALMIC at 23:09

## 2020-05-25 RX ADMIN — METOCLOPRAMIDE HYDROCHLORIDE 10 MG: 5 INJECTION INTRAMUSCULAR; INTRAVENOUS at 23:09

## 2020-05-25 RX ADMIN — DIPHENHYDRAMINE HYDROCHLORIDE 25 MG: 50 INJECTION, SOLUTION INTRAMUSCULAR; INTRAVENOUS at 23:09

## 2020-05-25 RX ADMIN — SODIUM CHLORIDE 1000 ML: 9 INJECTION, SOLUTION INTRAVENOUS at 23:09

## 2020-05-25 RX ADMIN — DEXAMETHASONE SODIUM PHOSPHATE 10 MG: 10 INJECTION, SOLUTION INTRAMUSCULAR; INTRAVENOUS at 23:09

## 2020-05-25 RX ADMIN — PROPARACAINE HYDROCHLORIDE 1 DROP: 5 SOLUTION/ DROPS OPHTHALMIC at 23:09

## 2020-05-25 ASSESSMENT — PAIN DESCRIPTION - LOCATION: LOCATION: HEAD

## 2020-05-25 ASSESSMENT — PAIN SCALES - GENERAL: PAINLEVEL_OUTOF10: 10

## 2020-05-25 ASSESSMENT — PAIN DESCRIPTION - PAIN TYPE: TYPE: ACUTE PAIN

## 2020-05-26 ENCOUNTER — APPOINTMENT (OUTPATIENT)
Dept: CT IMAGING | Age: 65
End: 2020-05-26
Payer: OTHER GOVERNMENT

## 2020-05-26 PROBLEM — R51.9 HEADACHE: Status: ACTIVE | Noted: 2020-05-26

## 2020-05-26 LAB
EKG ATRIAL RATE: 78 BPM
EKG DIAGNOSIS: NORMAL
EKG P AXIS: 40 DEGREES
EKG P-R INTERVAL: 198 MS
EKG Q-T INTERVAL: 422 MS
EKG QRS DURATION: 148 MS
EKG QTC CALCULATION (BAZETT): 481 MS
EKG R AXIS: -39 DEGREES
EKG T AXIS: 23 DEGREES
EKG VENTRICULAR RATE: 78 BPM
SEDIMENTATION RATE, ERYTHROCYTE: 24 MM/HR (ref 0–20)

## 2020-05-26 PROCEDURE — G0378 HOSPITAL OBSERVATION PER HR: HCPCS

## 2020-05-26 PROCEDURE — 96375 TX/PRO/DX INJ NEW DRUG ADDON: CPT

## 2020-05-26 PROCEDURE — 6370000000 HC RX 637 (ALT 250 FOR IP): Performed by: EMERGENCY MEDICINE

## 2020-05-26 PROCEDURE — 99219 PR INITIAL OBSERVATION CARE/DAY 50 MINUTES: CPT | Performed by: PSYCHIATRY & NEUROLOGY

## 2020-05-26 PROCEDURE — 86140 C-REACTIVE PROTEIN: CPT

## 2020-05-26 PROCEDURE — 96365 THER/PROPH/DIAG IV INF INIT: CPT

## 2020-05-26 PROCEDURE — 6360000002 HC RX W HCPCS: Performed by: EMERGENCY MEDICINE

## 2020-05-26 PROCEDURE — 6370000000 HC RX 637 (ALT 250 FOR IP): Performed by: INTERNAL MEDICINE

## 2020-05-26 PROCEDURE — 2580000003 HC RX 258: Performed by: EMERGENCY MEDICINE

## 2020-05-26 PROCEDURE — 6360000002 HC RX W HCPCS: Performed by: INTERNAL MEDICINE

## 2020-05-26 PROCEDURE — 96376 TX/PRO/DX INJ SAME DRUG ADON: CPT

## 2020-05-26 PROCEDURE — 70496 CT ANGIOGRAPHY HEAD: CPT

## 2020-05-26 PROCEDURE — 6360000004 HC RX CONTRAST MEDICATION: Performed by: EMERGENCY MEDICINE

## 2020-05-26 PROCEDURE — 2500000003 HC RX 250 WO HCPCS: Performed by: EMERGENCY MEDICINE

## 2020-05-26 PROCEDURE — 6370000000 HC RX 637 (ALT 250 FOR IP)

## 2020-05-26 PROCEDURE — 93010 ELECTROCARDIOGRAM REPORT: CPT | Performed by: INTERNAL MEDICINE

## 2020-05-26 PROCEDURE — 2580000003 HC RX 258: Performed by: INTERNAL MEDICINE

## 2020-05-26 PROCEDURE — 96372 THER/PROPH/DIAG INJ SC/IM: CPT

## 2020-05-26 PROCEDURE — 96367 TX/PROPH/DG ADDL SEQ IV INF: CPT

## 2020-05-26 PROCEDURE — 85652 RBC SED RATE AUTOMATED: CPT

## 2020-05-26 RX ORDER — AMLODIPINE BESYLATE 5 MG/1
TABLET ORAL
Status: COMPLETED
Start: 2020-05-26 | End: 2020-05-26

## 2020-05-26 RX ORDER — PANTOPRAZOLE SODIUM 40 MG/1
TABLET, DELAYED RELEASE ORAL
Status: COMPLETED
Start: 2020-05-26 | End: 2020-05-26

## 2020-05-26 RX ORDER — FLUOCINOLONE ACETONIDE 0.1 MG/ML
SOLUTION TOPICAL
COMMUNITY
End: 2021-04-15 | Stop reason: CLARIF

## 2020-05-26 RX ORDER — PANTOPRAZOLE SODIUM 40 MG/1
40 TABLET, DELAYED RELEASE ORAL DAILY PRN
Status: DISCONTINUED | OUTPATIENT
Start: 2020-05-26 | End: 2020-05-28 | Stop reason: HOSPADM

## 2020-05-26 RX ORDER — CYCLOBENZAPRINE HCL 10 MG
10 TABLET ORAL 3 TIMES DAILY PRN
Status: DISCONTINUED | OUTPATIENT
Start: 2020-05-26 | End: 2020-05-28 | Stop reason: HOSPADM

## 2020-05-26 RX ORDER — SELENIUM SULFIDE 2.5 MG/100ML
LOTION TOPICAL
COMMUNITY
End: 2021-04-15 | Stop reason: CLARIF

## 2020-05-26 RX ORDER — SODIUM CHLORIDE 0.9 % (FLUSH) 0.9 %
10 SYRINGE (ML) INJECTION EVERY 12 HOURS SCHEDULED
Status: DISCONTINUED | OUTPATIENT
Start: 2020-05-26 | End: 2020-05-28 | Stop reason: HOSPADM

## 2020-05-26 RX ORDER — LISINOPRIL 40 MG/1
TABLET ORAL
COMMUNITY
End: 2021-04-15 | Stop reason: CLARIF

## 2020-05-26 RX ORDER — MAGNESIUM SULFATE IN WATER 40 MG/ML
2 INJECTION, SOLUTION INTRAVENOUS ONCE
Status: COMPLETED | OUTPATIENT
Start: 2020-05-26 | End: 2020-05-26

## 2020-05-26 RX ORDER — ACETAMINOPHEN 325 MG/1
650 TABLET ORAL EVERY 6 HOURS PRN
Status: DISCONTINUED | OUTPATIENT
Start: 2020-05-26 | End: 2020-05-28 | Stop reason: HOSPADM

## 2020-05-26 RX ORDER — ALBUTEROL SULFATE 90 UG/1
2 AEROSOL, METERED RESPIRATORY (INHALATION) EVERY 6 HOURS PRN
COMMUNITY

## 2020-05-26 RX ORDER — SENNA PLUS 8.6 MG/1
1 TABLET ORAL 2 TIMES DAILY
COMMUNITY
Start: 2017-01-05 | End: 2021-04-15 | Stop reason: CLARIF

## 2020-05-26 RX ORDER — ACETAMINOPHEN 650 MG/1
650 SUPPOSITORY RECTAL EVERY 6 HOURS PRN
Status: DISCONTINUED | OUTPATIENT
Start: 2020-05-26 | End: 2020-05-28 | Stop reason: HOSPADM

## 2020-05-26 RX ORDER — BUTALBITAL, ACETAMINOPHEN AND CAFFEINE 50; 325; 40 MG/1; MG/1; MG/1
2 TABLET ORAL ONCE
Status: COMPLETED | OUTPATIENT
Start: 2020-05-26 | End: 2020-05-26

## 2020-05-26 RX ORDER — POLYETHYLENE GLYCOL 3350 17 G/17G
17 POWDER, FOR SOLUTION ORAL DAILY PRN
Status: DISCONTINUED | OUTPATIENT
Start: 2020-05-26 | End: 2020-05-28 | Stop reason: HOSPADM

## 2020-05-26 RX ORDER — MAGNESIUM SULFATE IN WATER 40 MG/ML
2 INJECTION, SOLUTION INTRAVENOUS PRN
Status: DISCONTINUED | OUTPATIENT
Start: 2020-05-26 | End: 2020-05-28 | Stop reason: HOSPADM

## 2020-05-26 RX ORDER — FLUOCINONIDE 0.5 MG/G
OINTMENT TOPICAL
COMMUNITY
End: 2021-04-15 | Stop reason: CLARIF

## 2020-05-26 RX ORDER — POTASSIUM CHLORIDE 7.45 MG/ML
10 INJECTION INTRAVENOUS PRN
Status: DISCONTINUED | OUTPATIENT
Start: 2020-05-26 | End: 2020-05-28 | Stop reason: HOSPADM

## 2020-05-26 RX ORDER — POLYETHYLENE GLYCOL 3350 17 G/17G
17 POWDER, FOR SOLUTION ORAL 2 TIMES DAILY
COMMUNITY
Start: 2017-01-05 | End: 2021-04-15 | Stop reason: CLARIF

## 2020-05-26 RX ORDER — PROMETHAZINE HYDROCHLORIDE 25 MG/1
12.5 TABLET ORAL EVERY 6 HOURS PRN
Status: DISCONTINUED | OUTPATIENT
Start: 2020-05-26 | End: 2020-05-28 | Stop reason: HOSPADM

## 2020-05-26 RX ORDER — ONDANSETRON 2 MG/ML
4 INJECTION INTRAMUSCULAR; INTRAVENOUS EVERY 6 HOURS PRN
Status: DISCONTINUED | OUTPATIENT
Start: 2020-05-26 | End: 2020-05-28 | Stop reason: HOSPADM

## 2020-05-26 RX ORDER — LORAZEPAM 2 MG/ML
0.5 INJECTION INTRAMUSCULAR ONCE
Status: COMPLETED | OUTPATIENT
Start: 2020-05-26 | End: 2020-05-26

## 2020-05-26 RX ORDER — KETOROLAC TROMETHAMINE 30 MG/ML
15 INJECTION, SOLUTION INTRAMUSCULAR; INTRAVENOUS ONCE
Status: COMPLETED | OUTPATIENT
Start: 2020-05-26 | End: 2020-05-26

## 2020-05-26 RX ORDER — ACETAMINOPHEN 500 MG
1000 TABLET ORAL EVERY 6 HOURS PRN
Status: ON HOLD | COMMUNITY
End: 2021-04-26 | Stop reason: HOSPADM

## 2020-05-26 RX ORDER — LISINOPRIL 20 MG/1
TABLET ORAL
Status: COMPLETED
Start: 2020-05-26 | End: 2020-05-26

## 2020-05-26 RX ORDER — SODIUM CHLORIDE 0.9 % (FLUSH) 0.9 %
10 SYRINGE (ML) INJECTION PRN
Status: DISCONTINUED | OUTPATIENT
Start: 2020-05-26 | End: 2020-05-28 | Stop reason: HOSPADM

## 2020-05-26 RX ORDER — ATORVASTATIN CALCIUM 40 MG/1
40 TABLET, FILM COATED ORAL NIGHTLY
Status: DISCONTINUED | OUTPATIENT
Start: 2020-05-26 | End: 2020-05-28 | Stop reason: HOSPADM

## 2020-05-26 RX ORDER — OXYCODONE AND ACETAMINOPHEN 10; 325 MG/1; MG/1
1 TABLET ORAL EVERY 4 HOURS PRN
Status: DISCONTINUED | OUTPATIENT
Start: 2020-05-26 | End: 2020-05-28 | Stop reason: HOSPADM

## 2020-05-26 RX ORDER — CYCLOBENZAPRINE HCL 10 MG
TABLET ORAL
Status: ON HOLD | COMMUNITY
End: 2020-05-28 | Stop reason: HOSPADM

## 2020-05-26 RX ORDER — ASPIRIN 81 MG
100 TABLET, DELAYED RELEASE (ENTERIC COATED) ORAL PRN
COMMUNITY
End: 2021-04-15 | Stop reason: CLARIF

## 2020-05-26 RX ORDER — KETOCONAZOLE 20 MG/ML
SHAMPOO TOPICAL
COMMUNITY
End: 2021-04-15 | Stop reason: CLARIF

## 2020-05-26 RX ORDER — FUROSEMIDE 20 MG/1
20 TABLET ORAL DAILY PRN
Status: DISCONTINUED | OUTPATIENT
Start: 2020-05-26 | End: 2020-05-28 | Stop reason: HOSPADM

## 2020-05-26 RX ORDER — AMOXICILLIN AND CLAVULANATE POTASSIUM 875; 125 MG/1; MG/1
1 TABLET, FILM COATED ORAL ONCE
Status: COMPLETED | OUTPATIENT
Start: 2020-05-26 | End: 2020-05-26

## 2020-05-26 RX ORDER — TACROLIMUS 0.3 MG/G
OINTMENT TOPICAL
COMMUNITY
End: 2021-04-15 | Stop reason: CLARIF

## 2020-05-26 RX ORDER — ASPIRIN 81 MG/1
81 TABLET ORAL DAILY
COMMUNITY
End: 2021-07-28

## 2020-05-26 RX ORDER — FUROSEMIDE 20 MG/1
TABLET ORAL
Status: COMPLETED
Start: 2020-05-26 | End: 2020-05-26

## 2020-05-26 RX ORDER — SILDENAFIL 100 MG/1
TABLET, FILM COATED ORAL
COMMUNITY
End: 2021-04-15 | Stop reason: CLARIF

## 2020-05-26 RX ORDER — AMLODIPINE BESYLATE 5 MG/1
10 TABLET ORAL DAILY
Status: DISCONTINUED | OUTPATIENT
Start: 2020-05-26 | End: 2020-05-28 | Stop reason: HOSPADM

## 2020-05-26 RX ORDER — ERYTHROMYCIN 5 MG/G
OINTMENT OPHTHALMIC ONCE
Status: COMPLETED | OUTPATIENT
Start: 2020-05-26 | End: 2020-05-26

## 2020-05-26 RX ORDER — LISINOPRIL 20 MG/1
40 TABLET ORAL DAILY
Status: DISCONTINUED | OUTPATIENT
Start: 2020-05-26 | End: 2020-05-28 | Stop reason: HOSPADM

## 2020-05-26 RX ORDER — ERYTHROMYCIN 5 MG/G
OINTMENT OPHTHALMIC EVERY 8 HOURS SCHEDULED
Status: DISCONTINUED | OUTPATIENT
Start: 2020-05-26 | End: 2020-05-28 | Stop reason: HOSPADM

## 2020-05-26 RX ORDER — HYDROMORPHONE HYDROCHLORIDE 1 MG/ML
1 INJECTION, SOLUTION INTRAMUSCULAR; INTRAVENOUS; SUBCUTANEOUS ONCE
Status: COMPLETED | OUTPATIENT
Start: 2020-05-26 | End: 2020-05-26

## 2020-05-26 RX ORDER — KETOROLAC TROMETHAMINE 30 MG/ML
30 INJECTION, SOLUTION INTRAMUSCULAR; INTRAVENOUS EVERY 6 HOURS PRN
Status: DISCONTINUED | OUTPATIENT
Start: 2020-05-26 | End: 2020-05-28 | Stop reason: HOSPADM

## 2020-05-26 RX ORDER — OXYCODONE AND ACETAMINOPHEN 10; 325 MG/1; MG/1
TABLET ORAL
Status: COMPLETED
Start: 2020-05-26 | End: 2020-05-26

## 2020-05-26 RX ORDER — CHLORTHALIDONE 25 MG/1
25 TABLET ORAL DAILY
Status: ON HOLD | COMMUNITY
End: 2021-04-17 | Stop reason: SDUPTHER

## 2020-05-26 RX ORDER — NICOTINE POLACRILEX 4 MG/1
GUM, CHEWING ORAL
COMMUNITY
End: 2021-04-15 | Stop reason: CLARIF

## 2020-05-26 RX ORDER — BISACODYL 10 MG
SUPPOSITORY, RECTAL RECTAL
COMMUNITY
End: 2021-04-15 | Stop reason: CLARIF

## 2020-05-26 RX ADMIN — KETOROLAC TROMETHAMINE 30 MG: 30 INJECTION, SOLUTION INTRAMUSCULAR at 10:56

## 2020-05-26 RX ADMIN — AMOXICILLIN AND CLAVULANATE POTASSIUM 1 TABLET: 875; 125 TABLET, FILM COATED ORAL at 00:15

## 2020-05-26 RX ADMIN — OXYCODONE AND ACETAMINOPHEN 1 TABLET: 325; 10 TABLET ORAL at 08:31

## 2020-05-26 RX ADMIN — KETOROLAC TROMETHAMINE 30 MG: 30 INJECTION, SOLUTION INTRAMUSCULAR at 19:27

## 2020-05-26 RX ADMIN — PANTOPRAZOLE SODIUM 40 MG: 40 TABLET, DELAYED RELEASE ORAL at 08:31

## 2020-05-26 RX ADMIN — LORAZEPAM 0.5 MG: 2 INJECTION INTRAMUSCULAR; INTRAVENOUS at 00:15

## 2020-05-26 RX ADMIN — ERYTHROMYCIN: 5 OINTMENT OPHTHALMIC at 14:52

## 2020-05-26 RX ADMIN — IOPAMIDOL 75 ML: 755 INJECTION, SOLUTION INTRAVENOUS at 02:35

## 2020-05-26 RX ADMIN — DESMOPRESSIN ACETATE 40 MG: 0.2 TABLET ORAL at 20:48

## 2020-05-26 RX ADMIN — FUROSEMIDE 20 MG: 20 TABLET ORAL at 08:30

## 2020-05-26 RX ADMIN — ERYTHROMYCIN: 5 OINTMENT OPHTHALMIC at 08:02

## 2020-05-26 RX ADMIN — AMLODIPINE BESYLATE 10 MG: 5 TABLET ORAL at 08:32

## 2020-05-26 RX ADMIN — Medication 10 ML: at 20:48

## 2020-05-26 RX ADMIN — OXYCODONE AND ACETAMINOPHEN 1 TABLET: 325; 10 TABLET ORAL at 21:39

## 2020-05-26 RX ADMIN — ERYTHROMYCIN: 5 OINTMENT OPHTHALMIC at 00:15

## 2020-05-26 RX ADMIN — Medication 10 ML: at 19:28

## 2020-05-26 RX ADMIN — KETOROLAC TROMETHAMINE 15 MG: 30 INJECTION, SOLUTION INTRAMUSCULAR at 00:14

## 2020-05-26 RX ADMIN — LISINOPRIL 40 MG: 20 TABLET ORAL at 08:32

## 2020-05-26 RX ADMIN — HYDROMORPHONE HYDROCHLORIDE 1 MG: 1 INJECTION, SOLUTION INTRAMUSCULAR; INTRAVENOUS; SUBCUTANEOUS at 04:45

## 2020-05-26 RX ADMIN — OXYCODONE AND ACETAMINOPHEN 1 TABLET: 325; 10 TABLET ORAL at 17:31

## 2020-05-26 RX ADMIN — BUTALBITAL, ACETAMINOPHEN, AND CAFFEINE 2 TABLET: 50; 325; 40 TABLET ORAL at 01:33

## 2020-05-26 RX ADMIN — OXYCODONE AND ACETAMINOPHEN 1 TABLET: 325; 10 TABLET ORAL at 12:57

## 2020-05-26 RX ADMIN — Medication 10 ML: at 10:57

## 2020-05-26 RX ADMIN — ERYTHROMYCIN: 5 OINTMENT OPHTHALMIC at 20:48

## 2020-05-26 RX ADMIN — ENOXAPARIN SODIUM 40 MG: 40 INJECTION SUBCUTANEOUS at 08:32

## 2020-05-26 RX ADMIN — MAGNESIUM SULFATE HEPTAHYDRATE 2 G: 40 INJECTION, SOLUTION INTRAVENOUS at 01:33

## 2020-05-26 RX ADMIN — Medication 10 ML: at 08:33

## 2020-05-26 RX ADMIN — VALPROATE SODIUM 1000 MG: 100 INJECTION, SOLUTION INTRAVENOUS at 03:43

## 2020-05-26 ASSESSMENT — PAIN DESCRIPTION - ONSET
ONSET_2: ON-GOING
ONSET: ON-GOING
ONSET_2: ON-GOING
ONSET_2: PROGRESSIVE
ONSET_2: ON-GOING
ONSET: ON-GOING
ONSET_2: PROGRESSIVE
ONSET_2: ON-GOING

## 2020-05-26 ASSESSMENT — PAIN DESCRIPTION - PAIN TYPE
TYPE_2: CHRONIC PAIN
TYPE: ACUTE PAIN
TYPE_2: CHRONIC PAIN
TYPE_2: CHRONIC PAIN
TYPE_2: ACUTE PAIN;CHRONIC PAIN
TYPE: ACUTE PAIN
TYPE_2: CHRONIC PAIN
TYPE_2: CHRONIC PAIN;ACUTE PAIN
TYPE_2: ACUTE PAIN
TYPE_2: CHRONIC PAIN

## 2020-05-26 ASSESSMENT — PAIN SCALES - GENERAL
PAINLEVEL_OUTOF10: 8
PAINLEVEL_OUTOF10: 7
PAINLEVEL_OUTOF10: 8
PAINLEVEL_OUTOF10: 8
PAINLEVEL_OUTOF10: 6
PAINLEVEL_OUTOF10: 8
PAINLEVEL_OUTOF10: 5
PAINLEVEL_OUTOF10: 6
PAINLEVEL_OUTOF10: 10
PAINLEVEL_OUTOF10: 8
PAINLEVEL_OUTOF10: 5
PAINLEVEL_OUTOF10: 7
PAINLEVEL_OUTOF10: 10
PAINLEVEL_OUTOF10: 6
PAINLEVEL_OUTOF10: 4
PAINLEVEL_OUTOF10: 8
PAINLEVEL_OUTOF10: 6
PAINLEVEL_OUTOF10: 4
PAINLEVEL_OUTOF10: 10
PAINLEVEL_OUTOF10: 10
PAINLEVEL_OUTOF10: 8

## 2020-05-26 ASSESSMENT — PAIN DESCRIPTION - DESCRIPTORS
DESCRIPTORS: ACHING;THROBBING
DESCRIPTORS_2: CRAMPING
DESCRIPTORS_2: ACHING;CRAMPING
DESCRIPTORS: ACHING
DESCRIPTORS_2: ACHING;CRAMPING
DESCRIPTORS_2: CRAMPING
DESCRIPTORS_2: ACHING;CRAMPING
DESCRIPTORS: HEADACHE
DESCRIPTORS: ACHING;THROBBING
DESCRIPTORS_2: ACHING;CRAMPING
DESCRIPTORS: ACHING;THROBBING
DESCRIPTORS_2: ACHING;CRAMPING
DESCRIPTORS: ACHING
DESCRIPTORS: ACHING
DESCRIPTORS_2: ACHING;CRAMPING

## 2020-05-26 ASSESSMENT — PAIN - FUNCTIONAL ASSESSMENT
PAIN_FUNCTIONAL_ASSESSMENT: PREVENTS OR INTERFERES WITH MANY ACTIVE NOT PASSIVE ACTIVITIES
PAIN_FUNCTIONAL_ASSESSMENT: PREVENTS OR INTERFERES WITH ALL ACTIVE AND SOME PASSIVE ACTIVITIES
PAIN_FUNCTIONAL_ASSESSMENT: PREVENTS OR INTERFERES WITH MANY ACTIVE NOT PASSIVE ACTIVITIES

## 2020-05-26 ASSESSMENT — PAIN DESCRIPTION - FREQUENCY
FREQUENCY: CONTINUOUS
FREQUENCY: INTERMITTENT
FREQUENCY: INTERMITTENT
FREQUENCY: CONTINUOUS
FREQUENCY: INTERMITTENT
FREQUENCY: INTERMITTENT

## 2020-05-26 ASSESSMENT — PAIN DESCRIPTION - PROGRESSION
CLINICAL_PROGRESSION: GRADUALLY IMPROVING
CLINICAL_PROGRESSION_2: GRADUALLY IMPROVING
CLINICAL_PROGRESSION_2: GRADUALLY IMPROVING
CLINICAL_PROGRESSION: GRADUALLY IMPROVING
CLINICAL_PROGRESSION_2: GRADUALLY WORSENING
CLINICAL_PROGRESSION_2: GRADUALLY WORSENING
CLINICAL_PROGRESSION: GRADUALLY WORSENING
CLINICAL_PROGRESSION: GRADUALLY IMPROVING
CLINICAL_PROGRESSION: NOT CHANGED
CLINICAL_PROGRESSION: RAPIDLY WORSENING
CLINICAL_PROGRESSION: GRADUALLY WORSENING
CLINICAL_PROGRESSION_2: GRADUALLY WORSENING
CLINICAL_PROGRESSION: GRADUALLY IMPROVING
CLINICAL_PROGRESSION_2: GRADUALLY WORSENING
CLINICAL_PROGRESSION: GRADUALLY WORSENING
CLINICAL_PROGRESSION: GRADUALLY WORSENING
CLINICAL_PROGRESSION: NOT CHANGED
CLINICAL_PROGRESSION_2: GRADUALLY IMPROVING
CLINICAL_PROGRESSION_2: GRADUALLY IMPROVING
CLINICAL_PROGRESSION_2: NOT CHANGED
CLINICAL_PROGRESSION: GRADUALLY IMPROVING
CLINICAL_PROGRESSION: NOT CHANGED
CLINICAL_PROGRESSION_2: GRADUALLY IMPROVING
CLINICAL_PROGRESSION_2: GRADUALLY WORSENING
CLINICAL_PROGRESSION: GRADUALLY IMPROVING
CLINICAL_PROGRESSION: GRADUALLY IMPROVING

## 2020-05-26 ASSESSMENT — PAIN DESCRIPTION - LOCATION
LOCATION_2: BACK
LOCATION: HEAD
LOCATION_2: BACK
LOCATION: HEAD
LOCATION: HEAD
LOCATION_2: BACK
LOCATION_2: BACK
LOCATION: HEAD
LOCATION: HEAD
LOCATION_2: BACK
LOCATION: HEAD
LOCATION_2: BACK
LOCATION_2: BACK
LOCATION: HEAD
LOCATION: HEAD
LOCATION_2: BACK
LOCATION: HEAD
LOCATION: HEAD

## 2020-05-26 ASSESSMENT — PAIN DESCRIPTION - DIRECTION
RADIATING_TOWARDS: LEFT EYE
RADIATING_TOWARDS: AROUND LEFT EYE
RADIATING_TOWARDS: LEFT EYE

## 2020-05-26 ASSESSMENT — PAIN DESCRIPTION - INTENSITY
RATING_2: 7
RATING_2: 7
RATING_2: 8
RATING_2: 8
RATING_2: 7
RATING_2: 8
RATING_2: 8
RATING_2: 7
RATING_2: 8
RATING_2: 8
RATING_2: 7
RATING_2: 8

## 2020-05-26 ASSESSMENT — PAIN DESCRIPTION - ORIENTATION
ORIENTATION_2: LOWER
ORIENTATION_2: LOWER
ORIENTATION: ANTERIOR
ORIENTATION_2: LOWER
ORIENTATION: ANTERIOR
ORIENTATION_2: LOWER
ORIENTATION: ANTERIOR
ORIENTATION_2: LOWER
ORIENTATION: LEFT
ORIENTATION: ANTERIOR
ORIENTATION: ANTERIOR
ORIENTATION_2: LOWER
ORIENTATION: ANTERIOR

## 2020-05-26 ASSESSMENT — PAIN DESCRIPTION - DURATION
DURATION_2: CONTINUOUS

## 2020-05-26 NOTE — ED NOTES
Patient is alert and oriented and able to answer questions appropriately. Skin is warm; diaphoretic. Swelling to bilateral legs and arms noted. Patient complains of headache and nausea. Abdomen is soft and round. Patient is on tele monitor with continuous pulse ox. Respirations easy and unlabored. Bed in lowest, locked position with side rails up x2. Call light in reach.       Dyan Dow RN  05/25/20 1743

## 2020-05-26 NOTE — ED PROVIDER NOTES
eMERGENCY dEPARTMENT eNCOUnter      PtName: Guero Dominguez  MRN: [id number]  [place] 1955  Date of evaluation: 5/25/2020  Provider: Zurdo Muir, [address] [place] [address]       Chief Complaint   Patient presents with    Headache     Patient [facility] ER [facility] with complaints of headache x12 hours. Hx of HTN. HISTORY OF PRESENT ILLNESS   (Location/Symptom, Timing/Onset,Context/Setting, Quality, Duration, Modifying Factors, Severity) Note limiting factors. HPI    Guero Dominguez is a 62 y.o. male who presents to the emergency department with chief complaint of a headache that started tonight as well as eye discomfort. Gradual in onset, 8/10, left frontal without radiation. No fever or neck stiffness. Reports some irritation of the left eye without any history of any foreign objects. No contacts or obvious vision changes. No fever. No head injuries. Nursing Notes were reviewed. REVIEW OF SYSTEMS    (2+ forlevel 4; 10+ for level 5)     Review of Systems  See hpi for further details. Complete 10 point review of all systems performed and is otherwise negative unless noted above.     PAST MEDICAL HISTORY     Past Medical History:   Diagnosis Date    DJD (degenerative joint disease)     HYPERCHOLESTERAEMIA     Hypertension     Sarcoidosis     Spinal cord injuries      FALL       SURGICAL HISTORY       Past Surgical History:   Procedure Laterality Date    BACK SURGERY      COLONOSCOPY  12/24/2018    COLONOSCOPY POLYPECTOMY SNARE/COLD BIOPSY performed by [facility] at [facility]    OTHER [facility]    UPPER GASTROINTESTINAL ENDOSCOPY  12/24/2018    COLONOSCOPY SUBMUCOSAL/BOTOX INJECTION performed by Imelda Rivas MD at [address]       Previous Medications    AMLODIPINE (NORVASC) 5 MG TABLET    Take 1 tablet by mouth daily ATORVASTATIN (LIPITOR) 40 MG TABLET    Take 1 tablet by mouth nightly    CYCLOBENZAPRINE (FLEXERIL) 10 MG TABLET    Take 10 mg by mouth 3 times daily as needed for Muscle spasms    FUROSEMIDE (LASIX) 20 MG TABLET    Take 1 tablet by mouth daily as needed (Take one tablet if body weight increase from baseline by 3-5 lbs.)    LISINOPRIL (PRINIVIL;ZESTRIL) 40 MG TABLET    Take 40 mg by mouth daily     OXYCODONE-ACETAMINOPHEN (PERCOCET)  MG PER TABLET    Take 1 tablet by mouth every 4 hours as needed for Pain. PANTOPRAZOLE (PROTONIX) 40 MG TABLET    Take 40 mg by mouth daily as needed (before breakfast)       ALLERGIES     Baclofen and Robaxin [methocarbamol]    FAMILY HISTORY     History reviewed. No pertinent family history.        SOCIAL HISTORY       Social History     Socioeconomic History    Marital status: Single     Spouse name: None    Number of children: None    Years of education: None    Highest education level: None   Occupational History    None   Social Needs    Financial resource strain: None    Food insecurity     Worry: None     Inability: None    Transportation needs     Medical: None     Non-medical: None   Tobacco Use    Smoking status: Former Smoker     Last attempt to quit: 2015     Years since quittin.4    Smokeless tobacco: Never Used    Tobacco comment: 3 cig   Substance and Sexual Activity    Alcohol use: No    Drug use: No    Sexual activity: None   Lifestyle    Physical activity     Days per week: None     Minutes per session: None    Stress: None   Relationships    Social connections     Talks on phone: None     Gets together: None     Attends Faith service: None     Active member of club or organization: None     Attends meetings of clubs or organizations: None     Relationship status: None    Intimate partner violence     Fear of current or ex partner: None     Emotionally abused: None     Physically abused: None     Forced sexual activity: None   Other Topics Concern    None   Social History Narrative    None       SCREENINGS           PHYSICAL EXAM    (5+ for level 4, 8+ for level 5)     ED Triage Vitals [05/25/20 2210]   BP Temp Temp Source Pulse Resp SpO2 Height Weight   (!) 211/132 97.5 °F (36.4 °C) Oral 77 18 98 % 5' 11\" (1.803 m) 258 lb (117 kg)       Physical Exam  Vitals signs and nursing note reviewed. Constitutional:       General: He is not in acute distress. Appearance: Normal appearance. He is well-developed. He is not ill-appearing, toxic-appearing or diaphoretic. HENT:      Head: Normocephalic and atraumatic. Right Ear: External ear normal.      Left Ear: External ear normal.      Nose: Nose normal.      Mouth/Throat:      Mouth: Mucous membranes are moist.      Pharynx: Oropharynx is clear. Eyes:      General: No scleral icterus. Right eye: No discharge. Left eye: No discharge. Pupils: Pupils are equal, round, and reactive to light. Comments: Left eye upper eyelid swelling. There is conjunctival injection without discharge. Full painless extraocular movements. No increased uptake on fluorescein stain of the left eye. Pressures are 19 in the right eye and 15 in the left eye. Neck:      Musculoskeletal: Normal range of motion and neck supple. Vascular: No JVD. Trachea: No tracheal deviation. Comments: Full active range of motion without discomfort. No meningismus  Cardiovascular:      Pulses: Normal pulses. Pulmonary:      Effort: Pulmonary effort is normal. No respiratory distress. Abdominal:      General: Abdomen is flat. There is no distension. Palpations: Abdomen is soft. Musculoskeletal: Normal range of motion. General: No swelling, tenderness, deformity or signs of injury. Right lower leg: No edema. Left lower leg: No edema. Skin:     General: Skin is warm and dry. Capillary Refill: Capillary refill takes less than 2 seconds.       Coloration: Skin is not jaundiced or pale. Findings: No bruising, erythema, lesion or rash. Neurological:      General: No focal deficit present. Mental Status: He is alert and oriented to person, place, and time. Cranial Nerves: No cranial nerve deficit. Sensory: No sensory deficit. Motor: No weakness or abnormal muscle tone. Coordination: Coordination normal.      Comments: Alert and oriented ×3. Pupils are equal and reactive to light. Visual fields are tested and intact. Cranial nerves II through XII are tested and intact. No upper extremity drift. No lower extremity drift. Sensation is intact to light touch ×4 extremities. Muscle strength testing is 5/5×4 extremities. Finger to nose testing is normal. Normal intelligence and speech. Psychiatric:         Mood and Affect: Mood normal.         Behavior: Behavior normal.         Thought Content: Thought content normal.         Judgment: Judgment normal.         DIAGNOSTIC RESULTS     EKG (Per Emergency Physician):   EKG interpretation by ED physician: Left axis deviation, sinus rhythm at 78 bpm with right bundle branch block. No obvious ischemic ST changes appreciated. RADIOLOGY (Per Emergency Physician): Interpretation per the Radiologist below, if available at the time of this note:  Ct Head Wo Contrast    Result Date: 5/25/2020  EXAMINATION: CT OF THE HEAD WITHOUT CONTRAST 5/25/2020 10:23 pm TECHNIQUE: CT of the head was performed without the administration of intravenous contrast. Dose modulation, iterative reconstruction, and/or weight based adjustment of the mA/kV was utilized to reduce the radiation dose to as low as reasonably achievable. COMPARISON: Head CT 06/11/2019 HISTORY: ORDERING SYSTEM PROVIDED HISTORY: HA, elevated BP TECHNOLOGIST PROVIDED HISTORY: Reason for exam:->HA, elevated BP Has a \"code stroke\" or \"stroke alert\" been called? ->No Reason for Exam: HA, elevated BP Acuity: Acute Type of Exam: Initial Relevant other components within normal limits    Narrative:     Performed at:  [facility]  [address]  [place], [address]   Phone [phone]   COMPREHENSIVE METABOLIC PANEL W/ REFLEX TO MG FOR LOW K - Abnormal; Notable for the following components:    Sodium 135 (*)     Glucose 121 (*)     Total Protein 8.4 (*)     All other components within normal limits    Narrative:     Performed at:  [facility]  [address]  [place], [address]   Phone [phone]   PROTIME-INR    Narrative:     Performed at:  [facility]  [address], [address]   Phone [phone]   TROPONIN    Narrative:     Performed at:  [facility]  [address]  [place], [address]   Phone [phone]       All other labs were within normal range or not returned as of this dictation.     EMERGENCY DEPARTMENT COURSE and DIFFERENTIAL DIAGNOSIS/MDM:   Vitals:    Vitals:    05/26/20 0145 05/26/20 0200 05/26/20 0215 05/26/20 0415   BP: (!) 173/109 (!) 162/102 (!) 169/111 (!) 168/103   Pulse: 87 91 91 92   Resp: 17 23 24 22   Temp:       TempSrc:       SpO2:    99%   Weight:       Height:           Medications   ondansetron (ZOFRAN) 4 MG/2ML injection (has no administration in time range)   HYDROmorphone HCl PF (DILAUDID) injection 1 mg (has no administration in time range)   proparacaine (ALCAINE) 0.5 % ophthalmic solution 1 drop (1 drop Left Eye Given 5/25/20 2309)   fluorescein ophthalmic strip 1 mg (1 mg Left Eye Given 5/25/20 2309)   metoclopramide (REGLAN) injection 10 mg (10 mg Intravenous Given 5/25/20 2309)   diphenhydrAMINE (BENADRYL) injection 25 mg (25 mg Intravenous Given 5/25/20 2309)   dexamethasone (PF) (DECADRON) injection 10 mg (10 mg Intravenous Given 5/25/20 2309)   0.9 % sodium chloride bolus (0 mLs Intravenous Stopped 5/26/20 0009)   LORazepam (ATIVAN) injection 0.5 mg (0.5 mg follow-up provider specified. DISCHARGE MEDICATIONS:  New Prescriptions    No medications on file          (Please note:  Portions of this note were completed with a voice recognition program. Efforts were made to edit the dictations but occasionally words and phrases are mis-transcribed.)    Form v2016. J.5-cn    Ange Baker DO (electronically signed)  Emergency Medicine Provider              Selam Alejandra,   05/26/20 305 [address]  05/26/20 0799

## 2020-05-26 NOTE — ED NOTES
Bed: 07  Expected date:   Expected time:   Means of arrival:   Comments:  Medic [id number] Cecile Denise [facility]  05/25/20 3342

## 2020-05-26 NOTE — CONSULTS
Inability: None    Transportation needs     Medical: None     Non-medical: None   Tobacco Use    Smoking status: Former Smoker     Last attempt to quit: 2015     Years since quittin.4    Smokeless tobacco: Never Used    Tobacco comment: 3 cig   Substance and Sexual Activity    Alcohol use: No    Drug use: No    Sexual activity: None   Lifestyle    Physical activity     Days per week: None     Minutes per session: None    Stress: None   Relationships    Social connections     Talks on phone: None     Gets together: None     Attends Voodoo service: None     Active member of club or organization: None     Attends meetings of clubs or organizations: None     Relationship status: None    Intimate partner violence     Fear of current or ex partner: None     Emotionally abused: None     Physically abused: None     Forced sexual activity: None   Other Topics Concern    None   Social History Narrative    None     Current Facility-Administered Medications   Medication Dose Route Frequency Provider Last Rate Last Dose    amLODIPine (NORVASC) tablet 10 mg  10 mg Oral Daily Lesvia Martino MD   10 mg at 20 7337    atorvastatin (LIPITOR) tablet 40 mg  40 mg Oral Nightly Margi Limon MD        cyclobenzaprine (FLEXERIL) tablet 10 mg  10 mg Oral TID PRN Lesvia Martino MD        furosemide (LASIX) tablet 20 mg  20 mg Oral Daily PRN Lesvia Martino MD   20 mg at 20 0830    lisinopril (PRINIVIL;ZESTRIL) tablet 40 mg  40 mg Oral Daily Lesvia Martino MD   40 mg at 20 0832    oxyCODONE-acetaminophen (PERCOCET)  MG per tablet 1 tablet  1 tablet Oral Q4H PRN Lesvia Martino MD   1 tablet at 20 1257    pantoprazole (PROTONIX) tablet 40 mg  40 mg Oral Daily PRN Lesvia Martino MD   40 mg at 20 0831    sodium chloride flush 0.9 % injection 10 mL  10 mL Intravenous 2 times per day Lesvia Martino MD   10 mL at 20 0833    sodium chloride flush 0.9 % injection 10 mL  10 mL Intravenous PRN Anabelle Gonsalves MD   10 mL at 05/26/20 1057    acetaminophen (TYLENOL) tablet 650 mg  650 mg Oral Q6H PRN Anabelle Gonsalves MD        Or   [facility] acetaminophen (TYLENOL) suppository 650 mg  650 mg Rectal Q6H PRN Margi Sharma MD        polyethylene glycol (GLYCOLAX) packet 17 g  17 g Oral Daily PRN Anabelle Gonsalves MD        promethazine (PHENERGAN) tablet 12.5 mg  12.5 mg Oral Q6H PRN Anabelle Gonsalves MD        Or    ondansetron (ZOFRAN) injection 4 mg  4 mg Intravenous Q6H PRN Margi Sharma MD        enoxaparin (LOVENOX) injection 40 mg  40 mg Subcutaneous Daily Anabelle Gonsalves MD   40 mg at 05/26/20 8210    potassium chloride 10 mEq/100 mL IVPB (Peripheral Line)  10 mEq Intravenous PRN Anabelle Gonsalves MD        magnesium sulfate 2 g in 50 mL IVPB premix  2 g Intravenous PRN Anabelle Gonsalves MD        ketorolac (TORADOL) injection 30 mg  30 mg Intravenous Q6H PRN Anabelle Gonsalves MD   30 mg at 05/26/20 1056    erythromycin [facility]) ophthalmic ointment   Left Eye 3 times per day Anabelle Gonsalves MD         Allergies   Allergen Reactions    Baclofen      Messes with my urine    Robaxin [Methocarbamol]      Irritates my stomach       PHYSICAL EXAMINATION:  BP (!) 142/91   Pulse 110   Temp 98.1 °F (36.7 °C) (Temporal)   Resp 14   Ht 5' 11\" (1.803 m)   Wt 251 lb 1.7 oz (113.9 kg)   SpO2 93%   BMI 35.02 kg/m²   Appearance: Well appearing, obese and in no distress  Mental Status Exam: Patient is alert, oriented to person, place and time. Recent and remote memory is normal  Fund of Knowledge is normal  Attention/concentration is normal.   Speech : No dysarthria  Language : No aphasia  Funduscopic Exam: Unable to get a good look at the fundus  Cranial Nerves:   II: Visual fields:  Full to confrontation  III: Pupils:  equal, round, reactive to light  III,IV,VI: Extra Ocular Movements are intact.  No nystagmus  V: Facial sensation is intact to pin prick and light touch  VII: Facial strength and movements: intact and symmetric smile,cheek puffing and eyebrow elevation  VIII: Hearing:  Intact to finger rub bilaterally  IX: Palate  elevation is symmetric  XI: Shoulder shrug is intact  XII: Tongue movements are normal  Motor:  Muscle tone and bulk are normal.   Strength is symmetrical 4+/5 in all four extremities. Sensory: Intact to light touch and  pin prick in all four extremities  Coordination:  Normal  Finger to Nose and Heel to Shin bilaterally    . Reflexes:  DTR 1 and symmetric bilaterally  Plantar response: Flexor bilaterally  Gait: Gait is normal  Romberg: negative  Vascular: No carotid bruit bilaterally        DATA:  LABS:  General Labs:    CBC:   Lab Results   Component Value Date    WBC 6.7 05/25/2020    RBC 5.85 05/25/2020    HGB 15.3 05/25/2020    HCT 47.8 05/25/2020    MCV 81.8 05/25/2020    MCH 26.2 05/25/2020    MCHC 32.0 05/25/2020    RDW 15.4 05/25/2020     05/25/2020    MPV 7.8 05/25/2020     BMP:    Lab Results   Component Value Date     05/25/2020    K 4.1 05/25/2020     05/25/2020    CO2 23 05/25/2020    BUN 16 05/25/2020    LABALBU 4.6 05/25/2020    CREATININE 1.1 05/25/2020    CALCIUM 10.2 05/25/2020    GFRAA >60 05/25/2020    GFRAA 75 02/20/2013    LABGLOM >60 05/25/2020    GLUCOSE 121 05/25/2020     RADIOLOGY REVIEW:  I have reviewed radiology image(s) and reports(s) of: CT head as well as CT angiogram    IMPRESSION :  Symptoms may be due to hypertensive encephalopathy since patient came with a very high blood pressure.   Other differential diagnosis considered included glaucoma which is been ruled out by the emergency room doctor with normal ocular pressures  Temporal arteritis should be considered in the differential diagnosis  Patient has known history of sarcoidosis hypertension and hyperlipidemia  CT head did not show any acute changes  CT angiogram did not show any significant vascular disease  Patient Active Problem List   Diagnosis    Right-sided chest wall pain    Pneumonia    Sarcoidosis    Syncope, near    Rectal bleeding    Diverticulosis    HTN (hypertension)    Bright red rectal bleeding    Hypertensive emergency    Headache     RECOMMENDATIONS ;  Discussed with patient  Discussed with patient's nurse  Discussed with Dr. Brooks Veloz to control blood pressure  Check sedimentation rate and C-reactive protein levels. If these are high patient may need to be started on prednisone and also get a temporal artery biopsy  Thank you for this consultation. Please note a portion of this chart was generated using dragon dictation software. Although every effort was made to ensure the accuracy of this automated transcription, some errors in transcription may have occurred.

## 2020-05-26 NOTE — FLOWSHEET NOTE
discussed advance directives with patient. He states that he completed advance directives at the [place] and his niece Britt Odeller is healthcare power of .  requested copy for medical record.

## 2020-05-26 NOTE — PLAN OF CARE
Problem: Pain:  Goal: Control of acute pain  Description: Control of acute pain  Outcome: Ongoing  Note: Pt alert and oriented. Pt able to communicate present pain and use the pain scale appropriately. Nonpharmacological pain reducers and pain medication offered as needed. Will cont to monitor. Problem: Cardiac:  Goal: Hemodynamic stability will improve  Description: Hemodynamic stability will improve  Outcome: Ongoing  Note: Pulse rate and rhythm, peripheral pulses, and capillary refill assessed every shift with assessment. General color and body temperature monitored throughout shift and with vitals. Assess for edema with head to toe assessment. Administer treatments and medications as ordered. Monitor patient's weight.

## 2020-05-26 NOTE — H&P
Hospitalist History and Physical       Edwardo Orozco is a 62 y.o. male   1955         Family Physician:    Chief Complaint:  Severe headache    History of Present Illness:   Patient with history of hypertension, high cholesterol, sarcoidosis, morbid obesity  Patient presented emergency room with 2 days of headache and left frontal area along with some left eyelid discomfort on arrival initial blood pressure was 210/132 now down to 168/103 and a CT of the head which was negative had a CTA  Of  The  Head  which was also negative  Has received multiple medication for the headache with no resolution also diagnosed with left orbital cellulitis with conjunctivitis which is also treated patient be admitted for further evaluation and treatment    Past Medical History:   Diagnosis Date    DJD (degenerative joint disease)     HYPERCHOLESTERAEMIA     Hypertension     Sarcoidosis     Spinal cord injuries      FALL        History reviewed. No pertinent family history.      Social History     Socioeconomic History    Marital status: Single     Spouse name: Not on file    Number of children: Not on file    Years of education: Not on file    Highest education level: Not on file   Occupational History    Not on file   Social Needs    Financial resource strain: Not on file    Food insecurity     Worry: Not on file     Inability: Not on file    Transportation needs     Medical: Not on file     Non-medical: Not on file   Tobacco Use    Smoking status: Former Smoker     Last attempt to quit: 2015     Years since quittin.4    Smokeless tobacco: Never Used    Tobacco comment: 3 cig   Substance and Sexual Activity    Alcohol use: No    Drug use: No    Sexual activity: Not on file   Lifestyle    Physical activity     Days per week: Not on file     Minutes per session: Not on file    Stress: Not on file   Relationships    Social connections     Talks on phone: Not on file     Gets together: Not on file     Attends Spiritism service: Not on file     Active member of club or organization: Not on file     Attends meetings of clubs or organizations: Not on file     Relationship status: Not on file    Intimate partner violence     Fear of current or ex partner: Not on file     Emotionally abused: Not on file     Physically abused: Not on file     Forced sexual activity: Not on file   Other Topics Concern    Not on file   Social History Narrative    Not on file        Allergies   Allergen Reactions    Baclofen      Messes with my urine    Robaxin [Methocarbamol]      Irritates my stomach        Current Facility-Administered Medications   Medication Dose Route Frequency Provider Last Rate Last Dose    ondansetron (ZOFRAN) 4 MG/2ML injection              Current Outpatient Medications   Medication Sig Dispense Refill    amLODIPine (NORVASC) 5 MG tablet Take 1 tablet by mouth daily 30 tablet 0    furosemide (LASIX) 20 MG tablet Take 1 tablet by mouth daily as needed (Take one tablet if body weight increase from baseline by 3-5 lbs.) 30 tablet 0    pantoprazole (PROTONIX) 40 MG tablet Take 40 mg by mouth daily as needed (before breakfast)      cyclobenzaprine (FLEXERIL) 10 MG tablet Take 10 mg by mouth 3 times daily as needed for Muscle spasms      oxyCODONE-acetaminophen (PERCOCET)  MG per tablet Take 1 tablet by mouth every 4 hours as needed for Pain.       atorvastatin (LIPITOR) 40 MG tablet Take 1 tablet by mouth nightly 30 tablet 3    lisinopril (PRINIVIL;ZESTRIL) 40 MG tablet Take 40 mg by mouth daily           Review of Systems:  General appearance: alert, appears stated age and cooperative  Skin: Skin color, texture, normal. No rashes or lesions  HEENT: No nose bleed, headache, vision problems  CV: C/O chest pain, tightness, pressure,   Respiratory: C/o no SOB, LADD, Orthopnea, PND  GI: No abdominal pain, black stool, bloating  Limbs: No c/o edema, pain, swelling, intermittent claudication, joint pains  Neuro: No dizziness, lightheadedness, syncope, gait problems, memory problems  Psych: grossly normal. No SI/depression. Vitals:   Blood pressure (!) 181/106, pulse 96, temperature 97.5 °F (36.4 °C), temperature source Oral, resp. rate 19, height 5' 11\" (1.803 m), weight 258 lb (117 kg), SpO2 99 %.     Physical Exam:    HEENT: AT, NC, left eye conjunctivitis on several neuritis  Neck: No JVD, supple, Thyromegaly , Corotid Bruit  Heart: S1 S2 audible, no murmur   Lungs: CTA   Abdomen: Soft,  Nontender , Bowel Sound: positive  Limbs: No edema   CNS: no focal deficit      Labs:  CBC with Differential:    Lab Results   Component Value Date    WBC 6.7 05/25/2020    RBC 5.85 05/25/2020    HGB 15.3 05/25/2020    HCT 47.8 05/25/2020     05/25/2020    MCV 81.8 05/25/2020    MCH 26.2 05/25/2020    MCHC 32.0 05/25/2020    RDW 15.4 05/25/2020    SEGSPCT 77.5 02/19/2013    LYMPHOPCT 14.0 05/25/2020    MONOPCT 10.2 05/25/2020    BASOPCT 0.9 05/25/2020    MONOSABS 0.7 05/25/2020    LYMPHSABS 0.9 05/25/2020    EOSABS 0.1 05/25/2020    BASOSABS 0.1 05/25/2020     CMP:    Lab Results   Component Value Date     05/25/2020    K 4.1 05/25/2020     05/25/2020    CO2 23 05/25/2020    BUN 16 05/25/2020    CREATININE 1.1 05/25/2020    GFRAA >60 05/25/2020    GFRAA 75 02/20/2013    AGRATIO 1.2 05/25/2020    LABGLOM >60 05/25/2020    GLUCOSE 121 05/25/2020    PROT 8.4 05/25/2020    PROT 6.4 02/20/2013    LABALBU 4.6 05/25/2020    CALCIUM 10.2 05/25/2020    BILITOT 0.3 05/25/2020    ALKPHOS 92 05/25/2020    AST 26 05/25/2020    ALT 22 05/25/2020     Hepatic Function Panel:    Lab Results   Component Value Date    ALKPHOS 92 05/25/2020    ALT 22 05/25/2020    AST 26 05/25/2020    PROT 8.4 05/25/2020    PROT 6.4 02/20/2013    BILITOT 0.3 05/25/2020    BILIDIR <0.2 06/11/2019    IBILI see below 06/11/2019    LABALBU 4.6 05/25/2020     Magnesium:    Lab Results   Component Value Date    MG 2.10 04/28/2018 PT/INR:    Lab Results   Component Value Date    PROTIME 12.0 05/25/2020    INR 1.03 05/25/2020     Last 3 Troponin:    Lab Results   Component Value Date    TROPONINI <0.01 05/25/2020    TROPONINI <0.01 06/11/2019    TROPONINI <0.01 03/22/2019     U/A:    Lab Results   Component Value Date    COLORU Yellow 06/11/2019    PHUR 7.0 06/12/2019    WBCUA 0-2 03/22/2019    RBCUA 3-5 03/22/2019    CLARITYU Clear 06/11/2019    SPECGRAV 1.010 06/11/2019    LEUKOCYTESUR Negative 06/11/2019    UROBILINOGEN 0.2 06/11/2019    BILIRUBINUR Negative 06/11/2019    BLOODU Negative 06/11/2019    GLUCOSEU Negative 06/11/2019     ABG:  No results found for: PHART, EEM6RTV, PO2ART, HKB3VOB, BEART, THGBART, UPZ5WAD, D8ILDIFV  FLP:    Lab Results   Component Value Date    TRIG 114 04/30/2018    HDL 31 04/30/2018    LDLCALC 115 04/30/2018    LABVLDL 23 04/30/2018     TSH:  No results found for: TSH       DATA:   ECG: Sinus Rhythm       ASSESSMENT:   1 severe left frontal headache  Sumatriptan treatment and not yet resolved  2 hypertensive urgency resume home medication and make an adjustment  3 left periorbital cellulitis with conjunctivitis patient treated in ER  4 morbid obesity chronic disease caloric intake  5 sarcoidosis continue home medication  6 high cholesterol resume home medication  Plan:  Resume home medication  Neurology consult  Eyedrops to left eye  Margi Canela  5/26/2020  4:50 AM

## 2020-05-26 NOTE — PLAN OF CARE
Problem: Pain:  Description: Pain management should include both nonpharmacologic and pharmacologic interventions. Goal: Pain level will decrease  Description: Pain level will decrease  Outcome: Ongoing  Note: Headache/eye pain improves with medication, but does not go away. Eye ointment also helps with this pain. Back pain managed with Perc 10/325 at home and here. Goal: Control of acute pain  Description: Control of acute pain  5/26/2020 0906 by Celestino Reese RN  Outcome: Ongoing  5/26/2020 0553 by Madalyn Shepard RN  Outcome: Ongoing  Note: Pt alert and oriented. Pt able to communicate present pain and use the pain scale appropriately. Nonpharmacological pain reducers and pain medication offered as needed. Will cont to monitor. Goal: Control of chronic pain  Description: Control of chronic pain  Outcome: Ongoing     Problem: Cardiac:  Goal: Hemodynamic stability will improve  Description: Hemodynamic stability will improve  5/26/2020 0906 by Celestino Reese RN  Outcome: Ongoing  Note: Patient given Lisinopril 40 mg as at home and given amlodipine 10 mg - new medication, although he as taken it in the past.  He has also taken metoprolol in the past - heart rate 98 this AM.  5/26/2020 0553 by Madalyn Shepard RN  Outcome: Ongoing  Note: Pulse rate and rhythm, peripheral pulses, and capillary refill assessed every shift with assessment. General color and body temperature monitored throughout shift and with vitals. Assess for edema with head to toe assessment. Administer treatments and medications as ordered. Monitor patient's weight.     Goal: Complications related to the disease process, condition or treatment will be avoided or minimized  Description: Complications related to the disease process, condition or treatment will be avoided or minimized  Outcome: Ongoing  Goal: Cerebral tissue perfusion will improve  Description: Cerebral tissue perfusion will improve  Outcome: Ongoing     Problem: Health Behavior:  Goal: Identification of resources available to assist in meeting health care needs will improve  Description: Identification of resources available to assist in meeting health care needs will improve  Outcome: Ongoing     Problem: Falls - Risk of:  Goal: Will remain free from falls  Description: Will remain free from falls  Outcome: Met This Shift  Note: Call light in reach. Patient does not attempt to get out of bed unassisted. Able to use urinal in reclined position in bed.   Goal: Absence of physical injury  Description: Absence of physical injury  Outcome: Ongoing

## 2020-05-27 ENCOUNTER — APPOINTMENT (OUTPATIENT)
Dept: MRI IMAGING | Age: 65
End: 2020-05-27
Payer: OTHER GOVERNMENT

## 2020-05-27 LAB
A/G RATIO: 1.1 (ref 1.1–2.2)
ALBUMIN SERPL-MCNC: 3.8 G/DL (ref 3.4–5)
ALP BLD-CCNC: 88 U/L (ref 40–129)
ALT SERPL-CCNC: 21 U/L (ref 10–40)
ANION GAP SERPL CALCULATED.3IONS-SCNC: 10 MMOL/L (ref 3–16)
AST SERPL-CCNC: 15 U/L (ref 15–37)
BASOPHILS ABSOLUTE: 0 K/UL (ref 0–0.2)
BASOPHILS RELATIVE PERCENT: 0.2 %
BILIRUB SERPL-MCNC: 0.3 MG/DL (ref 0–1)
BUN BLDV-MCNC: 24 MG/DL (ref 7–20)
C-REACTIVE PROTEIN: 3.4 MG/L (ref 0–5.1)
CALCIUM SERPL-MCNC: 10 MG/DL (ref 8.3–10.6)
CHLORIDE BLD-SCNC: 100 MMOL/L (ref 99–110)
CO2: 23 MMOL/L (ref 21–32)
CREAT SERPL-MCNC: 1.2 MG/DL (ref 0.8–1.3)
EOSINOPHILS ABSOLUTE: 0 K/UL (ref 0–0.6)
EOSINOPHILS RELATIVE PERCENT: 0 %
GFR AFRICAN AMERICAN: >60
GFR NON-AFRICAN AMERICAN: >60
GLOBULIN: 3.5 G/DL
GLUCOSE BLD-MCNC: 157 MG/DL (ref 70–99)
HCT VFR BLD CALC: 45 % (ref 40.5–52.5)
HEMOGLOBIN: 14.3 G/DL (ref 13.5–17.5)
LYMPHOCYTES ABSOLUTE: 1.1 K/UL (ref 1–5.1)
LYMPHOCYTES RELATIVE PERCENT: 7.5 %
MCH RBC QN AUTO: 26.1 PG (ref 26–34)
MCHC RBC AUTO-ENTMCNC: 31.7 G/DL (ref 31–36)
MCV RBC AUTO: 82.2 FL (ref 80–100)
MONOCYTES ABSOLUTE: 1.1 K/UL (ref 0–1.3)
MONOCYTES RELATIVE PERCENT: 7.4 %
NEUTROPHILS ABSOLUTE: 12.8 K/UL (ref 1.7–7.7)
NEUTROPHILS RELATIVE PERCENT: 84.9 %
PDW BLD-RTO: 15.2 % (ref 12.4–15.4)
PLATELET # BLD: 228 K/UL (ref 135–450)
PMV BLD AUTO: 8.2 FL (ref 5–10.5)
POTASSIUM REFLEX MAGNESIUM: 4.2 MMOL/L (ref 3.5–5.1)
RBC # BLD: 5.47 M/UL (ref 4.2–5.9)
SODIUM BLD-SCNC: 133 MMOL/L (ref 136–145)
TOTAL PROTEIN: 7.3 G/DL (ref 6.4–8.2)
WBC # BLD: 15.1 K/UL (ref 4–11)

## 2020-05-27 PROCEDURE — 99215 OFFICE O/P EST HI 40 MIN: CPT | Performed by: PSYCHIATRY & NEUROLOGY

## 2020-05-27 PROCEDURE — 2580000003 HC RX 258: Performed by: PSYCHIATRY & NEUROLOGY

## 2020-05-27 PROCEDURE — 70551 MRI BRAIN STEM W/O DYE: CPT

## 2020-05-27 PROCEDURE — 85025 COMPLETE CBC W/AUTO DIFF WBC: CPT

## 2020-05-27 PROCEDURE — 96376 TX/PRO/DX INJ SAME DRUG ADON: CPT

## 2020-05-27 PROCEDURE — 6360000002 HC RX W HCPCS: Performed by: INTERNAL MEDICINE

## 2020-05-27 PROCEDURE — 2500000003 HC RX 250 WO HCPCS: Performed by: PSYCHIATRY & NEUROLOGY

## 2020-05-27 PROCEDURE — 2580000003 HC RX 258: Performed by: INTERNAL MEDICINE

## 2020-05-27 PROCEDURE — 80053 COMPREHEN METABOLIC PANEL: CPT

## 2020-05-27 PROCEDURE — 6370000000 HC RX 637 (ALT 250 FOR IP): Performed by: INTERNAL MEDICINE

## 2020-05-27 PROCEDURE — G0378 HOSPITAL OBSERVATION PER HR: HCPCS

## 2020-05-27 PROCEDURE — 96366 THER/PROPH/DIAG IV INF ADDON: CPT

## 2020-05-27 PROCEDURE — 96372 THER/PROPH/DIAG INJ SC/IM: CPT

## 2020-05-27 RX ADMIN — VALPROATE SODIUM 500 MG: 100 INJECTION, SOLUTION INTRAVENOUS at 19:30

## 2020-05-27 RX ADMIN — ERYTHROMYCIN: 5 OINTMENT OPHTHALMIC at 06:22

## 2020-05-27 RX ADMIN — LISINOPRIL 40 MG: 20 TABLET ORAL at 08:36

## 2020-05-27 RX ADMIN — KETOROLAC TROMETHAMINE 30 MG: 30 INJECTION, SOLUTION INTRAMUSCULAR at 03:33

## 2020-05-27 RX ADMIN — ENOXAPARIN SODIUM 40 MG: 40 INJECTION SUBCUTANEOUS at 08:36

## 2020-05-27 RX ADMIN — CYCLOBENZAPRINE HYDROCHLORIDE 10 MG: 10 TABLET, FILM COATED ORAL at 19:51

## 2020-05-27 RX ADMIN — OXYCODONE AND ACETAMINOPHEN 1 TABLET: 325; 10 TABLET ORAL at 14:44

## 2020-05-27 RX ADMIN — KETOROLAC TROMETHAMINE 30 MG: 30 INJECTION, SOLUTION INTRAMUSCULAR at 09:38

## 2020-05-27 RX ADMIN — OXYCODONE AND ACETAMINOPHEN 1 TABLET: 325; 10 TABLET ORAL at 06:33

## 2020-05-27 RX ADMIN — FUROSEMIDE 20 MG: 20 TABLET ORAL at 13:30

## 2020-05-27 RX ADMIN — Medication 10 ML: at 20:36

## 2020-05-27 RX ADMIN — Medication 10 ML: at 17:14

## 2020-05-27 RX ADMIN — OXYCODONE AND ACETAMINOPHEN 1 TABLET: 325; 10 TABLET ORAL at 19:38

## 2020-05-27 RX ADMIN — Medication 10 ML: at 09:39

## 2020-05-27 RX ADMIN — ERYTHROMYCIN: 5 OINTMENT OPHTHALMIC at 13:30

## 2020-05-27 RX ADMIN — ERYTHROMYCIN: 5 OINTMENT OPHTHALMIC at 19:53

## 2020-05-27 RX ADMIN — KETOROLAC TROMETHAMINE 30 MG: 30 INJECTION, SOLUTION INTRAMUSCULAR at 17:13

## 2020-05-27 RX ADMIN — OXYCODONE AND ACETAMINOPHEN 1 TABLET: 325; 10 TABLET ORAL at 02:30

## 2020-05-27 RX ADMIN — OXYCODONE AND ACETAMINOPHEN 1 TABLET: 325; 10 TABLET ORAL at 10:40

## 2020-05-27 RX ADMIN — AMLODIPINE BESYLATE 10 MG: 5 TABLET ORAL at 08:36

## 2020-05-27 RX ADMIN — Medication 10 ML: at 08:37

## 2020-05-27 RX ADMIN — DESMOPRESSIN ACETATE 40 MG: 0.2 TABLET ORAL at 19:51

## 2020-05-27 ASSESSMENT — PAIN DESCRIPTION - ORIENTATION
ORIENTATION_2: LOWER
ORIENTATION: ANTERIOR
ORIENTATION: ANTERIOR
ORIENTATION_2: LOWER
ORIENTATION: ANTERIOR
ORIENTATION_2: LOWER
ORIENTATION_2: LOWER
ORIENTATION: ANTERIOR

## 2020-05-27 ASSESSMENT — PAIN DESCRIPTION - ONSET
ONSET_2: ON-GOING
ONSET: ON-GOING
ONSET: PROGRESSIVE
ONSET: ON-GOING
ONSET: ON-GOING
ONSET_2: PROGRESSIVE

## 2020-05-27 ASSESSMENT — PAIN DESCRIPTION - INTENSITY
RATING_2: 8
RATING_2: 7
RATING_2: 6
RATING_2: 7
RATING_2: 7
RATING_2: 6
RATING_2: 8
RATING_2: 8
RATING_2: 7

## 2020-05-27 ASSESSMENT — PAIN SCALES - GENERAL
PAINLEVEL_OUTOF10: 8
PAINLEVEL_OUTOF10: 5
PAINLEVEL_OUTOF10: 7
PAINLEVEL_OUTOF10: 5
PAINLEVEL_OUTOF10: 8
PAINLEVEL_OUTOF10: 5
PAINLEVEL_OUTOF10: 8
PAINLEVEL_OUTOF10: 8
PAINLEVEL_OUTOF10: 7
PAINLEVEL_OUTOF10: 5
PAINLEVEL_OUTOF10: 7
PAINLEVEL_OUTOF10: 6

## 2020-05-27 ASSESSMENT — PAIN DESCRIPTION - PROGRESSION
CLINICAL_PROGRESSION: GRADUALLY IMPROVING
CLINICAL_PROGRESSION_2: GRADUALLY WORSENING
CLINICAL_PROGRESSION: GRADUALLY IMPROVING
CLINICAL_PROGRESSION: GRADUALLY IMPROVING
CLINICAL_PROGRESSION_2: GRADUALLY IMPROVING
CLINICAL_PROGRESSION: GRADUALLY IMPROVING
CLINICAL_PROGRESSION_2: GRADUALLY WORSENING
CLINICAL_PROGRESSION: GRADUALLY WORSENING
CLINICAL_PROGRESSION: GRADUALLY WORSENING
CLINICAL_PROGRESSION_2: GRADUALLY WORSENING
CLINICAL_PROGRESSION: GRADUALLY IMPROVING

## 2020-05-27 ASSESSMENT — PAIN DESCRIPTION - PAIN TYPE
TYPE_2: CHRONIC PAIN
TYPE: ACUTE PAIN
TYPE_2: CHRONIC PAIN
TYPE: ACUTE PAIN
TYPE: ACUTE PAIN
TYPE_2: CHRONIC PAIN
TYPE: ACUTE PAIN
TYPE_2: CHRONIC PAIN
TYPE: ACUTE PAIN
TYPE: ACUTE PAIN
TYPE_2: CHRONIC PAIN

## 2020-05-27 ASSESSMENT — PAIN DESCRIPTION - LOCATION
LOCATION_2: BACK
LOCATION_2: BACK
LOCATION: HEAD
LOCATION_2: BACK
LOCATION_2: BACK
LOCATION: HEAD

## 2020-05-27 ASSESSMENT — PAIN DESCRIPTION - FREQUENCY
FREQUENCY: INTERMITTENT

## 2020-05-27 ASSESSMENT — PAIN DESCRIPTION - DESCRIPTORS
DESCRIPTORS: ACHING
DESCRIPTORS: ACHING
DESCRIPTORS_2: ACHING
DESCRIPTORS: ACHING
DESCRIPTORS: ACHING
DESCRIPTORS_2: ACHING

## 2020-05-27 ASSESSMENT — PAIN DESCRIPTION - DURATION
DURATION_2: CONTINUOUS

## 2020-05-27 ASSESSMENT — PAIN - FUNCTIONAL ASSESSMENT
PAIN_FUNCTIONAL_ASSESSMENT: PREVENTS OR INTERFERES WITH MANY ACTIVE NOT PASSIVE ACTIVITIES
PAIN_FUNCTIONAL_ASSESSMENT: PREVENTS OR INTERFERES WITH MANY ACTIVE NOT PASSIVE ACTIVITIES
PAIN_FUNCTIONAL_ASSESSMENT: PREVENTS OR INTERFERES WITH ALL ACTIVE AND SOME PASSIVE ACTIVITIES
PAIN_FUNCTIONAL_ASSESSMENT: PREVENTS OR INTERFERES WITH ALL ACTIVE AND SOME PASSIVE ACTIVITIES

## 2020-05-27 ASSESSMENT — PAIN DESCRIPTION - DIRECTION
RADIATING_TOWARDS: BACK OF HEAD/NECK
RADIATING_TOWARDS: AROUND HEAD

## 2020-05-27 NOTE — PLAN OF CARE
Pt complains of pain has requested diludad stating that it is the only thing that helped. Informd pt that this was a one time dose in the ER. Pt states that the percocet are not helping, asked if he wants to stop taking them due to them not helping pt states no since it is the only pain medication we are giving him. Informed pt that he has tylenol which he declined wanting. Informed he had flexeril pt declined wanting.  Pt requested the percocet

## 2020-05-27 NOTE — CARE COORDINATION
Patient admitted as Observation/OPIB with an anticipated short hospitalization length of stay. Chart reviewed and it appears that patient has minimal needs for discharge at this time. Discussed with patients nurse and requested that case management be notified if discharge needs are identified. Case management will continue to follow progress and update discharge plan as needed.     Kai Sepulveda, ELIANN, RN  [phone]

## 2020-05-27 NOTE — PROGRESS NOTES
[facility]    5/27/2020 12:44 PM        Name: Felecia Sandifer . Admitted: 5/25/2020  Primary Care Provider: Herminio Guerrero [id number] (Tel: None)      Subjective:    Patient pain improved but still rquring around the clock toradol    Reviewed interval ancillary notes    Current Medications  amLODIPine (NORVASC) tablet 10 mg, Daily  atorvastatin (LIPITOR) tablet 40 mg, Nightly  cyclobenzaprine (FLEXERIL) tablet 10 mg, TID PRN  furosemide (LASIX) tablet 20 mg, Daily PRN  lisinopril (PRINIVIL;ZESTRIL) tablet 40 mg, Daily  oxyCODONE-acetaminophen (PERCOCET)  MG per tablet 1 tablet, Q4H PRN  pantoprazole (PROTONIX) tablet 40 mg, Daily PRN  sodium chloride flush 0.9 % injection 10 mL, 2 times per day  sodium chloride flush 0.9 % injection 10 mL, PRN  acetaminophen (TYLENOL) tablet 650 mg, Q6H PRN    Or  acetaminophen (TYLENOL) suppository 650 mg, Q6H PRN  polyethylene glycol (GLYCOLAX) packet 17 g, Daily PRN  promethazine (PHENERGAN) tablet 12.5 mg, Q6H PRN    Or  ondansetron (ZOFRAN) injection 4 mg, Q6H PRN  enoxaparin (LOVENOX) injection 40 mg, Daily  potassium chloride 10 mEq/100 mL IVPB (Peripheral Line), PRN  magnesium sulfate 2 g in 50 mL IVPB premix, PRN  ketorolac (TORADOL) injection 30 mg, Q6H PRN  erythromycin (ROMYCIN) ophthalmic ointment, 3 times per day        Objective:  /80   Pulse 89   Temp 97.2 °F (36.2 °C) (Temporal)   Resp 14   Ht 5' 11\" (1.803 m)   Wt 254 lb 10.1 oz (115.5 kg)   SpO2 96%   BMI 35.51 kg/m²     Intake/Output Summary (Last 24 hours) at 5/27/2020 1244  Last data filed at 5/27/2020 1200  Gross per 24 hour   Intake 730 ml   Output 200 ml   Net 530 ml      Wt Readings from Last 3 Encounters:   05/27/20 254 lb 10.1 oz (115.5 kg)   06/13/19 258 lb 6.1 oz (117.2 kg)   03/22/19 250 lb (113.4 kg)       General appearance:  Appears comfortable.  AAOx3  HEENT: atraumatic, Pupils equal, muscous membranes moist, no masses appreciated  Cardiovascular: Regular rate and rhythm no murmurs appreciated  Respiratory: CTAB no wheezing  Gastrointestinal: Abdomen soft, non-tender, BS+  EXT: no edema  Neurology: no gross focal deficts  Psychiatry: Appropriate affect. Not agitated  Skin: Warm, dry, no rashes appreciated    Labs and Tests:  CBC:   Recent Labs     05/25/20 2210 05/27/20  0320   WBC 6.7 15.1*   HGB 15.3 14.3    228     BMP:    Recent Labs     05/25/20 2210 05/27/20  0320   * 133*   K 4.1 4.2    100   CO2 23 23   BUN 16 24*   CREATININE 1.1 1.2   GLUCOSE 121* 157*     Hepatic:   Recent Labs     05/25/20 2210 05/27/20  0320   AST 26 15   ALT 22 21   BILITOT 0.3 0.3   ALKPHOS 92 88     CTA HEAD NECK W CONTRAST   Final Result   Unremarkable CTA of the head and neck. CT HEAD WO CONTRAST   Final Result   No acute intracranial abnormality. XR CHEST PORTABLE   Final Result   No acute cardiopulmonary disease. Recent imaging reviewed    Problem List  Active Problems:    Headache  Resolved Problems:    * No resolved hospital problems.  *       Assessment & Plan:   Headache  - esr not greatly elvated, crp pending  - continue pain management await neuro recs    Leukocytosis: secondary to steroids in ed will monitor    Hypertensive urgency: titrate up meds    Diet: DIET GENERAL;  Code:Full Code  DVT PPXlovneox  Disposition home pendign workup      Lezlie Holter, MD   5/27/2020 12:44 PM

## 2020-05-27 NOTE — PROGRESS NOTES
When discussing pain patient stated head and eye pain was improved, but again gave it an 8 on the 0-10 scale. This is the same level as earlier when it was very bad. Attempted to discuss pain scale with him, but he stated that he understood it well.

## 2020-05-27 NOTE — PROGRESS NOTES
NEUROLOGY FOLLOWUP    HISTORY OF PRESENT ILLNESS :     Erika Encinas is a 62 y.o. male   History was obtained from the patient and dictations in the chart. Patient states that his headache is better but he has been getting Toradol almost on a regular basis. He is also on home Percocet. Patient states that the headache is now on both temporal regions and all over the head. He rates it as mild as long as he gets the pain medication. He states that he is never had similar headaches in the past.  He complains of some blurred vision in the left eye. Detailed history:  Patient has history of hypertension hyperlipidemia and sarcoidosis. He came to the hospital emergency room with a 2-day history of headache in the left frontal area with some left eye pain and some redness in the eye. He also complained of blurred vision in the left eye. Initial blood pressure was 210/132. CT head did not show any abnormalities. CT angiogram of the head also did not show any abnormalities. He was admitted for further evaluation. The emergency room physician noted that he checked eye pressure and that the eye pressures were normal and he had ruled out glaucoma. Patient states that his pain is improved. He denied ever having any similar symptoms in the past.  He denies any significant jaw pain.     REVIEW OF SYSTEMS       Constitutional:  []   Chills   []  Fatigue   []  Fevers   []  Malaise   []  Weight loss     [x] Denies all of the above    Respiratory:   []  Cough    []  Shortness of breath         [x] Denies all of the above     Cardiovascular:   []  Chest pain    []  Exertional chest pressure/discomfort           [] Palpitations    []  Syncope     [x] Denies all of the above    Past Medical History:   Diagnosis Date    DJD (degenerative joint disease)     HYPERCHOLESTERAEMIA     Hypertension     Sarcoidosis     Spinal cord injuries      FALL     History reviewed. No pertinent family history. Social History     Socioeconomic History    Marital status: Single     Spouse name: None    Number of children: None    Years of education: None    Highest education level: None   Occupational History    None   Social Needs    Financial resource strain: None    Food insecurity     Worry: None     Inability: None    Transportation needs     Medical: None     Non-medical: None   Tobacco Use    Smoking status: Former Smoker     Last attempt to quit: 2015     Years since quittin.4    Smokeless tobacco: Never Used    Tobacco comment: 3 cig   Substance and Sexual Activity    Alcohol use: No    Drug use: No    Sexual activity: None   Lifestyle    Physical activity     Days per week: None     Minutes per session: None    Stress: None   Relationships    Social connections     Talks on phone: None     Gets together: None     Attends Cheondoism service: None     Active member of club or organization: None     Attends meetings of clubs or organizations: None     Relationship status: None    Intimate partner violence     Fear of current or ex partner: None     Emotionally abused: None     Physically abused: None     Forced sexual activity: None   Other Topics Concern    None   Social History Narrative    None        PHYSICAL EXAMINATION      /80   Pulse 89   Temp 97.2 °F (36.2 °C) (Temporal)   Resp 14   Ht 5' 11\" (1.803 m)   Wt 254 lb 10.1 oz (115.5 kg)   SpO2 96%   BMI 35.51 kg/m²   This is a well-nourished patient in no acute distress  Patient is awake, alert and oriented x3. Speech is normal.  Pupils are equal round reacting to light. Extraocular movements intact. Face symmetrical. Tongue midline. Motor exam shows normal symmetrical strength. Deep tendon reflexes normal. Plantar reflexes downgoing. Sensory exam normal. Coordination normal.. No carotid bruit. No neck stiffness.     DATA :  LABS:  General Labs: CBC:   Lab Results   Component Value Date    WBC 15.1 05/27/2020    RBC 5.47 05/27/2020    HGB 14.3 05/27/2020    HCT 45.0 05/27/2020    MCV 82.2 05/27/2020    MCH 26.1 05/27/2020    MCHC 31.7 05/27/2020    RDW 15.2 05/27/2020     05/27/2020    MPV 8.2 05/27/2020     BMP:    Lab Results   Component Value Date     05/27/2020    K 4.2 05/27/2020     05/27/2020    CO2 23 05/27/2020    BUN 24 05/27/2020    LABALBU 3.8 05/27/2020    CREATININE 1.2 05/27/2020    CALCIUM 10.0 05/27/2020    GFRAA >60 05/27/2020    GFRAA 75 02/20/2013    LABGLOM >60 05/27/2020    GLUCOSE 157 05/27/2020     RADIOLOGY REVIEW:  I have reviewed radiology image(s) and reports(s) of: CT scan of the head      IMPRESSION :  Persistent headache now bitemporal, etiology unclear  ? Vascular headache  CT head and CT angiogram were normal  Could be hypertensive encephalopathy that is improving. Initial blood pressure was 220/132. Sedimentation rate is only 24 and hence temporal arteritis is unlikely; in addition CRP was normal    Patient Active Problem List   Diagnosis    Right-sided chest wall pain    Pneumonia    Sarcoidosis    Syncope, near    Rectal bleeding    Diverticulosis    HTN (hypertension)    Bright red rectal bleeding    Hypertensive emergency    Headache       RECOMMENDATIONS :  Discussed with patient  Discussed with Dr. Sueellen Goltz  I will get an MRI brain to rule out any structural lesion in the brain  I will also give him a trial of IV Depakote to see if it helps  D.H.E. [address] would be relatively contraindicated because of his hypertension  Continue to control blood pressure          Please note a portion of this chart was generated using dragon dictation software. Although every effort was made to ensure the accuracy of this automated transcription, some errors in transcription may have occurred.          Ayaka Davis M.D.

## 2020-05-27 NOTE — PROGRESS NOTES
[address] or Facility: [facility] From: Mary Garcia RE: Jena Abbott dillon 1955 RM: [id number] WBC 05/25 was 6.7 today 15.1 no blood cultures were done upon admission Need Callback: NEEDS CALLBACK CVU ROUTINE sent at [phone]  Read 6:11 AM   No response received

## 2020-05-28 VITALS
DIASTOLIC BLOOD PRESSURE: 95 MMHG | RESPIRATION RATE: 18 BRPM | OXYGEN SATURATION: 96 % | WEIGHT: 259.7 LBS | TEMPERATURE: 97.3 F | SYSTOLIC BLOOD PRESSURE: 138 MMHG | HEART RATE: 72 BPM | HEIGHT: 71 IN | BODY MASS INDEX: 36.36 KG/M2

## 2020-05-28 LAB
BASOPHILS ABSOLUTE: 0.1 K/UL (ref 0–0.2)
BASOPHILS RELATIVE PERCENT: 1 %
EOSINOPHILS ABSOLUTE: 0.1 K/UL (ref 0–0.6)
EOSINOPHILS RELATIVE PERCENT: 1.5 %
HCT VFR BLD CALC: 41.9 % (ref 40.5–52.5)
HEMOGLOBIN: 13.5 G/DL (ref 13.5–17.5)
LYMPHOCYTES ABSOLUTE: 1.2 K/UL (ref 1–5.1)
LYMPHOCYTES RELATIVE PERCENT: 17 %
MCH RBC QN AUTO: 26.1 PG (ref 26–34)
MCHC RBC AUTO-ENTMCNC: 32.2 G/DL (ref 31–36)
MCV RBC AUTO: 81.2 FL (ref 80–100)
MONOCYTES ABSOLUTE: 0.9 K/UL (ref 0–1.3)
MONOCYTES RELATIVE PERCENT: 12.3 %
NEUTROPHILS ABSOLUTE: 4.9 K/UL (ref 1.7–7.7)
NEUTROPHILS RELATIVE PERCENT: 68.2 %
PDW BLD-RTO: 15.3 % (ref 12.4–15.4)
PLATELET # BLD: 202 K/UL (ref 135–450)
PMV BLD AUTO: 7.7 FL (ref 5–10.5)
RBC # BLD: 5.15 M/UL (ref 4.2–5.9)
WBC # BLD: 7.2 K/UL (ref 4–11)

## 2020-05-28 PROCEDURE — 2580000003 HC RX 258: Performed by: INTERNAL MEDICINE

## 2020-05-28 PROCEDURE — 96366 THER/PROPH/DIAG IV INF ADDON: CPT

## 2020-05-28 PROCEDURE — G0378 HOSPITAL OBSERVATION PER HR: HCPCS

## 2020-05-28 PROCEDURE — 85025 COMPLETE CBC W/AUTO DIFF WBC: CPT

## 2020-05-28 PROCEDURE — 2580000003 HC RX 258: Performed by: PSYCHIATRY & NEUROLOGY

## 2020-05-28 PROCEDURE — 96372 THER/PROPH/DIAG INJ SC/IM: CPT

## 2020-05-28 PROCEDURE — 6370000000 HC RX 637 (ALT 250 FOR IP): Performed by: INTERNAL MEDICINE

## 2020-05-28 PROCEDURE — 99214 OFFICE O/P EST MOD 30 MIN: CPT | Performed by: PSYCHIATRY & NEUROLOGY

## 2020-05-28 PROCEDURE — 2500000003 HC RX 250 WO HCPCS: Performed by: PSYCHIATRY & NEUROLOGY

## 2020-05-28 PROCEDURE — 6360000002 HC RX W HCPCS: Performed by: INTERNAL MEDICINE

## 2020-05-28 RX ORDER — AMLODIPINE BESYLATE 10 MG/1
10 TABLET ORAL DAILY
Qty: 30 TABLET | Refills: 3 | Status: SHIPPED | OUTPATIENT
Start: 2020-05-29 | End: 2021-04-15 | Stop reason: CLARIF

## 2020-05-28 RX ADMIN — OXYCODONE AND ACETAMINOPHEN 1 TABLET: 325; 10 TABLET ORAL at 04:52

## 2020-05-28 RX ADMIN — LISINOPRIL 40 MG: 20 TABLET ORAL at 08:06

## 2020-05-28 RX ADMIN — VALPROATE SODIUM 500 MG: 100 INJECTION, SOLUTION INTRAVENOUS at 01:02

## 2020-05-28 RX ADMIN — OXYCODONE AND ACETAMINOPHEN 1 TABLET: 325; 10 TABLET ORAL at 00:45

## 2020-05-28 RX ADMIN — AMLODIPINE BESYLATE 10 MG: 5 TABLET ORAL at 08:06

## 2020-05-28 RX ADMIN — OXYCODONE AND ACETAMINOPHEN 1 TABLET: 325; 10 TABLET ORAL at 13:27

## 2020-05-28 RX ADMIN — OXYCODONE AND ACETAMINOPHEN 1 TABLET: 325; 10 TABLET ORAL at 08:55

## 2020-05-28 RX ADMIN — Medication 10 ML: at 08:08

## 2020-05-28 RX ADMIN — ENOXAPARIN SODIUM 40 MG: 40 INJECTION SUBCUTANEOUS at 08:05

## 2020-05-28 RX ADMIN — ERYTHROMYCIN: 5 OINTMENT OPHTHALMIC at 05:09

## 2020-05-28 ASSESSMENT — PAIN DESCRIPTION - PAIN TYPE
TYPE: CHRONIC PAIN

## 2020-05-28 ASSESSMENT — PAIN SCALES - GENERAL
PAINLEVEL_OUTOF10: 5
PAINLEVEL_OUTOF10: 5
PAINLEVEL_OUTOF10: 8
PAINLEVEL_OUTOF10: 9
PAINLEVEL_OUTOF10: 8
PAINLEVEL_OUTOF10: 9
PAINLEVEL_OUTOF10: 7

## 2020-05-28 ASSESSMENT — PAIN DESCRIPTION - LOCATION
LOCATION: BACK

## 2020-05-28 ASSESSMENT — PAIN DESCRIPTION - ORIENTATION
ORIENTATION: LOWER
ORIENTATION: LOWER

## 2020-05-28 NOTE — PROGRESS NOTES
Perfect serve:  Questions: WBC increased from 6.7 to 15.1 - afebrile - inflammatory response? Sodium down from 135 to 133 today. Any treatment? Fluid restriction? Urine output down today. Will give PRN Lasix 20 mg now. Weight up 1.6 kg since yesterday. Patient taking IV Toradol 30 mg q 6 hours for pain. Change to something oral? Also taking his home Percocet 10/325 regularly every 4 hours. Sed rate 24, still awaiting C-reactive protein. Patient asking about when he will go home, but just checking to see if everything is addressed first. Call if any questions or address as appropriate. Response:   Will keep today if still having that much pain and wbc secondary to steroids in ed

## 2020-05-28 NOTE — PLAN OF CARE
Problem: Pain:  Description: Pain management should include both nonpharmacologic and pharmacologic interventions. Goal: Pain level will decrease  Description: Pain level will decrease  Outcome: Met This Shift  Goal: Control of acute pain  Description: Control of acute pain  Outcome: Met This Shift  Note: Patient has decided not to take the IV toradol any more as he will not have it available at home and he would like to go home tomorrow. Goal: Control of chronic pain  Description: Control of chronic pain  Outcome: Met This Shift  Note: Home percocet 10/325 has been effective for his chronic back pain.      Problem: Cardiac:  Goal: Hemodynamic stability will improve  Description: Hemodynamic stability will improve  Outcome: Met This Shift  Goal: Complications related to the disease process, condition or treatment will be avoided or minimized  Description: Complications related to the disease process, condition or treatment will be avoided or minimized  Outcome: Ongoing  Goal: Cerebral tissue perfusion will improve  Description: Cerebral tissue perfusion will improve  Outcome: Ongoing     Problem: Health Behavior:  Goal: Identification of resources available to assist in meeting health care needs will improve  Description: Identification of resources available to assist in meeting health care needs will improve  Outcome: Ongoing     Problem: Falls - Risk of:  Goal: Will remain free from falls  Description: Will remain free from falls  Outcome: Met This Shift  Goal: Absence of physical injury  Description: Absence of physical injury  Outcome: Met This Shift

## 2020-05-28 NOTE — DISCHARGE INSTR - COC
Continuity of Care Form    Patient Name: Shaunna Vance   :  1955  MRN:  [id number]    Admit date:  2020  Discharge date:  2020    Code Status Order: Full Code   Advance Directives:   Advance Care Flowsheet Documentation     Date/Time Healthcare Directive Type of Healthcare Directive Copy in [address] Agent's Name Healthcare Agent's Phone Number    20 1357  Yes, patient has an advance directive for healthcare treatment  Durable power of  for health care;Living will  No, copy requested from family  Healthcare power of   [facility] patient says his niece Maday Keith is [address] [facility] --    20 0552  No, patient does not have an advance directive for healthcare treatment  --  --  --  -- --          Admitting Physician:  Criselda Lee MD  PCP: Herminio Guerrero [id number]    Discharging Nurse: Krishan Canela Unit/Room#: CVU-2904/2904-01  Discharging Unit Phone Number: [phone]    Emergency Contact:   Extended Emergency Contact Information  Primary Emergency Contact: [address] Phone: [phone]  Mobile Phone: [phone]  Relation: Brother/Sister    Past Surgical History:  Past Surgical History:   Procedure Laterality Date    BACK SURGERY      COLONOSCOPY  2018    COLONOSCOPY POLYPECTOMY SNARE/COLD BIOPSY performed by [facility]    OTHER SURGICAL HISTORY  2018    [facility]    UPPER GASTROINTESTINAL ENDOSCOPY  2018    COLONOSCOPY SUBMUCOSAL/BOTOX INJECTION performed by Jena Gupta MD at [facility] ENDOSCOPY       Immunization History: There is no immunization history on file for this patient.     Active Problems:  Patient Active Problem List   Diagnosis Code    Right-sided chest wall pain R07.89    Pneumonia J18.9    Sarcoidosis D86.9    Syncope, near R55    Rectal bleeding K62.5    Diverticulosis K57.90    HTN (hypertension) I10    Bright red rectal bleeding K62.5    Hypertensive emergency I16.1    Headache R51       Isolation/Infection:   Isolation          No Isolation        Patient Infection Status     None to display          Nurse Assessment:  Last Vital Signs: BP (!) 164/105   Pulse 68   Temp 97.6 °F (36.4 °C) (Temporal)   Resp 14   Ht 5' 11\" (1.803 m)   Wt 259 lb 11.2 oz (117.8 kg)   SpO2 96%   BMI 36.22 kg/m²     Last documented pain score (0-10 scale): Pain Level: 9  Last Weight:   Wt Readings from Last 1 Encounters:   05/28/20 259 lb 11.2 oz (117.8 kg)     Mental Status:  oriented    IV Access:  - None    Nursing Mobility/ADLs:  Walking   Walker  Transfer  Assisted  Bathing  Assisted  Dressing  Independent  Toileting  Independent  Feeding  Independent  Med Admin  Independent  Med Delivery   none    Wound Care Documentation and Therapy:  Incision 05/01/18 Back Posterior (Active)   Number of days: 288        Elimination:  Continence:   · Bowel: Yes  · Bladder: Yes  Urinary Catheter: None   Colostomy/Ileostomy/Ileal Conduit: No       Date of Last BM: 5/26/2020    Intake/Output Summary (Last 24 hours) at 5/28/2020 0953  Last data filed at 5/28/2020 0808  Gross per 24 hour   Intake 1200 ml   Output 845 ml   Net 355 ml     I/O last 3 completed shifts: In: 9918 [P.O.:1440; I.V.:10; IV Piggyback:100]  Out: 725 [Urine:725]    Safety Concerns:     None    Impairments/Disabilities:      None    Nutrition Therapy:  Current Nutrition Therapy:   - Oral Diet:  General    Routes of Feeding: None  Liquids: No Restrictions  Daily Fluid Restriction: no  Last Modified Barium Swallow with Video (Video Swallowing Test): not done    Treatments at the Time of Hospital Discharge:   Respiratory Treatments: ***  Oxygen Therapy:  is not on home oxygen therapy.   Ventilator:    - No ventilator support    Rehab Therapies: ***  Weight Bearing Status/Restrictions: No weight bearing restirctions  Other Medical Equipment (for information only, NOT a DME order):  walker  Other Treatments: ***    Patient's personal belongings (please select all that are sent with patient):  ***    RN SIGNATURE:  Electronically signed by Alma Lux RN on 5/28/20 at 9:58 AM EDT    CASE MANAGEMENT/SOCIAL WORK SECTION    Inpatient Status Date: ***    Readmission Risk Assessment Score:  Readmission Risk              Risk of Unplanned Readmission:        0           Discharging to Facility/ Agency   · Name:   · Address:  · Phone:  · Fax:    Dialysis Facility (if applicable)   · Name:  · Address:  · Dialysis Schedule:  · Phone:  · Fax:    / signature: {Esignature:[id number]}    PHYSICIAN SECTION    Prognosis: {Prognosis:7945726656}    Condition at Discharge: [address] [place] [address] Patient Condition:[id number]}    Rehab Potential (if transferring to Rehab): {Prognosis:9399322317}    Recommended Labs or Other Treatments After Discharge: ***    Physician Certification: I certify the above information and transfer of Eric Radford  is necessary for the continuing treatment of the diagnosis listed and that he requires {Admit to Appropriate Level of Care:57518} for {GREATER/LESS:658331601} 30 days.      Update Admission H&P: No change in H&P    PHYSICIAN SIGNATURE:  {Esignature:[id number]}

## 2020-05-28 NOTE — FLOWSHEET NOTE
Discharge instructions reviewed with pt. Pt states that he does not need to  any meds, he has plenty at home. Pt discharged via wheelchair to brother's car through front lobby with pt belongings in bag with pt.

## 2020-05-28 NOTE — PROGRESS NOTES
NEUROLOGY FOLLOWUP    HISTORY OF PRESENT ILLNESS :     Amrit Lamar is a 62 y.o. male   History was obtained from the patient and dictations in the chart. Patient states that his headache is better. He felt that the headache increase slightly as he was getting the IV Depakote but later it felt better. His vision symptoms are also better. Detailed history:  Patient has history of hypertension hyperlipidemia and sarcoidosis. He came to the hospital emergency room with a 2-day history of headache in the left frontal area with some left eye pain and some redness in the eye. He also complained of blurred vision in the left eye. Initial blood pressure was 210/132. CT head did not show any abnormalities. CT angiogram of the head also did not show any abnormalities. He was admitted for further evaluation. The emergency room physician noted that he checked eye pressure and that the eye pressures were normal and he had ruled out glaucoma. Patient states that his pain is improved. He denied ever having any similar symptoms in the past.  He denies any significant jaw pain. REVIEW OF SYSTEMS       Constitutional:  []   Chills   []  Fatigue   []  Fevers   []  Malaise   []  Weight loss     [x] Denies all of the above    Respiratory:   []  Cough    []  Shortness of breath         [x] Denies all of the above     Cardiovascular:   []  Chest pain    []  Exertional chest pressure/discomfort           [] Palpitations    []  Syncope     [x] Denies all of the above    Past Medical History:   Diagnosis Date    DJD (degenerative joint disease)     HYPERCHOLESTERAEMIA     Hypertension     Sarcoidosis     Spinal cord injuries      FALL     History reviewed. No pertinent family history.   Social History     Socioeconomic History    Marital status: Single     Spouse name: None    Number of children: None    Years of education: None    Highest education level: None   Occupational History    None   Social Needs    Financial resource strain: None    Food insecurity     Worry: None     Inability: None    Transportation needs     Medical: None     Non-medical: None   Tobacco Use    Smoking status: Former Smoker     Last attempt to quit: 2015     Years since quittin.4    Smokeless tobacco: Never Used    Tobacco comment: 3 cig   Substance and Sexual Activity    Alcohol use: No    Drug use: No    Sexual activity: None   Lifestyle    Physical activity     Days per week: None     Minutes per session: None    Stress: None   Relationships    Social connections     Talks on phone: None     Gets together: None     Attends Adventism service: None     Active member of club or organization: None     Attends meetings of clubs or organizations: None     Relationship status: None    Intimate partner violence     Fear of current or ex partner: None     Emotionally abused: None     Physically abused: None     Forced sexual activity: None   Other Topics Concern    None   Social History Narrative    None        PHYSICAL EXAMINATION      BP (!) 138/95   Pulse 72   Temp 97.3 °F (36.3 °C) (Temporal)   Resp 18   Ht 5' 11\" (1.803 m)   Wt 259 lb 11.2 oz (117.8 kg)   SpO2 96%   BMI 36.22 kg/m²   This is a well-nourished patient in no acute distress  Patient is awake, alert and oriented x3. Speech is normal.  Pupils are equal round reacting to light. Extraocular movements intact. Face symmetrical. Tongue midline. Motor exam shows normal symmetrical strength. Deep tendon reflexes normal. Plantar reflexes downgoing. Sensory exam normal. Coordination normal.. No carotid bruit. No neck stiffness.     DATA :  LABS:  General Labs:    CBC:   Lab Results   Component Value Date    WBC 7.2 2020    RBC 5.15 2020    HGB 13.5 2020    HCT 41.9 2020    MCV 81.2 2020    MCH 26.1 2020    MCHC 32.2 2020 RDW 15.3 05/28/2020     05/28/2020    MPV 7.7 05/28/2020     BMP:    Lab Results   Component Value Date     05/27/2020    K 4.2 05/27/2020     05/27/2020    CO2 23 05/27/2020    BUN 24 05/27/2020    LABALBU 3.8 05/27/2020    CREATININE 1.2 05/27/2020    CALCIUM 10.0 05/27/2020    GFRAA >60 05/27/2020    GFRAA 75 02/20/2013    LABGLOM >60 05/27/2020    GLUCOSE 157 05/27/2020     RADIOLOGY REVIEW:  I have reviewed radiology image(s) and reports(s) of: CT scan of the head      IMPRESSION :  Persistent headache now bitemporal, etiology unclear, however symptoms are much improved  ? Vascular headache, improved with IV Depakote  CT head and CT angiogram were normal  Could be hypertensive encephalopathy that is improving. Initial blood pressure was 220/132. Sedimentation rate is only 24 and hence temporal arteritis is unlikely; in addition CRP was normal  MRI brain images were reviewed and were normal    Patient Active Problem List   Diagnosis    Right-sided chest wall pain    Pneumonia    Sarcoidosis    Syncope, near    Rectal bleeding    Diverticulosis    HTN (hypertension)    Bright red rectal bleeding    Hypertensive emergency    Headache       RECOMMENDATIONS :  Discussed with patient  Discussed with Dr. Aline Padilla to control blood pressure  Okay to discharge from a neurological standpoint. He will follow-up with his primary care physician after discharge. Please note a portion of this chart was generated using dragon dictation software. Although every effort was made to ensure the accuracy of this automated transcription, some errors in transcription may have occurred.          Charles Oh M.D.

## 2020-05-30 NOTE — DISCHARGE SUMMARY
Hospital Medicine Discharge Summary    Patient: Radha Quinones     Gender: male  : 1955   Age: 62 y.o. MRN: [id number]    Admitting Physician: Lesvia Martino MD  Discharge Physician: Kathleen Haas MD     Code Status: Prior     Admit Date: 2020   Discharge Date: 2020      Disposition:  Home      Discharge Diagnoses: Active Hospital Problems    Diagnosis Date Noted    Headache [R51] 2020   Leukocytosis: secondary to steroids  Hypertensive urgency:       Condition at Discharge:  Stable    Hospital Course:   Admitted to hospital with headache and hypertensive urgency. Patient's blood pressure medication were titrated up and blood pressure normalized. Patient had ESR and CRP checked which were not significantly elevated MRI was performed per neurology and was negative. Patient received a dose of IV Depakote and pain improved patient was cleared for discharge by neurology and patient was discharged home with follow-up with PCP    Discharge Exam:    BP (!) 138/95   Pulse 72   Temp 97.3 °F (36.3 °C) (Temporal)   Resp 18   Ht 5' 11\" (1.803 m)   Wt 259 lb 11.2 oz (117.8 kg)   SpO2 96%   BMI 36.22 kg/m²   General appearance:  Appears comfortable. AAOx3  HEENT: atraumatic, Pupils equal, muscous membranes moist, no masses appreciated  Cardiovascular: Regular rate and rhythm no murmurs appreciated  Respiratory: CTAB no wheezing  Gastrointestinal: Abdomen soft, non-tender, BS+  EXT: no edema  Neurology: no gross focal deficts  Psychiatry: Appropriate affect.  Not agitated  Skin: Warm, dry, no rashes appreciated    Discharge Medications:   Discharge Medication List as of 2020  1:16 PM        Discharge Medication List as of 2020  1:16 PM      CONTINUE these medications which have CHANGED    Details   amLODIPine (NORVASC) 10 MG tablet Take 1 tablet by mouth daily, Disp-30 tablet, R-3Normal           Discharge Medication List as of 2020  1:16 PM      CONTINUE these medications which have NOT CHANGED    Details   chlorthalidone (HYGROTON) 25 MG tablet Take 25 mg by mouth dailyHistorical Med      Docusate Sodium 100 MG TABS Take 100 mg by mouth as neededHistorical Med      LIDOCAINE, LIDODERM, 5% PATCH AND REMOVAL 5 %Historical Med      polyethylene glycol (GLYCOLAX) 17 g packet Take 17 g by mouth 2 times dailyHistorical Med      sodium chloride (OCEAN) 0.65 % nasal spray USE 2 SPRAYS IN EACH NOSTRIL TWICE A DAYHistorical Med      tacrolimus (PROTOPIC) 0.03 % ointment APPLY SMALL AMOUNT TOPICALLY TWICE A DAY FOR THICK, SCALING PLAQUES ON BODY OR FACE, Historical Med      ketoconazole (NIZORAL) 2 % shampoo APPLY SHAMPOO TO AFFECTED AREA TOPICALLY MONDAY,WEDNESDAY AND FRIDAY (LATHER, LEAVE ON 5 MINUTES, THEN RINSE) APPLY TO AFFECTED AREAS ON THE SCALP AND FACE., Historical Med      selenium sulfide (SELSUN) 2.5 % lotion APPLY TO SCALP TOPICALLY THREE TIMES A WEEK AS NEEDED LEAVE ON FOR 5 MINUTES BEFORE RINSING, Historical Med      CICLOPIROX-CLOBETASOL-SAL ACID EX APPLY THIN LAYER TOPICALLY TWICE A DAY AS NEEDED TO ARM AND LEG LESIONS (REQUEST ANY REFILLS WELL AHEAD OF TIME; COMPOUNDED PRODUCT)Historical Med      !! lisinopril (PRINIVIL;ZESTRIL) 40 MG tablet TAKE ONE TABLET BY MOUTH ONCE DAILY FOR BLOOD PRESSURE/HEART **NOTE DIRECTIONS**Historical Med      senna (SENOKOT) 8.6 MG tablet Take 1 tablet by mouth 2 times dailyHistorical Med      sildenafil (VIAGRA) 100 MG tablet TAKE ONE TABLET BY MOUTH AS DIRECTED TAKE 1 HOUR BEFORE SEXUAL ACTIVITY.  TAKE ON EMPTY STOMACH (MAIL ONLY)Historical Med      omeprazole 20 MG EC tablet TAKE TWO CAPSULES BY MOUTH ONCE DAILY ON AN EMPTY STOMACHHistorical Med      fluocinonide (LIDEX) 0.05 % ointment APPLY THIN LAYER TOPICALLY TWICE A DAY TO THE SCALP TWICE A DAY AS NEEDED, Historical Med      fluocinolone acetonide (SYNALAR) 0.01 % external solution APPLY SMALL AMOUNT TOPICALLY TWICE A DAY AS NEEDED FOR SCALP CONDITION, Historical Med diclofenac sodium (VOLTAREN) 1 % GEL APPLY THIN FILM TOPICALLY TWICE A DAY AS NEEDED AS INDICATED FOR PAIN (USE PEA-SIZE) SHOWERING/BATHING SHOULD BE AVOID FOR AT LEAST ONE HOUR AFTER THE APPLICATION. [facility] HANDS AFTER USE, Historical Med      vitamin D (CHOLECALCIFEROL) 25 MCG (1000 UT) TABS tablet TAKE TWO TABLETS BY MOUTH ONCE DAILY (VITAMIN D SUPPLEMENT)Historical Med      bisacodyl (DULCOLAX) 10 MG suppository INSERT 1 SUPPOSITORY IN RECTUM EVERY WEEK FOR CONSTIPATIONHistorical Med      ASPIRIN 81 PO TAKE ONE TABLET BY MOUTH ONCE DAILY FOR BLOOD/HEARTHistorical Med      albuterol sulfate  (90 Base) MCG/ACT inhaler INHALE 2 PUFFS BY MOUTH EVERY 6 HOURS AS NEEDED FOR SHORTNESS OF BREATH WAIT AT LEAST 1 MINUTE BETWEEN PUFFSHistorical Med      acetaminophen (TYLENOL) 325 MG tablet TAKE 2 TABLETS BY MOUTH EVERY 6 HOURS AS NEEDED FOR PAIN (MAX 3000MG/DAY ACETAMINOPHEN FROM ALL SOURCES)Historical Med      furosemide (LASIX) 20 MG tablet Take 1 tablet by mouth daily as needed (Take one tablet if body weight increase from baseline by 3-5 lbs.), Disp-30 tablet, R-0Print      pantoprazole (PROTONIX) 40 MG tablet Take 40 mg by mouth daily as needed (before breakfast)Historical Med      cyclobenzaprine (FLEXERIL) 10 MG tablet Take 10 mg by mouth 3 times daily as needed for Muscle spasmsHistorical Med      oxyCODONE-acetaminophen (PERCOCET)  MG per tablet Take 1 tablet by mouth every 4 hours as needed for Pain. Historical Med      !! lisinopril (PRINIVIL;ZESTRIL) 40 MG tablet Take 40 mg by mouth daily Historical Med       !! - Potential duplicate medications found. Please discuss with provider. Discharge Medication List as of 5/28/2020  1:16 PM      STOP taking these medications       atorvastatin (LIPITOR) 40 MG tablet Comments:   Reason for Stopping:               Labs:  For convenience and continuity at follow-up the following most recent labs are provided:    Lab Results   Component Value Date    WBC 7.2 05/28/2020    HGB 13.5 05/28/2020    HCT 41.9 05/28/2020    MCV 81.2 05/28/2020     05/28/2020     05/27/2020    K 4.2 05/27/2020     05/27/2020    CO2 23 05/27/2020    BUN 24 05/27/2020    CREATININE 1.2 05/27/2020    CALCIUM 10.0 05/27/2020    PHOS 3.8 05/04/2018    BNP <15.0 12/23/2018    ALKPHOS 88 05/27/2020    ALT 21 05/27/2020    AST 15 05/27/2020    BILITOT 0.3 05/27/2020    BILIDIR <0.2 06/11/2019    LABALBU 3.8 05/27/2020    LDLCALC 115 04/30/2018    TRIG 114 04/30/2018     Lab Results   Component Value Date    INR 1.03 05/25/2020    INR 1.11 03/22/2019    INR 1.09 12/23/2018       Radiology:  Ct Head Wo Contrast    Result Date: 5/25/2020  EXAMINATION: CT OF THE HEAD WITHOUT CONTRAST 5/25/2020 10:23 pm TECHNIQUE: CT of the head was performed without the administration of intravenous contrast. Dose modulation, iterative reconstruction, and/or weight based adjustment of the mA/kV was utilized to reduce the radiation dose to as low as reasonably achievable. COMPARISON: Head CT 06/11/2019 HISTORY: ORDERING SYSTEM PROVIDED HISTORY: HA, elevated BP TECHNOLOGIST PROVIDED HISTORY: Reason for exam:->HA, elevated BP Has a \"code stroke\" or \"stroke alert\" been called? ->No Reason for Exam: HA, elevated BP Acuity: Acute Type of Exam: Initial Relevant Medical/Surgical History: Headache (Patient presents to ER via [address] [place] [address] EMS with complaints of headache x12 hours. Hx of HTN. ) FINDINGS: BRAIN/VENTRICLES:  No masses nor acute intracranial hemorrhage. Intact gray/white matter differentiation without findings of acute ischemia. No mass effect nor midline shift. Patent basilar cisterns and foramen magnum. No hydrocephalus. Mild diffuse atrophy. ORBITS:  Visualized portions appear normal without acute abnormality. SINUSES:  Visualized portions appear normally pneumatized and aerated. SOFT TISSUES/SKULL:  No acute soft tissue abnormality. No acute fracture. No acute intracranial abnormality. Xr Chest Portable    Result Date: 5/26/2020  EXAMINATION: ONE XRAY VIEW OF THE CHEST 5/25/2020 10:34 pm COMPARISON: 10/27/2019, 06/11/2019 HISTORY: ORDERING SYSTEM PROVIDED HISTORY: cp TECHNOLOGIST PROVIDED HISTORY: Reason for exam:->cp Reason for Exam: Headache; hx of hypertension Acuity: Acute Type of Exam: Initial FINDINGS: A single frontal view of the chest was obtained. A cervical fusion plate is noted. There is chronic bilateral shoulder arthrosis. There is no acute skeletal abnormality. The heart size is enlarged, though stable. The mediastinal contours are stable. The pulmonary vascularity is at the upper limits of normal.  There is stable chronic elevation of the right hemidiaphragm. No focus of acute airspace consolidation is identified. There is no evidence of a pneumothorax or pleural effusion. No acute cardiopulmonary disease. Cta Head Neck W Contrast    Result Date: 5/26/2020  EXAMINATION: CTA OF THE HEAD AND NECK WITH CONTRAST 5/26/2020 2:28 am: TECHNIQUE: CTA of the head and neck was performed with the administration of intravenous contrast. Multiplanar reformatted images are provided for review. MIP images are provided for review. Stenosis of the internal carotid arteries measured using NASCET criteria. Dose modulation, iterative reconstruction, and/or weight based adjustment of the mA/kV was utilized to reduce the radiation dose to as low as reasonably achievable. COMPARISON: Noncontrast CT head done 05/25/2020. HISTORY: ORDERING SYSTEM PROVIDED HISTORY: intractable headache TECHNOLOGIST PROVIDED HISTORY: Reason for exam:->intractable headache Reason for Exam: intractable headache Acuity: Acute Type of Exam: Initial Relevant Medical/Surgical History: Headache (Patient presents to ER via [facility] with complaints of headache x12 hours. Hx of HTN. ) FINDINGS: CTA NECK: AORTIC ARCH/ARCH VESSELS: No dissection or arterial injury.   No significant stenosis of the brachiocephalic or regions appear unremarkable. The normal signal voids within the major intracranial vessels appear maintained. There is volume loss with mild chronic white matter microvascular ischemic change. ORBITS: The visualized portion of the orbits demonstrate no acute abnormality. SINUSES: The visualized paranasal sinuses and mastoid air cells are well aerated. BONES/SOFT TISSUES: The bone marrow signal intensity appears normal. The soft tissues demonstrate no acute abnormality. No acute intracranial abnormality visualized.          Signed:    Claudine Cantu MD   5/30/2020

## 2021-04-15 ENCOUNTER — HOSPITAL ENCOUNTER (INPATIENT)
Age: 66
LOS: 4 days | Discharge: NURSING, REHAB OR LONG-TERM CARE | DRG: 682 | End: 2021-04-19
Attending: EMERGENCY MEDICINE | Admitting: INTERNAL MEDICINE
Payer: OTHER GOVERNMENT

## 2021-04-15 ENCOUNTER — APPOINTMENT (OUTPATIENT)
Dept: GENERAL RADIOLOGY | Age: 66
DRG: 682 | End: 2021-04-15
Payer: OTHER GOVERNMENT

## 2021-04-15 DIAGNOSIS — R09.02 HYPOXIA: Primary | ICD-10-CM

## 2021-04-15 DIAGNOSIS — M54.50 RIGHT-SIDED LOW BACK PAIN WITHOUT SCIATICA, UNSPECIFIED CHRONICITY: ICD-10-CM

## 2021-04-15 DIAGNOSIS — M54.50 CHRONIC RIGHT-SIDED LOW BACK PAIN WITHOUT SCIATICA: ICD-10-CM

## 2021-04-15 DIAGNOSIS — G89.29 CHRONIC RIGHT-SIDED LOW BACK PAIN WITHOUT SCIATICA: ICD-10-CM

## 2021-04-15 DIAGNOSIS — N17.9 ACUTE KIDNEY INJURY (HCC): ICD-10-CM

## 2021-04-15 LAB
A/G RATIO: 1.2 (ref 1.1–2.2)
ALBUMIN SERPL-MCNC: 3.7 G/DL (ref 3.4–5)
ALP BLD-CCNC: 78 U/L (ref 40–129)
ALT SERPL-CCNC: 25 U/L (ref 10–40)
ANION GAP SERPL CALCULATED.3IONS-SCNC: 12 MMOL/L (ref 3–16)
AST SERPL-CCNC: 24 U/L (ref 15–37)
BASE EXCESS VENOUS: 0.3 MMOL/L (ref -2–3)
BASOPHILS ABSOLUTE: 0.2 K/UL (ref 0–0.2)
BASOPHILS RELATIVE PERCENT: 3.5 %
BILIRUB SERPL-MCNC: 0.5 MG/DL (ref 0–1)
BILIRUBIN URINE: NEGATIVE
BLOOD, URINE: NEGATIVE
BUN BLDV-MCNC: 60 MG/DL (ref 7–20)
CALCIUM SERPL-MCNC: 9.4 MG/DL (ref 8.3–10.6)
CARBOXYHEMOGLOBIN: 1.5 % (ref 0–1.5)
CHLORIDE BLD-SCNC: 96 MMOL/L (ref 99–110)
CLARITY: CLEAR
CO2: 25 MMOL/L (ref 21–32)
COLOR: YELLOW
CREAT SERPL-MCNC: 2.7 MG/DL (ref 0.8–1.3)
EKG ATRIAL RATE: 70 BPM
EKG DIAGNOSIS: NORMAL
EKG P AXIS: 37 DEGREES
EKG P-R INTERVAL: 194 MS
EKG Q-T INTERVAL: 406 MS
EKG QRS DURATION: 140 MS
EKG QTC CALCULATION (BAZETT): 438 MS
EKG R AXIS: -15 DEGREES
EKG T AXIS: 25 DEGREES
EKG VENTRICULAR RATE: 70 BPM
EOSINOPHILS ABSOLUTE: 0.1 K/UL (ref 0–0.6)
EOSINOPHILS RELATIVE PERCENT: 1.5 %
GFR AFRICAN AMERICAN: 29
GFR NON-AFRICAN AMERICAN: 24
GLOBULIN: 3.1 G/DL
GLUCOSE BLD-MCNC: 192 MG/DL (ref 70–99)
GLUCOSE URINE: NEGATIVE MG/DL
HCO3 VENOUS: 27.5 MMOL/L (ref 24–28)
HCT VFR BLD CALC: 40.3 % (ref 40.5–52.5)
HEMOGLOBIN, VEN, REDUCED: 29.1 %
HEMOGLOBIN: 13.1 G/DL (ref 13.5–17.5)
KETONES, URINE: NEGATIVE MG/DL
LACTIC ACID: 1.3 MMOL/L (ref 0.4–2)
LEUKOCYTE ESTERASE, URINE: NEGATIVE
LYMPHOCYTES ABSOLUTE: 0.8 K/UL (ref 1–5.1)
LYMPHOCYTES RELATIVE PERCENT: 10.9 %
MCH RBC QN AUTO: 27.4 PG (ref 26–34)
MCHC RBC AUTO-ENTMCNC: 32.4 G/DL (ref 31–36)
MCV RBC AUTO: 84.5 FL (ref 80–100)
METHEMOGLOBIN VENOUS: 0.4 % (ref 0–1.5)
MICROSCOPIC EXAMINATION: NORMAL
MONOCYTES ABSOLUTE: 1.2 K/UL (ref 0–1.3)
MONOCYTES RELATIVE PERCENT: 17.4 %
NEUTROPHILS ABSOLUTE: 4.7 K/UL (ref 1.7–7.7)
NEUTROPHILS RELATIVE PERCENT: 66.7 %
NITRITE, URINE: NEGATIVE
O2 SAT, VEN: 70 %
PCO2, VEN: 54.5 MMHG (ref 41–51)
PDW BLD-RTO: 15.3 % (ref 12.4–15.4)
PH UA: 5.5 (ref 5–8)
PH VENOUS: 7.31 (ref 7.35–7.45)
PLATELET # BLD: 190 K/UL (ref 135–450)
PMV BLD AUTO: 7.4 FL (ref 5–10.5)
PO2, VEN: 39.8 MMHG (ref 25–40)
POTASSIUM REFLEX MAGNESIUM: 4.7 MMOL/L (ref 3.5–5.1)
PRO-BNP: 9 PG/ML (ref 0–124)
PROTEIN UA: NEGATIVE MG/DL
RBC # BLD: 4.77 M/UL (ref 4.2–5.9)
SODIUM BLD-SCNC: 133 MMOL/L (ref 136–145)
SODIUM URINE: 55 MMOL/L
SPECIFIC GRAVITY UA: >=1.03 (ref 1–1.03)
TCO2 CALC VENOUS: 29 MMOL/L
TOTAL PROTEIN: 6.8 G/DL (ref 6.4–8.2)
TROPONIN: <0.01 NG/ML
URINE REFLEX TO CULTURE: NORMAL
URINE TYPE: NORMAL
UROBILINOGEN, URINE: 0.2 E.U./DL
WBC # BLD: 7 K/UL (ref 4–11)

## 2021-04-15 PROCEDURE — 2580000003 HC RX 258: Performed by: STUDENT IN AN ORGANIZED HEALTH CARE EDUCATION/TRAINING PROGRAM

## 2021-04-15 PROCEDURE — 36415 COLL VENOUS BLD VENIPUNCTURE: CPT

## 2021-04-15 PROCEDURE — 51798 US URINE CAPACITY MEASURE: CPT

## 2021-04-15 PROCEDURE — 1200000000 HC SEMI PRIVATE

## 2021-04-15 PROCEDURE — 93005 ELECTROCARDIOGRAM TRACING: CPT | Performed by: STUDENT IN AN ORGANIZED HEALTH CARE EDUCATION/TRAINING PROGRAM

## 2021-04-15 PROCEDURE — 82803 BLOOD GASES ANY COMBINATION: CPT

## 2021-04-15 PROCEDURE — 71045 X-RAY EXAM CHEST 1 VIEW: CPT

## 2021-04-15 PROCEDURE — 82570 ASSAY OF URINE CREATININE: CPT

## 2021-04-15 PROCEDURE — 84300 ASSAY OF URINE SODIUM: CPT

## 2021-04-15 PROCEDURE — 83605 ASSAY OF LACTIC ACID: CPT

## 2021-04-15 PROCEDURE — 2580000003 HC RX 258: Performed by: INTERNAL MEDICINE

## 2021-04-15 PROCEDURE — 94761 N-INVAS EAR/PLS OXIMETRY MLT: CPT

## 2021-04-15 PROCEDURE — 85025 COMPLETE CBC W/AUTO DIFF WBC: CPT

## 2021-04-15 PROCEDURE — 96361 HYDRATE IV INFUSION ADD-ON: CPT

## 2021-04-15 PROCEDURE — 2700000000 HC OXYGEN THERAPY PER DAY

## 2021-04-15 PROCEDURE — 83880 ASSAY OF NATRIURETIC PEPTIDE: CPT

## 2021-04-15 PROCEDURE — 81003 URINALYSIS AUTO W/O SCOPE: CPT

## 2021-04-15 PROCEDURE — 96360 HYDRATION IV INFUSION INIT: CPT

## 2021-04-15 PROCEDURE — 6360000002 HC RX W HCPCS: Performed by: INTERNAL MEDICINE

## 2021-04-15 PROCEDURE — 99284 EMERGENCY DEPT VISIT MOD MDM: CPT

## 2021-04-15 PROCEDURE — 84484 ASSAY OF TROPONIN QUANT: CPT

## 2021-04-15 PROCEDURE — 6370000000 HC RX 637 (ALT 250 FOR IP): Performed by: INTERNAL MEDICINE

## 2021-04-15 PROCEDURE — 80053 COMPREHEN METABOLIC PANEL: CPT

## 2021-04-15 RX ORDER — SODIUM CHLORIDE, SODIUM LACTATE, POTASSIUM CHLORIDE, AND CALCIUM CHLORIDE .6; .31; .03; .02 G/100ML; G/100ML; G/100ML; G/100ML
500 INJECTION, SOLUTION INTRAVENOUS ONCE
Status: COMPLETED | OUTPATIENT
Start: 2021-04-15 | End: 2021-04-15

## 2021-04-15 RX ORDER — POLYETHYLENE GLYCOL 3350 17 G/17G
17 POWDER, FOR SOLUTION ORAL DAILY PRN
Status: DISCONTINUED | OUTPATIENT
Start: 2021-04-15 | End: 2021-04-19 | Stop reason: HOSPADM

## 2021-04-15 RX ORDER — SODIUM CHLORIDE 0.9 % (FLUSH) 0.9 %
5-40 SYRINGE (ML) INJECTION EVERY 12 HOURS SCHEDULED
Status: DISCONTINUED | OUTPATIENT
Start: 2021-04-15 | End: 2021-04-19 | Stop reason: HOSPADM

## 2021-04-15 RX ORDER — SODIUM CHLORIDE 9 MG/ML
INJECTION, SOLUTION INTRAVENOUS CONTINUOUS
Status: DISCONTINUED | OUTPATIENT
Start: 2021-04-15 | End: 2021-04-17

## 2021-04-15 RX ORDER — LIDOCAINE 4 G/G
1 PATCH TOPICAL DAILY PRN
Status: DISCONTINUED | OUTPATIENT
Start: 2021-04-15 | End: 2021-04-19 | Stop reason: HOSPADM

## 2021-04-15 RX ORDER — SODIUM CHLORIDE 9 MG/ML
25 INJECTION, SOLUTION INTRAVENOUS PRN
Status: DISCONTINUED | OUTPATIENT
Start: 2021-04-15 | End: 2021-04-19 | Stop reason: HOSPADM

## 2021-04-15 RX ORDER — ASPIRIN 81 MG/1
81 TABLET ORAL DAILY
Status: DISCONTINUED | OUTPATIENT
Start: 2021-04-16 | End: 2021-04-19 | Stop reason: HOSPADM

## 2021-04-15 RX ORDER — SODIUM CHLORIDE 0.9 % (FLUSH) 0.9 %
5-40 SYRINGE (ML) INJECTION PRN
Status: DISCONTINUED | OUTPATIENT
Start: 2021-04-15 | End: 2021-04-19 | Stop reason: HOSPADM

## 2021-04-15 RX ORDER — ACETAMINOPHEN 325 MG/1
650 TABLET ORAL EVERY 6 HOURS PRN
Status: DISCONTINUED | OUTPATIENT
Start: 2021-04-15 | End: 2021-04-19 | Stop reason: HOSPADM

## 2021-04-15 RX ORDER — ACETAMINOPHEN 650 MG/1
650 SUPPOSITORY RECTAL EVERY 6 HOURS PRN
Status: DISCONTINUED | OUTPATIENT
Start: 2021-04-15 | End: 2021-04-19 | Stop reason: HOSPADM

## 2021-04-15 RX ORDER — DIAZEPAM 10 MG/1
10 TABLET ORAL EVERY 6 HOURS PRN
Status: ON HOLD | COMMUNITY
End: 2021-04-17 | Stop reason: HOSPADM

## 2021-04-15 RX ORDER — SENNA AND DOCUSATE SODIUM 50; 8.6 MG/1; MG/1
2 TABLET, FILM COATED ORAL 2 TIMES DAILY PRN
Status: DISCONTINUED | OUTPATIENT
Start: 2021-04-15 | End: 2021-04-19 | Stop reason: HOSPADM

## 2021-04-15 RX ORDER — VITAMIN E 268 MG
400 CAPSULE ORAL DAILY
COMMUNITY

## 2021-04-15 RX ORDER — PROMETHAZINE HYDROCHLORIDE 25 MG/1
12.5 TABLET ORAL EVERY 6 HOURS PRN
Status: DISCONTINUED | OUTPATIENT
Start: 2021-04-15 | End: 2021-04-19 | Stop reason: HOSPADM

## 2021-04-15 RX ORDER — LIDOCAINE 4 G/G
1 PATCH TOPICAL DAILY PRN
COMMUNITY

## 2021-04-15 RX ORDER — ALBUTEROL SULFATE 90 UG/1
2 AEROSOL, METERED RESPIRATORY (INHALATION) EVERY 6 HOURS PRN
Status: DISCONTINUED | OUTPATIENT
Start: 2021-04-15 | End: 2021-04-19 | Stop reason: HOSPADM

## 2021-04-15 RX ORDER — ONDANSETRON 2 MG/ML
4 INJECTION INTRAMUSCULAR; INTRAVENOUS EVERY 6 HOURS PRN
Status: DISCONTINUED | OUTPATIENT
Start: 2021-04-15 | End: 2021-04-19 | Stop reason: HOSPADM

## 2021-04-15 RX ORDER — OXYCODONE HYDROCHLORIDE 5 MG/1
5-10 TABLET ORAL EVERY 4 HOURS PRN
Status: ON HOLD | COMMUNITY
End: 2021-04-17 | Stop reason: HOSPADM

## 2021-04-15 RX ORDER — OXYCODONE HYDROCHLORIDE 5 MG/1
5 TABLET ORAL EVERY 6 HOURS PRN
Status: DISCONTINUED | OUTPATIENT
Start: 2021-04-15 | End: 2021-04-16

## 2021-04-15 RX ORDER — VITAMIN B COMPLEX
1000 TABLET ORAL DAILY
Status: DISCONTINUED | OUTPATIENT
Start: 2021-04-16 | End: 2021-04-19 | Stop reason: HOSPADM

## 2021-04-15 RX ORDER — TACROLIMUS 1 MG/G
OINTMENT TOPICAL 2 TIMES DAILY PRN
COMMUNITY

## 2021-04-15 RX ORDER — FLUOCINOLONE ACETONIDE 0.25 MG/G
CREAM TOPICAL 2 TIMES DAILY PRN
COMMUNITY

## 2021-04-15 RX ORDER — HEPARIN SODIUM 5000 [USP'U]/ML
5000 INJECTION, SOLUTION INTRAVENOUS; SUBCUTANEOUS EVERY 8 HOURS SCHEDULED
Status: DISCONTINUED | OUTPATIENT
Start: 2021-04-15 | End: 2021-04-19 | Stop reason: HOSPADM

## 2021-04-15 RX ORDER — CYCLOBENZAPRINE HCL 10 MG
10 TABLET ORAL 3 TIMES DAILY PRN
Status: DISCONTINUED | OUTPATIENT
Start: 2021-04-15 | End: 2021-04-15

## 2021-04-15 RX ORDER — AMOXICILLIN 250 MG
1-2 CAPSULE ORAL 2 TIMES DAILY PRN
COMMUNITY

## 2021-04-15 RX ADMIN — SODIUM CHLORIDE: 9 INJECTION, SOLUTION INTRAVENOUS at 16:47

## 2021-04-15 RX ADMIN — HEPARIN SODIUM 5000 UNITS: 5000 INJECTION INTRAVENOUS; SUBCUTANEOUS at 21:40

## 2021-04-15 RX ADMIN — OXYCODONE HYDROCHLORIDE 5 MG: 5 TABLET ORAL at 17:29

## 2021-04-15 RX ADMIN — SODIUM CHLORIDE: 9 INJECTION, SOLUTION INTRAVENOUS at 23:04

## 2021-04-15 RX ADMIN — ACETAMINOPHEN 650 MG: 325 TABLET ORAL at 23:59

## 2021-04-15 RX ADMIN — HEPARIN SODIUM 5000 UNITS: 5000 INJECTION INTRAVENOUS; SUBCUTANEOUS at 16:47

## 2021-04-15 RX ADMIN — SODIUM CHLORIDE, SODIUM LACTATE, POTASSIUM CHLORIDE, AND CALCIUM CHLORIDE 500 ML: .6; .31; .03; .02 INJECTION, SOLUTION INTRAVENOUS at 11:26

## 2021-04-15 ASSESSMENT — ENCOUNTER SYMPTOMS
VOMITING: 0
EYES NEGATIVE: 1
CHEST TIGHTNESS: 0
COUGH: 0
RHINORRHEA: 0
GASTROINTESTINAL NEGATIVE: 1
BACK PAIN: 1
RESPIRATORY NEGATIVE: 1
ABDOMINAL PAIN: 0
NAUSEA: 0
SHORTNESS OF BREATH: 0
TROUBLE SWALLOWING: 0
SORE THROAT: 0

## 2021-04-15 ASSESSMENT — PAIN SCALES - GENERAL
PAINLEVEL_OUTOF10: 9
PAINLEVEL_OUTOF10: 0
PAINLEVEL_OUTOF10: 8
PAINLEVEL_OUTOF10: 0
PAINLEVEL_OUTOF10: 0

## 2021-04-15 ASSESSMENT — PAIN DESCRIPTION - LOCATION: LOCATION: BACK;HIP

## 2021-04-15 ASSESSMENT — PAIN DESCRIPTION - ORIENTATION: ORIENTATION: RIGHT

## 2021-04-15 ASSESSMENT — PAIN DESCRIPTION - DESCRIPTORS: DESCRIPTORS: ACHING;THROBBING

## 2021-04-15 NOTE — ED NOTES
Report called to RN  On 6S. Pt will be transported to room 6304.       Adelita Eng RN  04/15/21 [id number]

## 2021-04-15 NOTE — H&P
Hospital Medicine History & Physical      PCP: [facility]    Date of Admission: 4/15/2021    Date of Service: Pt seen/examined on 04/15/21 and Admitted to Inpatient with expected LOS greater than two midnights due to medical therapy. Chief Complaint:  Brought to ED for AMS at facility    History Of Present Illness:      Guillermo Brumfield is a 60 y.o. male with past medical history as below, including HTN, HLD, sarcoidosis, rectal bleeding, back pain with recent admit, given back brace and dc'd to facility, who presents with AMS per his facility. Per notes and discussion with ED his nurse at facility gave him his medications including oxycodone and came back to check on him and found him to be lethargic and hypoxic on room air. Blood sugar was reported to be normal. Patient AMS resolved by the time he reached the ED. Patient states he has been feeling well other than back pain, but is very tired, says he was awake very early this morning and believes this was the cause. No fevers, chills, chest pain, sob, cough, abdominal pain, n/v, diarrhea or constipation. He says he pees all the time but has sense of incomplete void. No urgency. No dysuria. No recent NSAIDs. He has had poor appetite lately.       Past Medical History:          Diagnosis Date    DJD (degenerative joint disease)     HYPERCHOLESTERAEMIA     Hypertension     Sarcoidosis     Spinal cord injuries      FALL       Past Surgical History:          Procedure Laterality Date    BACK SURGERY      COLONOSCOPY  12/24/2018    COLONOSCOPY POLYPECTOMY SNARE/COLD BIOPSY performed by [facility] at [facility]    OTHER SURGICAL HISTORY  05/01/2018    [facility]    UPPER GASTROINTESTINAL ENDOSCOPY  12/24/2018    COLONOSCOPY SUBMUCOSAL/BOTOX INJECTION performed by Justina Arora MD at [facility] ENDOSCOPY       Medications Prior to Admission:      Prior to Admission [methocarbamol]    Social History:      The patient currently lives in private residence. TOBACCO:   reports that he quit smoking about 6 years ago. He has never used smokeless tobacco.  ETOH:   reports no history of alcohol use. Family History:      Reviewed in detail and positive as follows:    History reviewed. No pertinent family history. REVIEW OF SYSTEMS:   Pertinent positives as noted in the HPI. All other systems reviewed and negative. PHYSICAL EXAM:    BP 98/64   Pulse 71   Temp 98.2 °F (36.8 °C) (Oral)   Resp 14   Ht 5' 11\" (1.803 m)   Wt 248 lb (112.5 kg)   SpO2 99%   BMI 34.59 kg/m²     General appearance: No apparent distress, appears stated age and cooperative. Obese. HEENT: Normal cephalic, atraumatic without obvious deformity. Pupils equal, round, and reactive to light. Extra ocular muscles intact. Conjunctivae/corneas clear. Neck: Supple, with full range of motion. No jugular venous distention. Trachea midline. Respiratory:  Normal respiratory effort. Clear to auscultation, bilaterally without Rales/Wheezes/Rhonchi. Cardiovascular: Regular rate and rhythm with normal S1/S2 without murmurs, rubs or gallops. Abdomen: Soft, non-tender, non-distended with normal bowel sounds. Musculoskelatal: No clubbing, cyanosis or edema bilaterally. Full range of motion without deformity. Skin: Skin color, texture, turgor normal.  No rashes or lesions. Neurologic:  Neurovascularly intact without any focal sensory/motor deficits.  Cranial nerves: II-XII intact, grossly non-focal.  Psychiatric: Alert and oriented, thought content appropriate, normal insight    Labs:     Recent Labs     04/15/21  1122   WBC 7.0   HGB 13.1*   HCT 40.3*        Recent Labs     04/15/21  1122   *   K 4.7   CL 96*   CO2 25   BUN 60*   CREATININE 2.7*   CALCIUM 9.4     Recent Labs     04/15/21  1122   AST 24   ALT 25   BILITOT 0.5   ALKPHOS 78     No results for input(s): INR in the last 72 hours. Recent Labs     04/15/21  1122   TROPONINI <0.01       Urinalysis:      Lab Results   Component Value Date    NITRU Negative 04/15/2021    WBCUA 0-2 03/22/2019    RBCUA 3-5 03/22/2019    BLOODU Negative 04/15/2021    SPECGRAV >=1.030 04/15/2021    GLUCOSEU Negative 04/15/2021       Studies:  XR CHEST PORTABLE   Final Result   Impression: Stable cardiomegaly. ASSESSMENT:    Active Hospital Problems    Diagnosis Date Noted    HARRY (acute kidney injury) ([facility] [address] [N17.9] 04/15/2021       PLAN:    HARRY, appears prerenal  UA - appears dehydrated  Urine lytes  PVR, monitor for retention  Strict Is/Os  Normal saline  Significant increase from baseline, will consult nephrology     Brief episode of AMS noted at the facility, may be due to respiratory depression with hypoxia  Likely due to polypharmacy in the setting of HARRY  Decrease narcotics  Hold benzodiazepines    Hypoxia at facility, in Ed 94% on room air  CXR no acute abnormalities, patient denies symptoms currently  Monitor for further O2 requirement  May have sleep apnea  Wean O2 while awake    DVT Prophylaxis: heparin  Diet: No diet orders on file  Code Status: Prior    PT/OT Eval Status: Will order    [phone] [phone] for the opportunity to be involved in this patient's care. If you have any questions or concerns please feel free to contact me at [employer]serve.

## 2021-04-15 NOTE — CARE COORDINATION
Patient admitted after coming to [facility] ED. Dr. Scooby Iyer note: Javi Flores is a 60 y.o. male with past medical history as below, including HTN, HLD, sarcoidosis, rectal bleeding, back pain with recent admit, given back brace and dc'd to facility, who presents with AMS per his facility. Per notes and discussion with ED his nurse at facility gave him his medications including oxycodone and came back to check on him and found him to be lethargic and hypoxic on room air. Blood sugar was reported to be normal. Patient AMS resolved by the time he reached the ED. \"  Talked to patient. He was in [facility] for rehab and SN. He normally has home with his 80year old mother living with him and another family member lives in lower level of home. He has spinal issues/hx spinal fusion and has been to [address] [place] [address] for those issues. He is veruy tired and states he has been up since 2:30 a.m. This CM stated to patient that CM will interview him in the morning and let him rest. CM will continue to follow patient until discharge.   Electronically signed by Saige Cota RN on 4/15/2021 at 5:58 PM

## 2021-04-15 NOTE — ED NOTES
Attempted to call report to RN  On 6S. The charge nurse has not assigned nurses to new pt's yet.      Duarte Aguayo RN  04/15/21 0139

## 2021-04-15 NOTE — ED TRIAGE NOTES
Pt comes to ED from SNF due to \"Shortness of Breath\". Per EMS report, pt O2 sat was in the 60's per facility, on arrival, pt O2 sats in 90's and remained in 90's en route. Pt denies any shortness of breath or chest pain at this time and remains at 94% on RA. Pt does complain about back and R hip pain.

## 2021-04-15 NOTE — ACP (ADVANCE CARE PLANNING)
Pt has had COVID vaccinations x 2. The last was on 3/27/2021. A paper copy of the card is with his paperwork. Notified the nursing supervisor.

## 2021-04-15 NOTE — ED NOTES
Home Med List is complete. Source of medications in list is [facility] medication list.     Await callback from RN at [facility] in order to find out when last doses were taken.       Wilmer Carter PharmD., BCPS   4/15/2021 1:27 PM  Wireless: [phone]

## 2021-04-15 NOTE — ED PROVIDER NOTES
ED Attending Attestation Note     Date of evaluation: 4/15/2021    This patient was seen by the resident. I have seen and examined the patient, agree with the workup, evaluation, management and diagnosis. The care plan has been discussed. I have reviewed the ECG and concur with the resident's interpretation. My assessment reveals patient sent in from nursing home for an episode where he was less responsive this morning after receiving pain medications. Patient does not recall but believes he was tired from not sleeping the night before. He was found to have a new HARRY and will be brought into the hospital for further management.        Marj Larios MD  04/15/21 [id number]

## 2021-04-15 NOTE — CONSULTS
Nephrology Consult Note                                                                                                                                                                                                                                                                                                                                                               Office : [phone]     Fax :[phone]              Patient's Name: Jesica Greene    Reason for Consult: HARRY   Requesting Physician:  [facility]      Chief Complaint:  Hypotension     History of Present Ilness:    Jesica Greene is a 60 y.o. male with past medical history as below, including HTN, HLD, sarcoidosis, rectal bleeding, back pain with recent admit, given back brace and dc'd to facility, who presents with AMS per his facility. Per notes and discussion with ED his nurse at facility gave him his medications including oxycodone and came back to check on him and found him to be lethargic and hypoxic on room air. Blood sugar was reported to be normal. Patient AMS resolved by the time he reached the ED.       Past Medical History:   Diagnosis Date    DJD (degenerative joint disease)     HYPERCHOLESTERAEMIA     Hypertension     Sarcoidosis     Spinal cord injuries      FALL       Past Surgical History:   Procedure Laterality Date    BACK SURGERY      COLONOSCOPY  12/24/2018    COLONOSCOPY POLYPECTOMY SNARE/COLD BIOPSY performed [facility]in Robertson [facility] at [facility]    OTHER SURGICAL HISTORY  05/01/2018    [facility]    UPPER GASTROINTESTINAL ENDOSCOPY  12/24/2018    COLONOSCOPY SUBMUCOSAL/BOTOX INJECTION performed by Yin Robertson MD at [address] [place] [address] reviewed. No pertinent family history. reports that he quit smoking about 6 years ago.  He has never used smokeless tobacco. He reports that he does not drink alcohol or use drugs.     Allergies:  Baclofen and Robaxin [methocarbamol]    Current Medications:    0.9 % sodium chloride infusion, Continuous  aspirin EC tablet 81 mg, Daily  albuterol sulfate  (90 Base) MCG/ACT inhaler 2 puff, Q6H PRN  dextran 70-hypromellose (TEARS NATURALE) 0.1-0.3 % opthalmic solution 1 drop, 4x Daily PRN  lidocaine 4 % external patch 1 patch, Daily PRN  oxyCODONE (ROXICODONE) immediate release tablet 5 mg, Q6H PRN  sennosides-docusate sodium (SENOKOT-S) 8.6-50 MG tablet 2 tablet, BID PRN  vitamin D (CHOLECALCIFEROL) tablet 1,000 Units, Daily  sodium chloride flush 0.9 % injection 5-40 mL, 2 times per day  sodium chloride flush 0.9 % injection 5-40 mL, PRN  0.9 % sodium chloride infusion, PRN  promethazine (PHENERGAN) tablet 12.5 mg, Q6H PRN    Or  ondansetron (ZOFRAN) injection 4 mg, Q6H PRN  polyethylene glycol (GLYCOLAX) packet 17 g, Daily PRN  acetaminophen (TYLENOL) tablet 650 mg, Q6H PRN    Or  acetaminophen (TYLENOL) suppository 650 mg, Q6H PRN  heparin (porcine) injection 5,000 Units, 3 times per day        Review of Systems:   14 point ROS obtained but were negative except mentioned in HPI      Physical exam:     Vitals:  /70   Pulse 74   Temp 97.9 °F (36.6 °C) (Oral)   Resp 22   Ht 5' 11\" (1.803 m)   Wt 258 lb 9.6 oz (117.3 kg)   SpO2 94%   BMI 36.07 kg/m²   Constitutional:  OAA X3 NAD  Skin: no rash, turgor wnl  Heent:  eomi, mmm  Neck: no bruits or jvd noted  Cardiovascular:  S1, S2 without m/r/g  Respiratory: CTA B without w/r/r  Abdomen:  +bs, soft, nt, nd  Ext: + lower extremity edema  Psychiatric: mood and affect appropriate  Musculoskeletal:  Rom, muscular strength intact    Data:   Labs:  CBC:   Recent Labs     04/15/21  1122 04/16/21  0623   WBC 7.0 6.6   HGB 13.1* 13.7    180     BMP:    Recent Labs     04/15/21  1122 04/16/21  0623   * 136   K 4.7 4.8   CL 96* 101   CO2 25 25   BUN 60* 45*   CREATININE 2.7* 1.6*   GLUCOSE 192* 153*     Ca/Mg/Phos: Recent Labs     04/15/21  1122 04/16/21  0623   CALCIUM 9.4 9.6   PHOS  --  3.0     Hepatic:   Recent Labs     04/15/21  1122   AST 24   ALT 25   BILITOT 0.5   ALKPHOS 78     Troponin:   Recent Labs     04/15/21  1122   TROPONINI <0.01     BNP: No results for input(s): BNP in the last 72 hours. Lipids: No results for input(s): CHOL, TRIG, HDL, LDLCALC, LABVLDL in the last 72 hours. ABGs: No results for input(s): PHART, PO2ART, THC7IWK in the last 72 hours. INR: No results for input(s): INR in the last 72 hours. UA:  Recent Labs     04/15/21  1132   COLORU Yellow   CLARITYU Clear   GLUCOSEU Negative   BILIRUBINUR Negative   KETUA Negative   SPECGRAV >=1.030   BLOODU Negative   PHUR 5.5   PROTEINU Negative   UROBILINOGEN 0.2   NITRU Negative   LEUKOCYTESUR Negative   LABMICR Not Indicated   URINETYPE Other      Urine Microscopic: No results for input(s): LABCAST, BACTERIA, COMU, HYALCAST, WBCUA, RBCUA, EPIU in the last 72 hours. Urine Culture: No results for input(s): LABURIN in the last 72 hours. Urine Chemistry:   Recent Labs     04/15/21  1305   LABCREA 231.3   NAUR 55             IMAGING:  XR CHEST PORTABLE   Final Result   Impression: Stable cardiomegaly. Assessment/Plan   1. Lila     2. HTN    3. Anemia    4. Acid- base/ Electrolyte imbalance     5.  Sarcoidosis     Plan   - LILA sec to hypovolemia   - IVF   - Ur studies  - hold ACE and thiazide for now   - encourage po intake   - r/o infection     Recommend to dose adjust all medications  based on renal functions  Maintain SBP> 90 mmHg   Daily weights   AVOID NSAIDs  Avoid Nephrotoxins  Monitor Intake/Output  Call if significant decrease in urine output                 Thank you for allowing us to participate in care of [address] [place] [address] free to contact me   [facility] [address]  Office : [phone]  Fax :[phone]

## 2021-04-15 NOTE — PROGRESS NOTES
4 Eyes Skin Assessment     The patient is being assess for   Admission    I agree that 2 RN's have performed a thorough Head to Toe Skin Assessment on the patient. ALL assessment sites listed below have been assessed. Areas assessed by both nurses:   [x]   Head, Face, and Ears   [x]   Shoulders, Back, and Chest, Abdomen  [x]   Arms, Elbows, and Hands   [x]   Coccyx, Sacrum, and Ischium  [x]   Legs, Feet, and Heels      **SHARE this note so that the co-signing nurse is able to place an eSignature**    Co-signer eSignature: Electronically signed by Faraz Gupta RN on 4/16/21 at 5:19 AM EDT    Does the Patient have Skin Breakdown?   No          Cesar Prevention initiated:  No   Wound Care Orders initiated:  No      [facility] nurse consulted for Pressure Injury (Stage 3,4, Unstageable, DTI, NWPT, Complex wounds)and New or Established Ostomies:  No      Primary Nurse eSignature: Electronically signed by Rebeka Lagos RN on 4/15/21 at 4:33 PM EDT

## 2021-04-15 NOTE — ED NOTES
Pt's SPO2 dropping to mid 80's on room air. O2 3L per NC applied. SPO2 up to mid 90's.       Gerson Lopez RN  04/15/21 [id number]

## 2021-04-15 NOTE — PROGRESS NOTES
RESPIRATORY THERAPY ASSESSMENT    Name:  Chris Mendes  Medical Record Number:  [id number]  Age: 60 y.o. Gender: male  : 1955  Today's Date:  4/15/2021  Room:  [address] [place] [address]-    Assessment     Is the patient being admitted for a COPD or Asthma exacerbation? No   (If yes the patient will be seen every 4 hours for the first 24 hours and then reassessed)    Patient Admission Diagnosis      Allergies  Allergies   Allergen Reactions    Baclofen      Messes with my urine    Robaxin [Methocarbamol]      Irritates my stomach             Pulmonary History: Former Smoker  Home Oxygen Therapy:  room air  Home Respiratory Therapy:Albuterol   Current Respiratory Therapy:  Albuterol MDI PRN          Respiratory Severity Index(RSI)   Patients with orders for inhalation medications, oxygen, or any therapeutic treatment modality will be placed on Respiratory Protocol. They will be assessed with the first treatment and at least every 72 hours thereafter. The following severity scale will be used to determine frequency of treatment intervention.     Smoking History: Smoking History Less than 1ppd or less than 15 pack year = 1    Social History  Social History     Tobacco Use    Smoking status: Former Smoker     Quit date:      Years since quittin.2    Smokeless tobacco: Never Used    Tobacco comment: 3 cig   Substance Use Topics    Alcohol use: No    Drug use: No       Recent Surgical History: None = 0  Past Surgical History  Past Surgical History:   Procedure Laterality Date    BACK SURGERY      COLONOSCOPY  2018    COLONOSCOPY POLYPECTOMY SNARE/COLD BIOPSY performed [facility] at [facility]    OTHER SURGICAL HISTORY  2018    [facility]    UPPER GASTROINTESTINAL ENDOSCOPY  2018    COLONOSCOPY SUBMUCOSAL/BOTOX INJECTION performed by Pavan [facility]ador MD at [facility] ENDOSCOPY       Level of Consciousness: Alert, Oriented, and Cooperative = 0    Level of Activity: Walking with assistance = 1    Respiratory Pattern: Regular Pattern; RR 8-20 = 0    Breath Sounds: Clear = 0    Sputum   ,  ,    Cough: Strong, spontaneous, non-productive = 0    Vital Signs   BP (!) 133/92   Pulse 85   Temp 98.2 °F (36.8 °C) (Oral)   Resp 16   Ht 5' 11\" (1.803 m)   Wt 248 lb (112.5 kg)   SpO2 97%   BMI 34.59 kg/m²   SPO2 (COPD values may differ): 90-91% on room air or greater than 92% on FiO2 24- 28% = 1    Peak Flow (asthma only): not applicable = 0    RSI: 0-4 = See once and convert to home regimen or discontinue        Plan       Goals: medication delivery and improve oxygenation    Patient/caregiver was educated on the proper method of use for Respiratory Care Devices:  Yes      Level of patient/caregiver understanding able to:   ? Verbalize understanding   ? Demonstrate understanding       ? Teach back        ? Needs reinforcement       ? No available caregiver               ? Other:     Response to education:  Good     Is patient being placed on Home Treatment Regimen? No     Does the patient have everything they need prior to discharge? NA     Comments: Clear BS, on 1 L, refused tx    Plan of Care: Albuterol MDI prn    Electronically signed by Asia Fan RCP on 4/15/2021 at 4:49 PM    Respiratory Protocol Guidelines     1. Assessment and treatment by Respiratory Therapy will be initiated for medication and therapeutic interventions upon initiation of aerosolized medication. 2. Physician will be contacted for respiratory rate (RR) greater than 35 breaths per minute. Therapy will be held for heart rate (HR) greater than 140 beats per minute, pending direction from physician. 3. Bronchodilators will be administered via Metered Dose Inhaler (MDI) with spacer when the following criteria are met:  a.  Alert and cooperative     b. HR < 140 bpm  c. RR < 30 bpm                d. Can demonstrate a 2-3 second inspiratory hold  4. Bronchodilators will be administered via Hand Held Nebulizer CARMEL[facility]) to patients when ANY of the following criteria are met  a. Incognizant or uncooperative          b. Patients treated with HHN at Home        c. Unable to demonstrate proper use of MDI with spacer     d. RR > 30 bpm   5. Bronchodilators will be delivered via Metered Dose Inhaler (MDI), HHN, Aerogen to intubated patients on mechanical ventilation. 6. Inhalation medication orders will be delivered and/or substituted as outlined below. Aerosolized Medications Ordering and Administration Guidelines:    1. All Medications will be ordered by a physician, and their frequency and/or modality will be adjusted as defined by the patients Respiratory Severity Index (RSI) score. 2. If the patient does not have documented COPD, consider discontinuing anticholinergics when RSI is less than 9.  3. If the bronchospasm worsens (increased RSI), then the bronchodilator frequency can be increased to a maximum of every 4 hours. If greater than every 4 hours is required, the physician will be contacted. 4. If the bronchospasm improves, the frequency of the bronchodilator can be decreased, based on the patient's RSI, but not less than home treatment regimen frequency. 5. Bronchodilator(s) will be discontinued if patient has a RSI less than 9 and has received no scheduled or as needed treatment for 72  Hrs. Patients Ordered on a Mucolytic Agent:    1. Must always be administered with a bronchodilator. 2. Discontinue if patient experiences worsened bronchospasm, or secretions have lessened to the point that the patient is able to clear them with a cough. Anti-inflammatory and Combination Medications:    1. If the patient lacks prior history of lung disease, is not using inhaled anti-inflammatory medication at home, and lacks wheezing by examination or by history for at least 24 hours, contact physician for possible discontinuation.

## 2021-04-15 NOTE — PROGRESS NOTES
Patient  Admitted to [place] [address] room 5336. Vital signs and assessment are stable at the time of arrival.  Patient was oriented to room and call light. Skin check performed by two nurses.   Patient has no complaints at time of arrival.

## 2021-04-15 NOTE — ED PROVIDER NOTES
[address] [facility]          EM RESIDENT NOTE     Date of evaluation: 4/15/2021    Chief Complaint     Shortness of Breath and Back Pain    History of Present Illness     Byron Chandler is a 60 y.o. male with a past medical history of hypertension, hyperlipidemia, sarcoidosis, previous episodes of rectal bleeding, longstanding history of back pain with recent acute exacerbation who presents with shortness of breath. Patient brought in from his SNF after he reportedly was found to be hypoxic this morning and was complaining of shortness of breath. The patient currently has no recollection of this event and is not complaining of any chest pain, shortness of breath, cough. He states he is in severe pain from his back which is located along his lumbar region and in his right hip. He did have a recent hospital admission at [facility] for the same complaint where an MRI of his lumbar spine was overall reassuring. He denies any recent new medications and did state that he took a 5 mg oxycodone this morning for his pain. I spoke with the nurse at his facility who called EMS. She reports this is the first day that she has met this patient and states that she gave him his morning meds approximately 30 minutes prior to this event where he was noted to be awake, alert and easily interactive. She returned approximately 30 minutes later to take his vitals and found him lethargic, hypoxic to 79% on room air and had a blood pressure of 76/50. He did increased to the 90s on 2 L and by the time paramedics arrived, he had improvement in his mental status. Blood sugar was reportedly normal but she does not recall what the number was. Of note, patient took his lisinopril this morning in addition to his oxycodone but refused his aspirin. Review of Systems     Review of Systems   Constitutional: Negative.   Negative for activity change, appetite change, chills, diaphoresis, fatigue and times daily as needed (dry eyes)      senna-docusate (PERICOLACE) 8.6-50 MG per tablet Take 1-2 tablets by mouth 2 times daily as needed for Constipation      tacrolimus (PROTOPIC) 0.1 % ointment Apply topically 2 times daily as needed (apply topically to scalp if itching from psoriasis) Apply topically      vitamin E 400 UNIT capsule Take 400 Units by mouth daily      vitamin D (CHOLECALCIFEROL) 25 MCG (1000 UT) TABS tablet Take 1,000 Units by mouth daily       aspirin (ASPIRIN 81) 81 MG EC tablet Take 81 mg by mouth daily       albuterol sulfate  (90 Base) MCG/ACT inhaler Inhale 2 puffs into the lungs every 6 hours as needed for Shortness of Breath       acetaminophen (TYLENOL) 500 MG tablet Take 1,000 mg by mouth every 6 hours as needed (mild pain)              Allergies     He is allergic to baclofen and robaxin [methocarbamol]. Physical Exam     INITIAL VITALS: BP: 103/68, Temp: 98.2 °F (36.8 °C), Pulse: 68, Resp: 15, SpO2: 93 %   Physical Exam  Vitals signs and nursing note reviewed. Constitutional:       General: He is not in acute distress. Appearance: Normal appearance. He is obese. He is not ill-appearing, toxic-appearing or diaphoretic. HENT:      Head: Normocephalic and atraumatic. Mouth/Throat:      Mouth: Mucous membranes are moist.      Pharynx: Oropharynx is clear. Eyes:      Extraocular Movements: Extraocular movements intact. Conjunctiva/sclera: Conjunctivae normal.      Pupils: Pupils are equal, round, and reactive to light. Neck:      Musculoskeletal: Normal range of motion and neck supple. No neck rigidity or muscular tenderness. Cardiovascular:      Rate and Rhythm: Normal rate and regular rhythm. Pulses: Normal pulses. Heart sounds: Normal heart sounds. No murmur. Pulmonary:      Effort: Pulmonary effort is normal. No respiratory distress. Breath sounds: Decreased breath sounds (due to body habitus) present. No wheezing, rhonchi or rales. Chest:      Chest wall: No tenderness. Abdominal:      General: Abdomen is flat. There is no distension. Palpations: Abdomen is soft. Tenderness: There is no abdominal tenderness. There is no guarding. Genitourinary:     Penis: Normal and circumcised. Testes: Normal.      Comments: Mild scrotal swelling bilaterally. Patient noted to be covered in urine during this examination although he reports he has not been incontinent, he merely is having such significant difficulty moving quickly due to his back pain that he has been and unable to toilet hygienically. Musculoskeletal:         General: No swelling, deformity or signs of injury. Lumbar back: He exhibits tenderness and bony tenderness. He exhibits no swelling, no edema, no deformity and no spasm. Right lower leg: He exhibits no tenderness. No edema. Left lower leg: He exhibits no tenderness. No edema. Skin:     General: Skin is warm and dry. Capillary Refill: Capillary refill takes less than 2 seconds. Coloration: Skin is not jaundiced. Findings: No erythema or rash. Comments: Blanching erythema noted to the sacral area with no apparent skin breakdown. Neurological:      General: No focal deficit present. Mental Status: He is alert and oriented to person, place, and time. Mental status is at baseline. Cranial Nerves: No cranial nerve deficit. Sensory: No sensory deficit. Motor: No weakness. Coordination: Coordination normal.      Gait: Gait normal.   Psychiatric:         Mood and Affect: Mood normal.         Behavior: Behavior normal.         Thought Content: Thought content normal.         Judgment: Judgment normal.       DiagnosticResults patient     EKG   Interpreted in conjunction with emergencydepartment physician No att. providers found  Rhythm: normal sinus   Rate: normal  Axis: normal  Ectopy: none  Conduction: right bundle branch block (complete)  ST Segments:  There is ST segment depressions noted in V2 with elevations in inferolateral leads which are notably unchanged compared to multiple priors  Shannan Bateman in  III  Q Waves: none  Clinical Impression: no acute changes  Comparison:  Multiple priors    RADIOLOGY:  XR CHEST PORTABLE   Final Result   Impression: Stable cardiomegaly.           LABS:   Results for orders placed or performed during the hospital encounter of 04/15/21   Comprehensive Metabolic Panel w/ Reflex to MG   Result Value Ref Range    Sodium 133 (L) 136 - 145 mmol/L    Potassium reflex Magnesium 4.7 3.5 - 5.1 mmol/L    Chloride 96 (L) 99 - 110 mmol/L    CO2 25 21 - 32 mmol/L    Anion Gap 12 3 - 16    Glucose 192 (H) 70 - 99 mg/dL    BUN 60 (H) 7 - 20 mg/dL    CREATININE 2.7 (H) 0.8 - 1.3 mg/dL    GFR Non-African American 24 (A) >60    GFR  29 (A) >60    Calcium 9.4 8.3 - 10.6 mg/dL    Total Protein 6.8 6.4 - 8.2 g/dL    Albumin 3.7 3.4 - 5.0 g/dL    Albumin/Globulin Ratio 1.2 1.1 - 2.2    Total Bilirubin 0.5 0.0 - 1.0 mg/dL    Alkaline Phosphatase 78 40 - 129 U/L    ALT 25 10 - 40 U/L    AST 24 15 - 37 U/L    Globulin 3.1 g/dL   CBC Auto Differential   Result Value Ref Range    WBC 7.0 4.0 - 11.0 K/uL    RBC 4.77 4.20 - 5.90 M/uL    Hemoglobin 13.1 (L) 13.5 - 17.5 g/dL    Hematocrit 40.3 (L) 40.5 - 52.5 %    MCV 84.5 80.0 - 100.0 fL    MCH 27.4 26.0 - 34.0 pg    MCHC 32.4 31.0 - 36.0 g/dL    RDW 15.3 12.4 - 15.4 %    Platelets 416 379 - 592 K/uL    MPV 7.4 5.0 - 10.5 fL    Neutrophils % 66.7 %    Lymphocytes % 10.9 %    Monocytes % 17.4 %    Eosinophils % 1.5 %    Basophils % 3.5 %    Neutrophils Absolute 4.7 1.7 - 7.7 K/uL    Lymphocytes Absolute 0.8 (L) 1.0 - 5.1 K/uL    Monocytes Absolute 1.2 0.0 - 1.3 K/uL    Eosinophils Absolute 0.1 0.0 - 0.6 K/uL    Basophils Absolute 0.2 0.0 - 0.2 K/uL   Brain Natriuretic Peptide   Result Value Ref Range    Pro-BNP 9 0 - 124 pg/mL   Troponin   Result Value Ref Range    Troponin <0.01 <0.01 ng/mL Blood Gas, Venous   Result Value Ref Range    pH, Cheng 7.312 (L) 7.350 - 7.450    pCO2, Cheng 54.5 (H) 41.0 - 51.0 mmHg    pO2, Cheng 39.8 25 - 40 mmHg    HCO3, Venous 27.5 24.0 - 28.0 mmol/L    Base Excess, Cheng 0.3 -2.0 - 3.0 mmol/L    O2 Sat, Cheng 70 Not established %    Carboxyhemoglobin 1.5 0.0 - 1.5 %    MetHgb, Cheng 0.4 0.0 - 1.5 %    TC02 (Calc), Cheng 29 mmol/L    Hemoglobin, Cheng, Reduced 29.10 %   Lactic Acid, Plasma   Result Value Ref Range    Lactic Acid 1.3 0.4 - 2.0 mmol/L   Urinalysis Reflex to Culture    Specimen: Urine, clean catch   Result Value Ref Range    Color, UA Yellow Straw/Yellow    Clarity, UA Clear Clear    Glucose, Ur Negative Negative mg/dL    Bilirubin Urine Negative Negative    Ketones, Urine Negative Negative mg/dL    Specific Gravity, UA >=1.030 1.005 - 1.030    Blood, Urine Negative Negative    pH, UA 5.5 5.0 - 8.0    Protein, UA Negative Negative mg/dL    Urobilinogen, Urine 0.2 <2.0 E.U./dL    Nitrite, Urine Negative Negative    Leukocyte Esterase, Urine Negative Negative    Microscopic Examination Not Indicated     Urine Type Other     Urine Reflex to Culture Not Indicated    Sodium, urine, random   Result Value Ref Range    Sodium, Ur 55 Not Established mmol/L   Creatinine, urine, random   Result Value Ref Range    Creatinine, Ur 231.3 39.0 - 259.0 mg/dL   Renal function panel   Result Value Ref Range    Sodium 136 136 - 145 mmol/L    Potassium 4.8 3.5 - 5.1 mmol/L    Chloride 101 99 - 110 mmol/L    CO2 25 21 - 32 mmol/L    Anion Gap 10 3 - 16    Glucose 153 (H) 70 - 99 mg/dL    BUN 45 (H) 7 - 20 mg/dL    CREATININE 1.6 (H) 0.8 - 1.3 mg/dL    GFR Non- 44 (A) >60    GFR  53 (A) >60    Calcium 9.6 8.3 - 10.6 mg/dL    Phosphorus 3.0 2.5 - 4.9 mg/dL    Albumin 3.5 3.4 - 5.0 g/dL   CBC auto differential   Result Value Ref Range    WBC 6.6 4.0 - 11.0 K/uL    RBC 5.00 4.20 - 5.90 M/uL    Hemoglobin 13.7 13.5 - 17.5 g/dL    Hematocrit 42.4 40.5 - 52.5 %    MCV 84.8 80.0 - 100.0 fL    MCH 27.3 26.0 - 34.0 pg    MCHC 32.2 31.0 - 36.0 g/dL    RDW 15.5 (H) 12.4 - 15.4 %    Platelets 195 518 - 134 K/uL    MPV 7.8 5.0 - 10.5 fL    Neutrophils % 61.7 %    Lymphocytes % 14.1 %    Monocytes % 21.8 %    Eosinophils % 1.7 %    Basophils % 0.7 %    Neutrophils Absolute 4.1 1.7 - 7.7 K/uL    Lymphocytes Absolute 0.9 (L) 1.0 - 5.1 K/uL    Monocytes Absolute 1.4 (H) 0.0 - 1.3 K/uL    Eosinophils Absolute 0.1 0.0 - 0.6 K/uL    Basophils Absolute 0.0 0.0 - 0.2 K/uL   Renal function panel   Result Value Ref Range    Sodium 136 136 - 145 mmol/L    Potassium 4.5 3.5 - 5.1 mmol/L    Chloride 99 99 - 110 mmol/L    CO2 27 21 - 32 mmol/L    Anion Gap 10 3 - 16    Glucose 188 (H) 70 - 99 mg/dL    BUN 27 (H) 7 - 20 mg/dL    CREATININE 1.1 0.8 - 1.3 mg/dL    GFR Non-African American >60 >60    GFR African American >60 >60    Calcium 9.8 8.3 - 10.6 mg/dL    Phosphorus 2.6 2.5 - 4.9 mg/dL    Albumin 3.6 3.4 - 5.0 g/dL   CBC auto differential   Result Value Ref Range    WBC 4.9 4.0 - 11.0 K/uL    RBC 4.81 4.20 - 5.90 M/uL    Hemoglobin 13.1 (L) 13.5 - 17.5 g/dL    Hematocrit 40.3 (L) 40.5 - 52.5 %    MCV 83.8 80.0 - 100.0 fL    MCH 27.3 26.0 - 34.0 pg    MCHC 32.6 31.0 - 36.0 g/dL    RDW 15.0 12.4 - 15.4 %    Platelets 634 636 - 748 K/uL    MPV 7.7 5.0 - 10.5 fL    Neutrophils % 68.3 %    Lymphocytes % 12.9 %    Monocytes % 15.6 %    Eosinophils % 2.1 %    Basophils % 1.1 %    Neutrophils Absolute 3.4 1.7 - 7.7 K/uL    Lymphocytes Absolute 0.6 (L) 1.0 - 5.1 K/uL    Monocytes Absolute 0.8 0.0 - 1.3 K/uL    Eosinophils Absolute 0.1 0.0 - 0.6 K/uL    Basophils Absolute 0.1 0.0 - 0.2 K/uL   COVID-19   Result Value Ref Range    SARS-CoV-2 Not Detected Not detected   Renal function panel   Result Value Ref Range    Sodium 136 136 - 145 mmol/L    Potassium 4.4 3.5 - 5.1 mmol/L    Chloride 100 99 - 110 mmol/L    CO2 26 21 - 32 mmol/L    Anion Gap 10 3 - 16    Glucose 160 (H) 70 - 99 mg/dL    BUN 21 (H) 7 - 20 who presents with reported episode of SOB with associated hypoxia and hypotension at his SNF this morning although he is currently HDS and without complaints. After discussion with his SNF staff, it appears his episode of concern may have been d/t receiving prescribed oxycodone and therefore d/t polypharmacy however while in the ED, he developed recurrent hypoxia requiring initiation of 2L via NC. He continues to be asymptomatic however. Diagnostic workup notable for elevated Cr from baseline concerning for acute kidney injury but is otherwise unremarkable. PE was considered as etiology of his hypoxia however he is not hemodynamically unstable, feel risk of contrast in the setting of severe HARRY does not currently outweight benefit and will defer to inpatient team. COVID-19 pending. Will plan to admit for further evaluation of his new oxygen requirement and acute kidney injury. This patient was also evaluated by the attending physician. All care plans were discussed and agreed upon. Clinical Impression     1. Hypoxia    2. Right-sided low back pain without sciatica, unspecified chronicity    3. Acute kidney injury ([facility] [address]        Disposition     PATIENT REFERRED TO:  No follow-up provider specified.     DISCHARGE MEDICATIONS:  Current Discharge Medication List      START taking these medications    Details   cyclobenzaprine (FLEXERIL) 5 MG tablet Take 1 tablet by mouth 3 times daily as needed for Muscle spasms (back pain, muscle spasms)  Qty: 9 tablet, Refills: 0           DISPOSITION Admitted 04/15/2021 01:51:41 PM     Efe Neville MD  Resident  04/18/21 [id number]

## 2021-04-15 NOTE — ED NOTES
Bed: B19-19  Expected date:   Expected time:   Means of arrival:   Comments:  Arlin James, RN  04/15/21 [id number]

## 2021-04-16 LAB
ALBUMIN SERPL-MCNC: 3.5 G/DL (ref 3.4–5)
ANION GAP SERPL CALCULATED.3IONS-SCNC: 10 MMOL/L (ref 3–16)
BASOPHILS ABSOLUTE: 0 K/UL (ref 0–0.2)
BASOPHILS RELATIVE PERCENT: 0.7 %
BUN BLDV-MCNC: 45 MG/DL (ref 7–20)
CALCIUM SERPL-MCNC: 9.6 MG/DL (ref 8.3–10.6)
CHLORIDE BLD-SCNC: 101 MMOL/L (ref 99–110)
CO2: 25 MMOL/L (ref 21–32)
CREAT SERPL-MCNC: 1.6 MG/DL (ref 0.8–1.3)
CREATININE URINE: 231.3 MG/DL (ref 39–259)
EOSINOPHILS ABSOLUTE: 0.1 K/UL (ref 0–0.6)
EOSINOPHILS RELATIVE PERCENT: 1.7 %
GFR AFRICAN AMERICAN: 53
GFR NON-AFRICAN AMERICAN: 44
GLUCOSE BLD-MCNC: 153 MG/DL (ref 70–99)
HCT VFR BLD CALC: 42.4 % (ref 40.5–52.5)
HEMOGLOBIN: 13.7 G/DL (ref 13.5–17.5)
LYMPHOCYTES ABSOLUTE: 0.9 K/UL (ref 1–5.1)
LYMPHOCYTES RELATIVE PERCENT: 14.1 %
MCH RBC QN AUTO: 27.3 PG (ref 26–34)
MCHC RBC AUTO-ENTMCNC: 32.2 G/DL (ref 31–36)
MCV RBC AUTO: 84.8 FL (ref 80–100)
MONOCYTES ABSOLUTE: 1.4 K/UL (ref 0–1.3)
MONOCYTES RELATIVE PERCENT: 21.8 %
NEUTROPHILS ABSOLUTE: 4.1 K/UL (ref 1.7–7.7)
NEUTROPHILS RELATIVE PERCENT: 61.7 %
PDW BLD-RTO: 15.5 % (ref 12.4–15.4)
PHOSPHORUS: 3 MG/DL (ref 2.5–4.9)
PLATELET # BLD: 180 K/UL (ref 135–450)
PMV BLD AUTO: 7.8 FL (ref 5–10.5)
POTASSIUM SERPL-SCNC: 4.8 MMOL/L (ref 3.5–5.1)
RBC # BLD: 5 M/UL (ref 4.2–5.9)
SODIUM BLD-SCNC: 136 MMOL/L (ref 136–145)
WBC # BLD: 6.6 K/UL (ref 4–11)

## 2021-04-16 PROCEDURE — 97530 THERAPEUTIC ACTIVITIES: CPT

## 2021-04-16 PROCEDURE — 97161 PT EVAL LOW COMPLEX 20 MIN: CPT

## 2021-04-16 PROCEDURE — U0005 INFEC AGEN DETEC AMPLI PROBE: HCPCS

## 2021-04-16 PROCEDURE — 6370000000 HC RX 637 (ALT 250 FOR IP): Performed by: INTERNAL MEDICINE

## 2021-04-16 PROCEDURE — 85025 COMPLETE CBC W/AUTO DIFF WBC: CPT

## 2021-04-16 PROCEDURE — 36415 COLL VENOUS BLD VENIPUNCTURE: CPT

## 2021-04-16 PROCEDURE — 97166 OT EVAL MOD COMPLEX 45 MIN: CPT

## 2021-04-16 PROCEDURE — 6360000002 HC RX W HCPCS: Performed by: INTERNAL MEDICINE

## 2021-04-16 PROCEDURE — 2580000003 HC RX 258: Performed by: INTERNAL MEDICINE

## 2021-04-16 PROCEDURE — 2060000000 HC ICU INTERMEDIATE R&B

## 2021-04-16 PROCEDURE — 80069 RENAL FUNCTION PANEL: CPT

## 2021-04-16 PROCEDURE — U0003 INFECTIOUS AGENT DETECTION BY NUCLEIC ACID (DNA OR RNA); SEVERE ACUTE RESPIRATORY SYNDROME CORONAVIRUS 2 (SARS-COV-2) (CORONAVIRUS DISEASE [COVID-19]), AMPLIFIED PROBE TECHNIQUE, MAKING USE OF HIGH THROUGHPUT TECHNOLOGIES AS DESCRIBED BY CMS-2020-01-R: HCPCS

## 2021-04-16 RX ORDER — OXYCODONE HYDROCHLORIDE 5 MG/1
10 TABLET ORAL EVERY 6 HOURS PRN
Status: DISCONTINUED | OUTPATIENT
Start: 2021-04-16 | End: 2021-04-18

## 2021-04-16 RX ADMIN — Medication 1000 UNITS: at 10:57

## 2021-04-16 RX ADMIN — Medication 10 ML: at 10:58

## 2021-04-16 RX ADMIN — DOCUSATE SODIUM 50 MG AND SENNOSIDES 8.6 MG 2 TABLET: 8.6; 5 TABLET, FILM COATED ORAL at 10:57

## 2021-04-16 RX ADMIN — SODIUM CHLORIDE: 9 INJECTION, SOLUTION INTRAVENOUS at 13:09

## 2021-04-16 RX ADMIN — HEPARIN SODIUM 5000 UNITS: 5000 INJECTION INTRAVENOUS; SUBCUTANEOUS at 07:02

## 2021-04-16 RX ADMIN — OXYCODONE HYDROCHLORIDE 10 MG: 5 TABLET ORAL at 23:31

## 2021-04-16 RX ADMIN — OXYCODONE HYDROCHLORIDE 5 MG: 5 TABLET ORAL at 13:14

## 2021-04-16 RX ADMIN — ONDANSETRON 4 MG: 2 INJECTION INTRAMUSCULAR; INTRAVENOUS at 10:57

## 2021-04-16 RX ADMIN — OXYCODONE HYDROCHLORIDE 10 MG: 5 TABLET ORAL at 17:21

## 2021-04-16 RX ADMIN — ACETAMINOPHEN 650 MG: 325 TABLET ORAL at 13:14

## 2021-04-16 RX ADMIN — ACETAMINOPHEN 650 MG: 325 TABLET ORAL at 20:25

## 2021-04-16 ASSESSMENT — PAIN DESCRIPTION - ORIENTATION
ORIENTATION: LOWER
ORIENTATION: LOWER

## 2021-04-16 ASSESSMENT — PAIN DESCRIPTION - PROGRESSION
CLINICAL_PROGRESSION: NOT CHANGED
CLINICAL_PROGRESSION: NOT CHANGED

## 2021-04-16 ASSESSMENT — PAIN DESCRIPTION - FREQUENCY
FREQUENCY: CONTINUOUS

## 2021-04-16 ASSESSMENT — PAIN SCALES - GENERAL
PAINLEVEL_OUTOF10: 0
PAINLEVEL_OUTOF10: 0
PAINLEVEL_OUTOF10: 10
PAINLEVEL_OUTOF10: 9
PAINLEVEL_OUTOF10: 8

## 2021-04-16 ASSESSMENT — PAIN DESCRIPTION - DESCRIPTORS: DESCRIPTORS: ACHING;CONSTANT;DISCOMFORT

## 2021-04-16 ASSESSMENT — PAIN DESCRIPTION - PAIN TYPE: TYPE: CHRONIC PAIN

## 2021-04-16 ASSESSMENT — PAIN DESCRIPTION - LOCATION: LOCATION: BACK

## 2021-04-16 NOTE — PLAN OF CARE
Problem: Falls - Risk of:  Goal: Will remain free from falls  Description: Will remain free from falls  Outcome: Ongoing  Goal: Absence of physical injury  Description: Absence of physical injury  Outcome: Ongoing   D: Fever 101.3 tylenol was given, but now afebrile. Had very copious amt of hard formed stool. Remains disoriented to situation and time. Slept most of evening shift and overnight. No c/o pain. Arms and legs very stiff. Only able to move RLE some during turning and reposition. No falls last pm or overnight. Bed alarm, low light, & non skid socks on for safety. A: Cont to monitor during hourly rounds    Problem: Skin Integrity:  Goal: Will show no infection signs and symptoms  Description: Will show no infection signs and symptoms  Outcome: Ongoing  Goal: Absence of new skin breakdown  Description: Absence of new skin breakdown  Outcome: Ongoing     Problem: Pain:  Description: Pain management should include both nonpharmacologic and pharmacologic interventions.   Goal: Pain level will decrease  Description: Pain level will decrease  Outcome: Ongoing  Goal: Control of acute pain  Description: Control of acute pain  Outcome: Ongoing  Goal: Control of chronic pain  Description: Control of chronic pain  Outcome: Ongoing

## 2021-04-16 NOTE — CONSULTS
Nephrology Consult Note                                                                                                                                                                                                                                                                                                                                                               Office : [phone]     Fax :[phone]              Patient's Name: Danis Welch  9:28 AM  4/16/2021    Reason for Consult: HARRY  Requesting Physician:  [facility]      Chief Complaint:  AMS    History of Present Ilness:    Danis Welch is a 60 y.o. male with PMH HTN, HLD, sarcoidosis, back pain, rectal bleeding and recent admission at [facility] (3/31-4/5) for intractable back pain who presented to the ED from nursing facility with AMS. Per notes nurse at facility gave him his medications including oxycodone and came back to check on him and found him to be lethargic and hypoxic on room air. Blood sugar was reported to be normal. Patient AMS resolved by the time he reached the ED. In the ED he was found to have an HARRY with creatinine of 2.7 (baseline 0.9-1.2). Today, creatinine has improved to 1.6. Good UO of 1.4L. Fever of 101.3 overnight that decreased with tylenol. Currently afebrile, VSS. Patient seen at bedside. He expresses frustrations of being in the hospital and concerns about communication of care with his back doctors at the [place]. He states he did have chills this morning, but feels improved. He denies nausea, vomiting, abdominal pain, dysuria, chest pain, sob, or weakness.      Past Medical History:   Diagnosis Date    DJD (degenerative joint disease)     HYPERCHOLESTERAEMIA     Hypertension     Sarcoidosis     Spinal cord injuries      FALL       Past Surgical History:   Procedure Laterality Date    BACK SURGERY      COLONOSCOPY  12/24/2018    COLONOSCOPY POLYPECTOMY SNARE/COLD BIOPSY performed by Ezra Reid MD at [facility]      inguinal    OTHER SURGICAL HISTORY  05/01/2018    [facility]    UPPER GASTROINTESTINAL ENDOSCOPY  12/24/2018    COLONOSCOPY SUBMUCOSAL/BOTOX INJECTION performed by Ezra Reid MD at [address] [place] [address] reviewed. No pertinent family history. reports that he quit smoking about 6 years ago. He has never used smokeless tobacco. He reports that he does not drink alcohol or use drugs.     Allergies:  Baclofen and Robaxin [methocarbamol]    Current Medications:    0.9 % sodium chloride infusion, Continuous  aspirin EC tablet 81 mg, Daily  albuterol sulfate  (90 Base) MCG/ACT inhaler 2 puff, Q6H PRN  dextran 70-hypromellose (TEARS NATURALE) 0.1-0.3 % opthalmic solution 1 drop, 4x Daily PRN  lidocaine 4 % external patch 1 patch, Daily PRN  oxyCODONE (ROXICODONE) immediate release tablet 5 mg, Q6H PRN  sennosides-docusate sodium (SENOKOT-S) 8.6-50 MG tablet 2 tablet, BID PRN  vitamin D (CHOLECALCIFEROL) tablet 1,000 Units, Daily  sodium chloride flush 0.9 % injection 5-40 mL, 2 times per day  sodium chloride flush 0.9 % injection 5-40 mL, PRN  0.9 % sodium chloride infusion, PRN  promethazine (PHENERGAN) tablet 12.5 mg, Q6H PRN    Or  ondansetron (ZOFRAN) injection 4 mg, Q6H PRN  polyethylene glycol (GLYCOLAX) packet 17 g, Daily PRN  acetaminophen (TYLENOL) tablet 650 mg, Q6H PRN    Or  acetaminophen (TYLENOL) suppository 650 mg, Q6H PRN  heparin (porcine) injection 5,000 Units, 3 times per day        Review of Systems:   14 point ROS obtained but were negative except mentioned in HPI      Physical exam:     Vitals:  /70   Pulse 74   Temp 97.9 °F (36.6 °C) (Oral)   Resp 22   Ht 5' 11\" (1.803 m)   Wt 258 lb 9.6 oz (117.3 kg)   SpO2 94%   BMI 36.07 kg/m²   Constitutional:  OAA X3 NAD  Skin: no rash, turgor wnl  Heent:  eomi, mmm  Neck: no bruits or jvd noted  Cardiovascular:  S1, S2 without states he is on diuretic at home, but cannot recall the name    3. Acute hypoxic respiratory failure 2/2 aspiration pneumonitis   -reportedly had low spO2 at nursing facility just after receiving pain medications  - patient denies any sob  -patient had normal spO2 on arrival to the ED   -CXR showed stable cardiomegaly       3.  HTN    Plan  -hold nephrotoxic agents  -continue fluids at 100ml/hr  -strict I/Os  -Monitor electrolytes  -monitor for progression to pneumonia  -hold off on antibiotics for now              Thank you for allowing us to participate in care of Brenda Farrah acted as my scribe     Musa Navarro

## 2021-04-16 NOTE — PROGRESS NOTES
min)  Standing Balance: Unable to assess(comment)(attempted x4; unable to stand from bed.)  ADL  Feeding: Independent; Beverage management  Grooming: Modified independent ;Setup(for simple tasks, may need assist for multi-step tasks)  LE Bathing: Dependent/Total  LE Dressing: Dependent/Total  Toileting: Dependent/Total(catheter in place; pt fixated on being \"wet\" but cath intact, brief and chux dry)        Transfers  Sit to stand: Dependent/Total;2 Person assistance(max assist x2; 4 trials, pt demos difficulty motor planning, after max cueing improved and was able to lift from mattress slightly but not stand.)  Transfer Comments: Hallie Martínez recommended for transfers     Cognition  Overall Cognitive Status: Exceptions  Cognition Comment: alert, oriented to self, place, partial situation; decreased insight, problem solving, initiation, sequencing, memory     UE Strength  UE Strength Comment: generalized weakness bilaterally, grasp 3+/5        Plan  If pt discharges prior to next tx, this note will serve as d/c summary.  Continue per POC if pt does not d/c.     Plan  Times per week: 2-5x  Times per day: Daily  Current Treatment Recommendations: Strengthening, ROM, Balance Training, Functional Mobility Training, Endurance Training, Safety Education & Training, Wheelchair Mobility Training, Self-Care / ADL, Patient/Caregiver Education & Training, Equipment Evaluation, Education, & procurement, Cognitive Reorientation      AM-PAC Score        AM-PAC Inpatient Daily Activity Raw Score: 14 (04/16/21 1223)  AM-PAC Inpatient ADL T-Scale Score : 33.39 (04/16/21 1223)  ADL Inpatient CMS 0-100% Score: 59.67 (04/16/21 1223)  ADL Inpatient CMS G-Code Modifier : CK (04/16/21 1223)      Therapy Time   Individual Concurrent Group Co-treatment   Time In 1105         Time Out 1158         Minutes 53          Timed Code Tx Min: 38  Total Tx Time: [facility], OT

## 2021-04-16 NOTE — PROGRESS NOTES
4 Eyes Admission Assessment     I agree as the admission nurse that 2 RN's have performed a thorough Head to Toe Skin Assessment on the patient. ALL assessment sites listed below have been assessed on admission. Areas assessed by both nurses: Javi Flower  [x]   Head, Face, and Ears   [x]   Shoulders, Back, and Chest  [x]   Arms, Elbows, and Hands   [x]   Coccyx, Sacrum, and Ischium  [x]   Legs, Feet, and Heels        Does the Patient have Skin Breakdown?    Cesar Prevention initiated:  No   Wound Care Orders initiated:  No      [facility] nurse consulted for Pressure Injury (Stage 3,4, Unstageable, DTI, NWPT, and Complex wounds) or Cesar score 18 or lower:  No      Nurse 1 eSignature: Electronically signed by Maddison Colin RN on 4/16/21 at 7:04 PM EDT    **SHARE this note so that the co-signing nurse is able to place an eSignature**    Nurse 2 eSignature: Electronically signed by Enmanuel Garcia RN on 4/16/21 at 7:30 PM EDT
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
## 2021-04-16 NOTE — PROGRESS NOTES
Hospitalist Progress Note      PCP: [facility]    Date of Admission: 4/15/2021    Chief Complaint: Brought to ED for AMS at facility     History Of Present Illness:       Roney Finn is a 60 y.o. male with past medical history as below, including HTN, HLD, sarcoidosis, rectal bleeding, back pain with recent admit, given back brace and dc'd to facility, who presents with AMS per his facility. Per notes and discussion with ED his nurse at facility gave him his medications including oxycodone and came back to check on him and found him to be lethargic and hypoxic on room air. Blood sugar was reported to be normal. Patient AMS resolved by the time he reached the ED. Subjective: Feeling well aside from back pain and generally feeling weak all over but especially his legs. Would like to have his pain medications increased again. Discussed they were reduced due to HARRY. No chest pain, sob, abdominal pain, n/v or diarrhea. Medications:  Reviewed    Infusion Medications    sodium chloride 100 mL/hr at 04/16/21 1309    sodium chloride       Scheduled Medications    aspirin  81 mg Oral Daily    Vitamin D  1,000 Units Oral Daily    sodium chloride flush  5-40 mL Intravenous 2 times per day    heparin (porcine)  5,000 Units Subcutaneous 3 times per day     PRN Meds: oxyCODONE, albuterol sulfate HFA, dextran 70-hypromellose, lidocaine, sennosides-docusate sodium, sodium chloride flush, sodium chloride, promethazine **OR** ondansetron, polyethylene glycol, acetaminophen **OR** acetaminophen      Intake/Output Summary (Last 24 hours) at 4/16/2021 1630  Last data filed at 4/16/2021 1318  Gross per 24 hour   Intake 3449 ml   Output 3400 ml   Net 49 ml       Exam:    /78   Pulse 98   Temp 99.1 °F (37.3 °C) (Oral)   Resp 18   Ht 5' 11\" (1.803 m)   Wt 258 lb 9.6 oz (117.3 kg)   SpO2 97%   BMI 36.07 kg/m²     General appearance: No apparent distress, appears stated age and cooperative.   HEENT: reports feeling well aside from chronic back pain. No further fevers reported  Will monitor for any recurrence and expand infectious workup if needed.     Hypoxia at facility, in Ed 94% on room air  CXR no acute abnormalities, patient denies symptoms currently  Monitor for further O2 requirement  May have sleep apnea  Breathing well on room air    Back Pain LE weakness bilaterally, noted at recent admission  Discharged to SNF for therapy with back brace, outpatient follow-up     Diet: DIET CARDIAC;   Dietary Nutrition Supplements: Frozen Oral Supplement  Code Status: Full Code    PT/OT Eval Status: Ongoing    Dispo - Inpatient, plan to dc when HARRY is resolved, likely in am.    Jory Wang DO

## 2021-04-17 LAB
ALBUMIN SERPL-MCNC: 3.6 G/DL (ref 3.4–5)
ANION GAP SERPL CALCULATED.3IONS-SCNC: 10 MMOL/L (ref 3–16)
BASOPHILS ABSOLUTE: 0.1 K/UL (ref 0–0.2)
BASOPHILS RELATIVE PERCENT: 1.1 %
BUN BLDV-MCNC: 27 MG/DL (ref 7–20)
CALCIUM SERPL-MCNC: 9.8 MG/DL (ref 8.3–10.6)
CHLORIDE BLD-SCNC: 99 MMOL/L (ref 99–110)
CO2: 27 MMOL/L (ref 21–32)
CREAT SERPL-MCNC: 1.1 MG/DL (ref 0.8–1.3)
EOSINOPHILS ABSOLUTE: 0.1 K/UL (ref 0–0.6)
EOSINOPHILS RELATIVE PERCENT: 2.1 %
GFR AFRICAN AMERICAN: >60
GFR NON-AFRICAN AMERICAN: >60
GLUCOSE BLD-MCNC: 188 MG/DL (ref 70–99)
HCT VFR BLD CALC: 40.3 % (ref 40.5–52.5)
HEMOGLOBIN: 13.1 G/DL (ref 13.5–17.5)
LYMPHOCYTES ABSOLUTE: 0.6 K/UL (ref 1–5.1)
LYMPHOCYTES RELATIVE PERCENT: 12.9 %
MCH RBC QN AUTO: 27.3 PG (ref 26–34)
MCHC RBC AUTO-ENTMCNC: 32.6 G/DL (ref 31–36)
MCV RBC AUTO: 83.8 FL (ref 80–100)
MONOCYTES ABSOLUTE: 0.8 K/UL (ref 0–1.3)
MONOCYTES RELATIVE PERCENT: 15.6 %
NEUTROPHILS ABSOLUTE: 3.4 K/UL (ref 1.7–7.7)
NEUTROPHILS RELATIVE PERCENT: 68.3 %
PDW BLD-RTO: 15 % (ref 12.4–15.4)
PHOSPHORUS: 2.6 MG/DL (ref 2.5–4.9)
PLATELET # BLD: 187 K/UL (ref 135–450)
PMV BLD AUTO: 7.7 FL (ref 5–10.5)
POTASSIUM SERPL-SCNC: 4.5 MMOL/L (ref 3.5–5.1)
RBC # BLD: 4.81 M/UL (ref 4.2–5.9)
SARS-COV-2: NOT DETECTED
SODIUM BLD-SCNC: 136 MMOL/L (ref 136–145)
WBC # BLD: 4.9 K/UL (ref 4–11)

## 2021-04-17 PROCEDURE — 2580000003 HC RX 258: Performed by: INTERNAL MEDICINE

## 2021-04-17 PROCEDURE — 80069 RENAL FUNCTION PANEL: CPT

## 2021-04-17 PROCEDURE — 36415 COLL VENOUS BLD VENIPUNCTURE: CPT

## 2021-04-17 PROCEDURE — 6370000000 HC RX 637 (ALT 250 FOR IP): Performed by: INTERNAL MEDICINE

## 2021-04-17 PROCEDURE — 99233 SBSQ HOSP IP/OBS HIGH 50: CPT | Performed by: HOSPITALIST

## 2021-04-17 PROCEDURE — 2060000000 HC ICU INTERMEDIATE R&B

## 2021-04-17 PROCEDURE — 6360000002 HC RX W HCPCS: Performed by: INTERNAL MEDICINE

## 2021-04-17 PROCEDURE — 85025 COMPLETE CBC W/AUTO DIFF WBC: CPT

## 2021-04-17 RX ORDER — CYCLOBENZAPRINE HCL 5 MG
5 TABLET ORAL 3 TIMES DAILY PRN
Qty: 9 TABLET | Refills: 0 | Status: SHIPPED | OUTPATIENT
Start: 2021-04-17 | End: 2021-04-20

## 2021-04-17 RX ORDER — CYCLOBENZAPRINE HCL 10 MG
5 TABLET ORAL 3 TIMES DAILY PRN
Status: DISCONTINUED | OUTPATIENT
Start: 2021-04-17 | End: 2021-04-19 | Stop reason: HOSPADM

## 2021-04-17 RX ORDER — LISINOPRIL 40 MG/1
40 TABLET ORAL DAILY
Qty: 30 TABLET | Refills: 3 | Status: SHIPPED
Start: 2021-04-17

## 2021-04-17 RX ORDER — OXYCODONE HYDROCHLORIDE 10 MG/1
5 TABLET ORAL EVERY 6 HOURS PRN
Qty: 12 TABLET | Refills: 0 | Status: SHIPPED | OUTPATIENT
Start: 2021-04-17 | End: 2021-04-19

## 2021-04-17 RX ORDER — CHLORTHALIDONE 25 MG/1
25 TABLET ORAL DAILY
Qty: 30 TABLET | Refills: 3 | Status: SHIPPED
Start: 2021-04-17

## 2021-04-17 RX ADMIN — ACETAMINOPHEN 650 MG: 325 TABLET ORAL at 12:57

## 2021-04-17 RX ADMIN — HEPARIN SODIUM 5000 UNITS: 5000 INJECTION INTRAVENOUS; SUBCUTANEOUS at 21:16

## 2021-04-17 RX ADMIN — ACETAMINOPHEN 650 MG: 325 TABLET ORAL at 19:07

## 2021-04-17 RX ADMIN — ACETAMINOPHEN 650 MG: 325 TABLET ORAL at 06:36

## 2021-04-17 RX ADMIN — Medication 1000 UNITS: at 08:42

## 2021-04-17 RX ADMIN — OXYCODONE HYDROCHLORIDE 10 MG: 5 TABLET ORAL at 19:07

## 2021-04-17 RX ADMIN — OXYCODONE HYDROCHLORIDE 10 MG: 5 TABLET ORAL at 06:36

## 2021-04-17 RX ADMIN — Medication 10 ML: at 21:21

## 2021-04-17 RX ADMIN — OXYCODONE HYDROCHLORIDE 10 MG: 5 TABLET ORAL at 12:57

## 2021-04-17 RX ADMIN — CYCLOBENZAPRINE 5 MG: 10 TABLET, FILM COATED ORAL at 12:59

## 2021-04-17 ASSESSMENT — PAIN SCALES - GENERAL
PAINLEVEL_OUTOF10: 8
PAINLEVEL_OUTOF10: 7
PAINLEVEL_OUTOF10: 8

## 2021-04-17 ASSESSMENT — PAIN DESCRIPTION - PROGRESSION
CLINICAL_PROGRESSION: NOT CHANGED

## 2021-04-17 ASSESSMENT — PAIN - FUNCTIONAL ASSESSMENT
PAIN_FUNCTIONAL_ASSESSMENT: PREVENTS OR INTERFERES WITH MANY ACTIVE NOT PASSIVE ACTIVITIES
PAIN_FUNCTIONAL_ASSESSMENT: PREVENTS OR INTERFERES WITH MANY ACTIVE NOT PASSIVE ACTIVITIES
PAIN_FUNCTIONAL_ASSESSMENT: PREVENTS OR INTERFERES SOME ACTIVE ACTIVITIES AND ADLS

## 2021-04-17 ASSESSMENT — PAIN DESCRIPTION - FREQUENCY
FREQUENCY: CONTINUOUS

## 2021-04-17 ASSESSMENT — PAIN DESCRIPTION - PAIN TYPE
TYPE: CHRONIC PAIN
TYPE: CHRONIC PAIN

## 2021-04-17 ASSESSMENT — PAIN DESCRIPTION - ORIENTATION
ORIENTATION: LOWER

## 2021-04-17 ASSESSMENT — PAIN DESCRIPTION - ONSET
ONSET: ON-GOING

## 2021-04-17 ASSESSMENT — PAIN DESCRIPTION - DESCRIPTORS
DESCRIPTORS: ACHING;CONSTANT;DISCOMFORT
DESCRIPTORS: ACHING;CONSTANT

## 2021-04-17 NOTE — PROGRESS NOTES
Nephrology  Note                                                                                                                                                                                                                                                                                                                                                               Office : [phone]     Fax :[phone]              Patient's Name: Steve Antunez  8:45 AM  4/17/2021    Reason for Consult: HARRY  Requesting Physician:  [facility]      Chief Complaint:  AMS    History of Present Ilness:    Steve Antunez is a 60 y.o. male with PMH HTN, HLD, sarcoidosis, back pain, rectal bleeding and recent admission at [facility] (3/31-4/5) for intractable back pain who presented to the ED from nursing facility with AMS. Per notes nurse at facility gave him his medications including oxycodone and came back to check on him and found him to be lethargic and hypoxic on room air. Blood sugar was reported to be normal. Patient AMS resolved by the time he reached the ED. In the ED he was found to have an HARRY with creatinine of 2.7 (baseline 0.9-1.2). INTERVAL HISTORY    Feels same  Shortness of breath: No   UOP: Fair  Creat: trending down  Has back pain     Past Medical History:   Diagnosis Date    DJD (degenerative joint disease)     HYPERCHOLESTERAEMIA     Hypertension     Sarcoidosis     Spinal cord injuries      FALL       Past Surgical History:   Procedure Laterality Date    BACK SURGERY      COLONOSCOPY  12/24/2018    COLONOSCOPY POLYPECTOMY SNARE/COLD BIOPSY performed by [facility]    OTHER SURGICAL HISTORY  05/01/2018    [facility]""    UPPER GASTROINTESTINAL ENDOSCOPY  12/24/2018    COLONOSCOPY SUBMUCOSAL/BOTOX INJECTION performed by Edawrd Hahn MD at [address] [place] [address] reviewed.  No pertinent family history. reports that he quit smoking about 6 years ago. He has never used smokeless tobacco. He reports that he does not drink alcohol or use drugs.     Allergies:  Baclofen and Robaxin [methocarbamol]    Current Medications:    oxyCODONE (ROXICODONE) immediate release tablet 10 mg, Q6H PRN  0.9 % sodium chloride infusion, Continuous  aspirin EC tablet 81 mg, Daily  albuterol sulfate  (90 Base) MCG/ACT inhaler 2 puff, Q6H PRN  dextran 70-hypromellose (TEARS NATURALE) 0.1-0.3 % opthalmic solution 1 drop, 4x Daily PRN  lidocaine 4 % external patch 1 patch, Daily PRN  sennosides-docusate sodium (SENOKOT-S) 8.6-50 MG tablet 2 tablet, BID PRN  vitamin D (CHOLECALCIFEROL) tablet 1,000 Units, Daily  sodium chloride flush 0.9 % injection 5-40 mL, 2 times per day  sodium chloride flush 0.9 % injection 5-40 mL, PRN  0.9 % sodium chloride infusion, PRN  promethazine (PHENERGAN) tablet 12.5 mg, Q6H PRN    Or  ondansetron (ZOFRAN) injection 4 mg, Q6H PRN  polyethylene glycol (GLYCOLAX) packet 17 g, Daily PRN  acetaminophen (TYLENOL) tablet 650 mg, Q6H PRN    Or  acetaminophen (TYLENOL) suppository 650 mg, Q6H PRN  heparin (porcine) injection 5,000 Units, 3 times per day        Physical exam:     Vitals:  /79   Pulse 85   Temp 97.1 °F (36.2 °C) (Oral)   Resp 18   Ht 5' 11\" (1.803 m)   Wt 258 lb 9.6 oz (117.3 kg)   SpO2 93%   BMI 36.07 kg/m²   Constitutional:  OAA X3 NAD  Skin: no rash, turgor wnl  Heent:  eomi, mmm  Neck: no bruits or jvd noted  Cardiovascular:  S1, S2 without m/r/g  Respiratory: CTA B without w/r/r  Abdomen:  +bs, soft, nt, nd  Ext: 1+ lower extremity edema      Data:   Labs:  CBC:   Recent Labs     04/15/21  1122 04/16/21  0623 04/17/21  0400   WBC 7.0 6.6 4.9   HGB 13.1* 13.7 13.1*    180 187     BMP:    Recent Labs     04/15/21  1122 04/16/21  0623 04/17/21  0400   * 136 136   K 4.7 4.8 4.5   CL 96* 101 99   CO2 25 25 27   BUN 60* 45* 27*   CREATININE 2.7* 1.6* 1.1 GLUCOSE 192* 153* 188*     Ca/Mg/Phos:   Recent Labs     04/15/21  1122 04/16/21  0623 04/17/21  0400   CALCIUM 9.4 9.6 9.8   PHOS  --  3.0 2.6     Hepatic:   Recent Labs     04/15/21  1122   AST 24   ALT 25   BILITOT 0.5   ALKPHOS 78     Troponin:   Recent Labs     04/15/21  1122   TROPONINI <0.01     BNP: No results for input(s): BNP in the last 72 hours. Lipids: No results for input(s): CHOL, TRIG, HDL, LDLCALC, LABVLDL in the last 72 hours. ABGs: No results for input(s): PHART, PO2ART, QSZ5IVF in the last 72 hours. INR: No results for input(s): INR in the last 72 hours. UA:  Recent Labs     04/15/21  1132   COLORU Yellow   CLARITYU Clear   GLUCOSEU Negative   BILIRUBINUR Negative   KETUA Negative   SPECGRAV >=1.030   BLOODU Negative   PHUR 5.5   PROTEINU Negative   UROBILINOGEN 0.2   NITRU Negative   LEUKOCYTESUR Negative   LABMICR Not Indicated   URINETYPE Other      Urine Microscopic: No results for input(s): LABCAST, BACTERIA, COMU, HYALCAST, WBCUA, RBCUA, EPIU in the last 72 hours. Urine Culture: No results for input(s): LABURIN in the last 72 hours. Urine Chemistry:   Recent Labs     04/15/21  1305   LABCREA 231.3   NAUR 55             IMAGING:  XR CHEST PORTABLE   Final Result   Impression: Stable cardiomegaly. Assessment/Plan   1. AMS, resolved      2. HARRY, improving  -creatinine improved to 1.6  -baseline creatinine of 0.9-1.2  - prerenal d/t dehydration       3. Acute hypoxic respiratory failure 2/2 aspiration pneumonitis   -reportedly had low spO2 at nursing facility just after receiving pain medications  - patient denies any sob  -patient had normal spO2 on arrival to the ED   -CXR showed stable cardiomegaly       4.  HTN    5. ch back pain  -plan for surg in [address]  -hold nephrotoxic agents  -dc IVF  -Hold ACE inhibitor today  -strict I/Os  -Monitor electrolytes                  Thank you for allowing us to participate in care of [employer]

## 2021-04-17 NOTE — DISCHARGE INSTR - COC
Continuity of Care Form    Patient Name: Bipin Adams   :    MRN:  [id number]    Admit date:  4/15/2021  Discharge date:  2021    Code Status Order: Full Code   Advance Directives:   Advance Care Flowsheet Documentation       Date/Time Healthcare Directive Type of Healthcare Directive Copy in [address] Agent's Name Healthcare Agent's Phone Number    04/15/21 1419  No, patient does not have an advance directive for healthcare treatment -- -- -- -- --            Admitting Physician:  Scooby Iyer DO  PCP: Herminio Guerrero [id number]    Discharging Nurse: Anastasia Reynaga Unit/Room#: 6943/9388-86  Discharging Unit Phone Number: [phone]    Emergency Contact:   Extended Emergency Contact Information  Primary Emergency Contact: [address] Phone: [phone]  Mobile Phone: [phone]  Relation: Brother/Sister  Secondary Emergency Contact: Yusra Kaba  Mobile Phone: [phone]  Relation: None    Past Surgical History:  Past Surgical History:   Procedure Laterality Date    BACK SURGERY      COLONOSCOPY  2018    COLONOSCOPY POLYPECTOMY SNARE/COLD BIOPSY [facility]o [facility]    [facility]    UPPER GASTROINTESTINAL ENDOSCOPY  2018    COLONOSCOPY SUBMUCOSAL/BOTOX INJECTION performed by Rodrigo Baker MD at [facility]       Immunization History: There is no immunization history on file for this patient.     Active Problems:  Patient Active Problem List   Diagnosis Code    Right-sided chest wall pain R07.89    Pneumonia J18.9    Sarcoidosis D86.9    Syncope, near R55    Rectal bleeding K62.5    Diverticulosis K57.90    HTN (hypertension) I10    Bright red rectal bleeding K62.5    Hypertensive emergency I16.1    Headache R51.9    Acute kidney injury ([address] N17.9    Hypoxia R09.02    Right-sided low back pain without sciatica M54.5    Hypotension due to hypovolemia I95.89, E86.1       Isolation/Infection:   Isolation            Droplet Plus          Patient Infection Status       Infection Onset Added Last Indicated Last Indicated By Review Planned Expiration Resolved Resolved By    None active    Resolved    COVID-19 Rule Out 04/15/21 04/15/21 04/16/21 COVID-19 (Ordered)   04/17/21 Rule-Out Test Resulted            Nurse Assessment:  Last Vital Signs: /84   Pulse 88   Temp 97.6 °F (36.4 °C) (Oral)   Resp 18   Ht 5' 11\" (1.803 m)   Wt 258 lb 9.6 oz (117.3 kg)   SpO2 95%   BMI 36.07 kg/m²     Last documented pain score (0-10 scale): Pain Level: 8  Last Weight:   Wt Readings from Last 1 Encounters:   04/16/21 258 lb 9.6 oz (117.3 kg)     Mental Status:  oriented    IV Access:  - None    Nursing Mobility/ADLs:  Walking   Dependent  Transfer  Dependent  Bathing  Dependent  Dressing  Dependent  Toileting  Dependent  Feeding  Independent  Med Admin  Assisted  Med Delivery   whole    Wound Care Documentation and Therapy:  Incision 05/01/18 Back Posterior (Active)   Number of days: 1081        Elimination:  Continence:   · Bowel: Yes  · Bladder: Yes  Urinary Catheter: None   Colostomy/Ileostomy/Ileal Conduit: No       Date of Last BM: 4/18/21    Intake/Output Summary (Last 24 hours) at 4/17/2021 1228  Last data filed at 4/17/2021 0647  Gross per 24 hour   Intake 2351 ml   Output 2325 ml   Net 26 ml     I/O last 3 completed shifts: In: 2831 [P.O.:1200; I.V.:1631]  Out: 4000 [Urine:4000]    Safety Concerns:     None and At Risk for Falls    Impairments/Disabilities: Hx back surgery, needs back brace. Nutrition Therapy:  Current Nutrition Therapy:   - Oral Diet:  General    Routes of Feeding: Oral  Liquids:  Thin Liquids  Daily Fluid Restriction: no  Last Modified Barium Swallow with Video (Video Swallowing Test): not done    Treatments at the Time of Hospital Discharge: Respiratory Treatments: none   Oxygen Therapy:  is not on home oxygen therapy. Ventilator:    - No ventilator support    Rehab Therapies: Physical Therapy and Occupational Therapy  Weight Bearing Status/Restrictions: No weight bearing restirctions  Other Medical Equipment (for information only, NOT a DME order):  walker, hospital bed and braces back brace  Other Treatments: n/a    Patient's personal belongings (please select all that are sent with patient):  None    RN SIGNATURE:  Electronically signed by Bozena Prieto RN on 4/17/21 at 2:38 PM EDT    CASE MANAGEMENT/SOCIAL WORK SECTION    Inpatient Status Date: 4/15/21    Readmission Risk Assessment Score:  Readmission Risk              Risk of Unplanned Readmission:        16           Discharging to Facility/ [address] Details              [address] [facility] [address]       Phone: [phone]       Fax: [phone]        ·     Dialysis Facility (if applicable)   · Name:  · Address:  · Dialysis Schedule:  · Phone:  · Fax:    / signature: Electronically signed by Joe Espino RN on 4/17/21 at 1:27 PM EDT    PHYSICIAN SECTION    Prognosis: Good    Condition at Discharge: Stable    Rehab Potential (if transferring to Rehab): Good    Recommended Labs or Other Treatments After Discharge:   PT/OT  Follow-up with neurosurgery as already scheduled  Stopped valium  Trying flexeril due to reported Robaxin allergy  Normotensive, ACEI and HCTZ held for HARRY  BM monitoring and restarting BP medications as indicated. Physician Certification: I certify the above information and transfer of Debora García  is necessary for the continuing treatment of the diagnosis listed and that he requires [facility] for greater 30 days.      Update Admission H&P: No change in H&P    PHYSICIAN SIGNATURE:  Electronically signed by Alaina Balbuena DO on 4/17/21 at 12:31 PM EDT

## 2021-04-17 NOTE — PROGRESS NOTES
Pt refused repositioning throughout shift offered by this RN. RN explained importance of repositioning and benefits of it. Pt is aware.

## 2021-04-17 NOTE — PLAN OF CARE
Problem: Falls - Risk of:  Goal: Will remain free from falls  Description: Will remain free from falls  4/17/2021 1930 by Grecia Musa RN  Outcome: Met This Shift   Pt will remain free from accidental injury this shift. Pt has fall risk measures (fall risk bracelet, fall risk sign, fall risk cloth, non-slip socks, dome light on) in place. Pt bed is in low position, bed alarm on, bed wheels locked, call light within reach, bedside table within reach, chair wheels locked, chair alarm on. Problem: Skin Integrity:  Goal: Absence of new skin breakdown  Description: Absence of new skin breakdown  4/17/2021 1930 by Grecia Musa RN  Outcome: Met This Shift   No signs of new skin breakdown. Problem: Pain:  Goal: Pain level will decrease  Description: Pain level will decrease  4/17/2021 0818 by Trinna Cluster  Outcome: Ongoing   Pt getting medication for chronic back pain. Pt continues to request more medication for pain. Pt reporting pain >8 through most of shift with pain medication. MD aware and pt on normal pain regiment. Problem: Fluid Volume:  Goal: Ability to achieve a balanced intake and output will improve  Description: Ability to achieve a balanced intake and output will improve  4/17/2021 0818 by Trinna Cluster  Outcome: Ongoing   MD has discontinued fluids. Pt output good through shift.     Problem: Physical Regulation:  Goal: Complications related to the disease process, condition or treatment will be avoided or minimized  Description: Complications related to the disease process, condition or treatment will be avoided or minimized  4/17/2021 1930 by Grecia Musa RN  Outcome: Met This Shift

## 2021-04-17 NOTE — PROGRESS NOTES
Hospitalist Progress Note      PCP: [facility]    Date of Admission: 4/15/2021    Chief Complaint: Brought to ED for AMS at facility     History Of Present Illness:       Danis Welch is a 60 y.o. male with past medical history as below, including HTN, HLD, sarcoidosis, rectal bleeding, back pain with recent admit, given back brace and dc'd to facility, who presents with AMS per his facility. Per notes and discussion with ED his nurse at facility gave him his medications including oxycodone and came back to check on him and found him to be lethargic and hypoxic on room air. Blood sugar was reported to be normal. Patient AMS resolved by the time he reached the ED. Subjective:     Patient frustrated by feeling weak but explains that this is not new and he was recently having further imaging done at the [place]. Leg weakness has unfortunately been the same. Medications:  Reviewed    Infusion Medications    sodium chloride       Scheduled Medications    aspirin  81 mg Oral Daily    Vitamin D  1,000 Units Oral Daily    sodium chloride flush  5-40 mL Intravenous 2 times per day    heparin (porcine)  5,000 Units Subcutaneous 3 times per day     PRN Meds: oxyCODONE, albuterol sulfate HFA, dextran 70-hypromellose, lidocaine, sennosides-docusate sodium, sodium chloride flush, sodium chloride, promethazine **OR** ondansetron, polyethylene glycol, acetaminophen **OR** acetaminophen      Intake/Output Summary (Last 24 hours) at 4/17/2021 1208  Last data filed at 4/17/2021 0647  Gross per 24 hour   Intake 2351 ml   Output 2325 ml   Net 26 ml       Exam:    /79   Pulse 85   Temp 97.1 °F (36.2 °C) (Oral)   Resp 18   Ht 5' 11\" (1.803 m)   Wt 258 lb 9.6 oz (117.3 kg)   SpO2 93%   BMI 36.07 kg/m²     General appearance: No apparent distress, appears stated age and cooperative. HEENT: Pupils equal, round, and reactive to light. Conjunctivae/corneas clear. Neck: Supple, with full range of motion. No jugular venous distention. Trachea midline. Respiratory:  Normal respiratory effort. Clear to auscultation, bilaterally without Rales/Wheezes/Rhonchi. Cardiovascular: Regular rate and rhythm with normal S1/S2 without murmurs, rubs or gallops. Abdomen: Soft, non-tender, non-distended with normal bowel sounds. Musculoskelatal: No clubbing, cyanosis or edema bilaterally. Skin: Skin color, texture, turgor normal.  No rashes or lesions. Neurologic:  Cranial nerves: II-XII intact, LE b/l raises against gravity and also somewhat against resistance as well although does not lift legs very high off the bed. Psychiatric: Alert and oriented, thought content appropriate, normal insight    Labs:   Recent Labs     04/15/21  1122 04/16/21  0623 04/17/21  0400   WBC 7.0 6.6 4.9   HGB 13.1* 13.7 13.1*   HCT 40.3* 42.4 40.3*    180 187     Recent Labs     04/15/21  1122 04/16/21  0623 04/17/21  0400   * 136 136   K 4.7 4.8 4.5   CL 96* 101 99   CO2 25 25 27   BUN 60* 45* 27*   CREATININE 2.7* 1.6* 1.1   CALCIUM 9.4 9.6 9.8   PHOS  --  3.0 2.6     Recent Labs     04/15/21  1122   AST 24   ALT 25   BILITOT 0.5   ALKPHOS 78     No results for input(s): INR in the last 72 hours. Recent Labs     04/15/21  1122   TROPONINI <0.01       Studies:  XR CHEST PORTABLE   Final Result   Impression: Stable cardiomegaly.           Assessment/Plan:    Active Hospital Problems    Diagnosis Date Noted    Hypoxia [R09.02]     Right-sided low back pain without sciatica [M54.5]     Hypotension due to hypovolemia [I95.89, E86.1]     Acute kidney injury ([facility] [address] [N17.9] 04/15/2021     HARRY, appears prerenal  UA - appears dehydrated  Urine lytes  PVR, monitor for retention  Strict Is/Os  Normal saline  Significant increase from baseline, will consult nephrology      Brief episode of AMS noted at the facility, may be due to respiratory depression with hypoxia  Likely due to polypharmacy in the setting of HARRY  Decreased narcotics due to AMS and HARRY  Patient requests to have increased pain medications  Hold benzodiazepines  Continue on added Flexeril in addition to opiates    Fever, one episode reported, possible error  Patient reports feeling well aside from chronic back pain. No further fevers reported. Will monitor for any recurrence and expand infectious workup if needed.     Hypoxia at facility, in Ed 94% on room air  CXR no acute abnormalities, patient denies symptoms currently  Monitor for further O2 requirement  May have sleep apnea  Breathing well on room air    Back Pain LE weakness bilaterally, 2/5 noted at recent admission  Discharged to SNF for therapy with back brace, outpatient follow-up     Diet: DIET CARDIAC; Dietary Nutrition Supplements: Frozen Oral Supplement  Code Status: Full Code    PT/OT Eval Status: Ongoing    Dispo - Inpatient, dc to SNF when SNF ready.     Lottie Álvarez DO

## 2021-04-17 NOTE — CARE COORDINATION
Case Management Assessment            Discharge Note                    Date / Time of Note: 4/17/2021 1:24 PM                  Discharge Note Completed by: Tanvi Judge    Patient Name: Iker Gee   YOB: 1955  Diagnosis: HARRY (acute kidney injury) [facility]) [N17.9]  HARRY (acute kidney injury) [facility]) [N17.9]   Date / Time: 4/15/2021 10:40 AM    Current PCP: Ever Aldridge patient: No    Hospitalization in the last 30 days: No    Advance Directives:  Code Status: Full Code  [place] DNR form completed and on chart: Not Indicated    Financial:  Payor: Nino Turner / Plan: Farshad Pepper / Product Type: *No Product type* /      Pharmacy:    [place] [address] [address] [address] Jaren Maddox [phone] - F [phone]  [address] [facility] [address]  Phone: [phone] Fax: [phone]  [phone] - F [phone]  [facility]  Phone: [phone] Fax: [phone]      Assistance purchasing medications?: Potential Assistance Purchasing Medications: No  Assistance provided by Case Management: None at this time    Does patient want to participate in local refill/ meds to beds program?: No    Meds To Beds General Rules:  1. Can ONLY be done Monday- Friday between 8:30am-5pm  2. Prescription(s) must be in pharmacy by 3pm to be filled same day  3. Copy of patient's insurance/ prescription drug card and patient face sheet must be sent along with the prescription(s)  4. Cost of Rx cannot be added to hospital bill. If financial assistance is needed, please contact unit  or ;  or  CANNOT provide pharmacy voucher for patients co-pays  5.  Patients can then  the prescription on their way out of the hospital at discharge, or pharmacy can deliver to the bedside if staff is available. (payment due at time of pick-up or delivery - cash, check, or card accepted)     Able to afford home medications/ co-pay costs: Yes    ADLS:  Current PT AM-PAC Score: 7 /24  Current OT AM-PAC Score: 14 /24      DISCHARGE Disposition: [place] (SNF): [place] Phone: [phone] Fax: [phone]    LOC at discharge: Skilled  [address] Completed: Yes    Notification completed in HENS/PAS?:  Not Applicable    IMM Completed:   Not Indicated    Transportation:  Transportation PLAN for discharge: EMS transportation   Mode of Transport: Ambulance stretcher - BLS  Reason for medical transport: Bed confined: Meets the following criteria 1) unable to get out of bed without assistance or ambulate, 2) unable to safely sit up in a wheelchair, 3) unable to maintain erect seating position in a chair for time needed for transport  Name of Transport Company: [facility]  Phone: [phone]  Time of Transport: 5:15pm    Transport form completed: Yes    Home Care:  [address] ordered at discharge: Not [address]: Not Applicable  Orders faxed: No    Durable Medical Equipment:  DME Provider:    Equipment obtained during hospitalization: none    Home Oxygen and Respiratory Equipment:  Oxygen needed at discharge?: Not [address]: Not Applicable  Portable tank available for discharge?: Not Indicated    Dialysis:  Dialysis patient: No    Dialysis Center:  Not Applicable    Hospice Services:  Location: Not Applicable  Agency: Not Applicable    Consents signed: Not Indicated    Referrals made at [facility] for outpatient continued care:  Not Applicable    Additional CM Notes:   Cm met with pt. Agreeable with dc to return to [facility] today. Pt is aware of transport time of 5:15pm.    CM called Candelario Troncoso at [facility]. Left voicemail pt has dc order to return.      The Plan for Transition of Care is related to the following treatment goals of HARRY (acute kidney injury) ([address] [N17.9]  HARRY (acute kidney injury) ([address] [N17.9]    The Patient and/or patient representative Whit Robb and his family were provided with a choice of provider and agrees with the discharge plan Not Indicated    Freedom of choice list was provided with basic dialogue that supports the patient's individualized plan of care/goals and shares the quality data associated with the providers.  Not Indicated    Care Transitions patient: No    Tia Number, RN  [facility]  Case Management Department  Ph: [phone]

## 2021-04-18 LAB
ALBUMIN SERPL-MCNC: 3.5 G/DL (ref 3.4–5)
ANION GAP SERPL CALCULATED.3IONS-SCNC: 10 MMOL/L (ref 3–16)
BASOPHILS ABSOLUTE: 0.1 K/UL (ref 0–0.2)
BASOPHILS RELATIVE PERCENT: 1.2 %
BUN BLDV-MCNC: 21 MG/DL (ref 7–20)
CALCIUM SERPL-MCNC: 9.7 MG/DL (ref 8.3–10.6)
CHLORIDE BLD-SCNC: 100 MMOL/L (ref 99–110)
CO2: 26 MMOL/L (ref 21–32)
CREAT SERPL-MCNC: 1.2 MG/DL (ref 0.8–1.3)
EOSINOPHILS ABSOLUTE: 0.1 K/UL (ref 0–0.6)
EOSINOPHILS RELATIVE PERCENT: 1.8 %
GFR AFRICAN AMERICAN: >60
GFR NON-AFRICAN AMERICAN: >60
GLUCOSE BLD-MCNC: 160 MG/DL (ref 70–99)
HCT VFR BLD CALC: 41.5 % (ref 40.5–52.5)
HEMOGLOBIN: 13.4 G/DL (ref 13.5–17.5)
LYMPHOCYTES ABSOLUTE: 0.7 K/UL (ref 1–5.1)
LYMPHOCYTES RELATIVE PERCENT: 13.2 %
MCH RBC QN AUTO: 27.2 PG (ref 26–34)
MCHC RBC AUTO-ENTMCNC: 32.3 G/DL (ref 31–36)
MCV RBC AUTO: 84.2 FL (ref 80–100)
MONOCYTES ABSOLUTE: 0.8 K/UL (ref 0–1.3)
MONOCYTES RELATIVE PERCENT: 15.8 %
NEUTROPHILS ABSOLUTE: 3.5 K/UL (ref 1.7–7.7)
NEUTROPHILS RELATIVE PERCENT: 68 %
PDW BLD-RTO: 14.8 % (ref 12.4–15.4)
PHOSPHORUS: 3.1 MG/DL (ref 2.5–4.9)
PLATELET # BLD: 182 K/UL (ref 135–450)
PMV BLD AUTO: 7.1 FL (ref 5–10.5)
POTASSIUM SERPL-SCNC: 4.4 MMOL/L (ref 3.5–5.1)
RBC # BLD: 4.92 M/UL (ref 4.2–5.9)
SODIUM BLD-SCNC: 136 MMOL/L (ref 136–145)
WBC # BLD: 5.1 K/UL (ref 4–11)

## 2021-04-18 PROCEDURE — 6360000002 HC RX W HCPCS: Performed by: INTERNAL MEDICINE

## 2021-04-18 PROCEDURE — 85025 COMPLETE CBC W/AUTO DIFF WBC: CPT

## 2021-04-18 PROCEDURE — 2580000003 HC RX 258: Performed by: INTERNAL MEDICINE

## 2021-04-18 PROCEDURE — 80069 RENAL FUNCTION PANEL: CPT

## 2021-04-18 PROCEDURE — 6370000000 HC RX 637 (ALT 250 FOR IP): Performed by: INTERNAL MEDICINE

## 2021-04-18 PROCEDURE — 1200000000 HC SEMI PRIVATE

## 2021-04-18 PROCEDURE — 99232 SBSQ HOSP IP/OBS MODERATE 35: CPT | Performed by: HOSPITALIST

## 2021-04-18 RX ORDER — OXYCODONE HYDROCHLORIDE 5 MG/1
10 TABLET ORAL EVERY 4 HOURS PRN
Status: DISCONTINUED | OUTPATIENT
Start: 2021-04-18 | End: 2021-04-19 | Stop reason: HOSPADM

## 2021-04-18 RX ADMIN — OXYCODONE HYDROCHLORIDE 10 MG: 5 TABLET ORAL at 01:15

## 2021-04-18 RX ADMIN — OXYCODONE 10 MG: 5 TABLET ORAL at 19:52

## 2021-04-18 RX ADMIN — Medication 1000 UNITS: at 09:42

## 2021-04-18 RX ADMIN — HEPARIN SODIUM 5000 UNITS: 5000 INJECTION INTRAVENOUS; SUBCUTANEOUS at 22:09

## 2021-04-18 RX ADMIN — ASPIRIN 81 MG: 81 TABLET, COATED ORAL at 09:42

## 2021-04-18 RX ADMIN — OXYCODONE HYDROCHLORIDE 10 MG: 5 TABLET ORAL at 09:57

## 2021-04-18 RX ADMIN — HEPARIN SODIUM 5000 UNITS: 5000 INJECTION INTRAVENOUS; SUBCUTANEOUS at 09:44

## 2021-04-18 RX ADMIN — OXYCODONE 10 MG: 5 TABLET ORAL at 15:10

## 2021-04-18 RX ADMIN — DOCUSATE SODIUM 50 MG AND SENNOSIDES 8.6 MG 2 TABLET: 8.6; 5 TABLET, FILM COATED ORAL at 18:28

## 2021-04-18 RX ADMIN — Medication 10 ML: at 20:42

## 2021-04-18 RX ADMIN — ACETAMINOPHEN 650 MG: 325 TABLET ORAL at 01:16

## 2021-04-18 RX ADMIN — ACETAMINOPHEN 650 MG: 325 TABLET ORAL at 12:20

## 2021-04-18 RX ADMIN — OXYCODONE 10 MG: 5 TABLET ORAL at 23:55

## 2021-04-18 RX ADMIN — ACETAMINOPHEN 650 MG: 325 TABLET ORAL at 19:52

## 2021-04-18 RX ADMIN — HEPARIN SODIUM 5000 UNITS: 5000 INJECTION INTRAVENOUS; SUBCUTANEOUS at 15:10

## 2021-04-18 RX ADMIN — Medication 10 ML: at 09:57

## 2021-04-18 RX ADMIN — POLYETHYLENE GLYCOL 3350 17 G: 17 POWDER, FOR SOLUTION ORAL at 18:29

## 2021-04-18 ASSESSMENT — PAIN DESCRIPTION - PROGRESSION: CLINICAL_PROGRESSION: GRADUALLY WORSENING

## 2021-04-18 ASSESSMENT — PAIN DESCRIPTION - DESCRIPTORS
DESCRIPTORS: SHARP
DESCRIPTORS: ACHING;CONSTANT
DESCRIPTORS: ACHING

## 2021-04-18 ASSESSMENT — PAIN DESCRIPTION - ORIENTATION
ORIENTATION: LOWER
ORIENTATION: LOWER

## 2021-04-18 ASSESSMENT — PAIN DESCRIPTION - LOCATION: LOCATION: BACK

## 2021-04-18 ASSESSMENT — PAIN SCALES - GENERAL
PAINLEVEL_OUTOF10: 3
PAINLEVEL_OUTOF10: 9

## 2021-04-18 ASSESSMENT — PAIN DESCRIPTION - FREQUENCY
FREQUENCY: CONTINUOUS
FREQUENCY: CONTINUOUS

## 2021-04-18 ASSESSMENT — PAIN - FUNCTIONAL ASSESSMENT: PAIN_FUNCTIONAL_ASSESSMENT: ACTIVITIES ARE NOT PREVENTED

## 2021-04-18 ASSESSMENT — PAIN DESCRIPTION - PAIN TYPE
TYPE: CHRONIC PAIN
TYPE: CHRONIC PAIN

## 2021-04-18 NOTE — PROGRESS NOTES
Nephrology  Note                                                                                                                                                                                                                                                                                                                                                               Office : [phone]     Fax :[phone]              Patient's Name: Maria Fernanda Nuñez  9:11 AM  4/18/2021    Reason for Consult: HARRY  Requesting Physician:  [facility]      Chief Complaint:  AMS    History of Present Ilness:    Maria Fernanda Nuñez is a 60 y.o. male with PMH HTN, HLD, sarcoidosis, back pain, rectal bleeding and recent admission at [facility] (3/31-4/5) for intractable back pain who presented to the ED from nursing facility with AMS. Per notes nurse at facility gave him his medications including oxycodone and came back to check on him and found him to be lethargic and hypoxic on room air. Blood sugar was reported to be normal. Patient AMS resolved by the time he reached the ED. In the ED he was found to have an HARRY with creatinine of 2.7 (baseline 0.9-1.2). INTERVAL HISTORY    Feels same  Shortness of breath: No   UOP: Good   Creat: trending down  Has back pain     Past Medical History:   Diagnosis Date    DJD (degenerative joint disease)     HYPERCHOLESTERAEMIA     Hypertension     Sarcoidosis     Spinal cord injuries      FALL       Past Surgical History:   Procedure Laterality Date    BACK SURGERY      COLONOSCOPY  12/24/2018    COLONOSCOPY POLYPECTOMY SNARE/COLD BIOPSY performed by [facility]itor Ervin [facility] at [facility]    OTHER SURGICAL HISTORY  [facility]/01/2018    [facility]    UPPER GASTROINTESTINAL ENDOSCOPY  12/24/2018    COLONOSCOPY SUBMUCOSAL/BOTOX INJECTION performed by Vitor Ervin MD at [address] [place] [address] reviewed.  No pertinent family history. reports that he quit smoking about 6 years ago. He has never used smokeless tobacco. He reports that he does not drink alcohol or use drugs.     Allergies:  Baclofen and Robaxin [methocarbamol]    Current Medications:    cyclobenzaprine (FLEXERIL) tablet 5 mg, TID PRN  oxyCODONE (ROXICODONE) immediate release tablet 10 mg, Q6H PRN  aspirin EC tablet 81 mg, Daily  albuterol sulfate  (90 Base) MCG/ACT inhaler 2 puff, Q6H PRN  dextran 70-hypromellose (TEARS NATURALE) 0.1-0.3 % opthalmic solution 1 drop, 4x Daily PRN  lidocaine 4 % external patch 1 patch, Daily PRN  sennosides-docusate sodium (SENOKOT-S) 8.6-50 MG tablet 2 tablet, BID PRN  vitamin D (CHOLECALCIFEROL) tablet 1,000 Units, Daily  sodium chloride flush 0.9 % injection 5-40 mL, 2 times per day  sodium chloride flush 0.9 % injection 5-40 mL, PRN  0.9 % sodium chloride infusion, PRN  promethazine (PHENERGAN) tablet 12.5 mg, Q6H PRN    Or  ondansetron (ZOFRAN) injection 4 mg, Q6H PRN  polyethylene glycol (GLYCOLAX) packet 17 g, Daily PRN  acetaminophen (TYLENOL) tablet 650 mg, Q6H PRN    Or  acetaminophen (TYLENOL) suppository 650 mg, Q6H PRN  heparin (porcine) injection 5,000 Units, 3 times per day        Physical exam:     Vitals:  /74   Pulse 77   Temp 98.9 °F (37.2 °C) (Oral)   Resp 19   Ht 5' 11\" (1.803 m)   Wt 258 lb 9.6 oz (117.3 kg)   SpO2 93%   BMI 36.07 kg/m²   Constitutional:  OAA X3 NAD  Skin: no rash, turgor wnl  Heent:  eomi, mmm  Neck: no bruits or jvd noted  Cardiovascular:  S1, S2 without m/r/g  Respiratory: CTA B without w/r/r  Abdomen:  +bs, soft, nt, nd  Ext: 1+ lower extremity edema      Data:   Labs:  CBC:   Recent Labs     04/16/21  0623 04/17/21  0400 04/18/21  0505   WBC 6.6 4.9 5.1   HGB 13.7 13.1* 13.4*    187 182     BMP:    Recent Labs     04/16/21  0623 04/17/21  0400 04/18/21  0505    136 136   K 4.8 4.5 4.4    99 100   CO2 25 27 26   BUN 45* 27* 21*   CREATININE 1.6* 1.1 1. 2   GLUCOSE 153* 188* 160*     Ca/Mg/Phos:   Recent Labs     04/16/21  0623 04/17/21  0400 04/18/21  0505   CALCIUM 9.6 9.8 9.7   PHOS 3.0 2.6 3.1     Hepatic:   Recent Labs     04/15/21  1122   AST 24   ALT 25   BILITOT 0.5   ALKPHOS 78     Troponin:   Recent Labs     04/15/21  1122   TROPONINI <0.01     BNP: No results for input(s): BNP in the last 72 hours. Lipids: No results for input(s): CHOL, TRIG, HDL, LDLCALC, LABVLDL in the last 72 hours. ABGs: No results for input(s): PHART, PO2ART, KLN7QGO in the last 72 hours. INR: No results for input(s): INR in the last 72 hours. UA:  Recent Labs     04/15/21  1132   COLORU Yellow   CLARITYU Clear   GLUCOSEU Negative   BILIRUBINUR Negative   KETUA Negative   SPECGRAV >=1.030   BLOODU Negative   PHUR 5.5   PROTEINU Negative   UROBILINOGEN 0.2   NITRU Negative   LEUKOCYTESUR Negative   LABMICR Not Indicated   URINETYPE Other      Urine Microscopic: No results for input(s): LABCAST, BACTERIA, COMU, HYALCAST, WBCUA, RBCUA, EPIU in the last 72 hours. Urine Culture: No results for input(s): LABURIN in the last 72 hours. Urine Chemistry:   Recent Labs     04/15/21  1305   LABCREA 231.3   NAUR 55             IMAGING:  XR CHEST PORTABLE   Final Result   Impression: Stable cardiomegaly. Assessment/Plan   1. AMS, resolved      2. HARRY, improving  -creatinine improved to 1.2  -baseline creatinine of 0.9-1.2  - prerenal d/t dehydration       3. Acute hypoxic respiratory failure 2/2 aspiration pneumonitis   -reportedly had low spO2 at nursing facility just after receiving pain medications  - patient denies any sob  -patient had normal spO2 on arrival to the ED   -CXR showed stable cardiomegaly       4. HTN  - BP controlled    5.  ch back pain  -plan for surg in [address]  -hold nephrotoxic agents  -minimize IVF  - resume ACE inhibitor on discharge  -strict I/Os  -Monitor electrolytes        Spoke to [facility], DO           Thank you for allowing us to participate in [facility] [address]  4/18/2021    [facility] [address]  Office : [phone]  Fax :[phone]

## 2021-04-18 NOTE — PROGRESS NOTES
Hospitalist Progress Note      PCP: [facility]    Date of Admission: 4/15/2021    Chief Complaint: Brought to ED for AMS at facility     History Of Present Illness:       Antione Julien is a 60 y.o. male with past medical history as below, including HTN, HLD, sarcoidosis, rectal bleeding, back pain with recent admit, given back brace and dc'd to facility, who presents with AMS per his facility. Per notes and discussion with ED his nurse at facility gave him his medications including oxycodone and came back to check on him and found him to be lethargic and hypoxic on room air. Blood sugar was reported to be normal. Patient AMS resolved by the time he reached the ED. Subjective:     Feels better today. No chest pain, sob abdominal pain, n/v, diarrhea or constipation. +Chronic back pain unchanged. Medications:  Reviewed    Infusion Medications    sodium chloride       Scheduled Medications    aspirin  81 mg Oral Daily    Vitamin D  1,000 Units Oral Daily    sodium chloride flush  5-40 mL Intravenous 2 times per day    heparin (porcine)  5,000 Units Subcutaneous 3 times per day     PRN Meds: cyclobenzaprine, oxyCODONE, albuterol sulfate HFA, dextran 70-hypromellose, lidocaine, sennosides-docusate sodium, sodium chloride flush, sodium chloride, promethazine **OR** ondansetron, polyethylene glycol, acetaminophen **OR** acetaminophen      Intake/Output Summary (Last 24 hours) at 4/18/2021 0909  Last data filed at 4/18/2021 0526  Gross per 24 hour   Intake 795 ml   Output 2700 ml   Net -1905 ml       Exam:    /74   Pulse 77   Temp 98.9 °F (37.2 °C) (Oral)   Resp 19   Ht 5' 11\" (1.803 m)   Wt 258 lb 9.6 oz (117.3 kg)   SpO2 93%   BMI 36.07 kg/m²     General appearance: No apparent distress, appears stated age and cooperative. HEENT: Pupils equal, round, and reactive to light. Conjunctivae/corneas clear. Neck: Supple, with full range of motion. No jugular venous distention.  Trachea midline. Respiratory:  Normal respiratory effort. Clear to auscultation, bilaterally without Rales/Wheezes/Rhonchi. Cardiovascular: Regular rate and rhythm with normal S1/S2 without murmurs, rubs or gallops. Abdomen: Soft, non-tender, non-distended with normal bowel sounds. Musculoskelatal: No clubbing, cyanosis or edema bilaterally. Skin: Skin color, texture, turgor normal.  No rashes or lesions. Neurologic:  Cranial nerves: II-XII intact, LE b/l raises against gravity and also somewhat against resistance as well although does not lift legs very high off the bed. Psychiatric: Alert and oriented, thought content appropriate, normal insight    Labs:   Recent Labs     04/16/21  0623 04/17/21  0400 04/18/21  0505   WBC 6.6 4.9 5.1   HGB 13.7 13.1* 13.4*   HCT 42.4 40.3* 41.5    187 182     Recent Labs     04/16/21  0623 04/17/21  0400 04/18/21  0505    136 136   K 4.8 4.5 4.4    99 100   CO2 25 27 26   BUN 45* 27* 21*   CREATININE 1.6* 1.1 1.2   CALCIUM 9.6 9.8 9.7   PHOS 3.0 2.6 3.1     Recent Labs     04/15/21  1122   AST 24   ALT 25   BILITOT 0.5   ALKPHOS 78     No results for input(s): INR in the last 72 hours. Recent Labs     04/15/21  1122   TROPONINI <0.01       Studies:  XR CHEST PORTABLE   Final Result   Impression: Stable cardiomegaly. Assessment/Plan:    Active Hospital Problems    Diagnosis Date Noted    Hypoxia [R09.02]     Right-sided low back pain without sciatica [M54.5]     Hypotension due to hypovolemia [I95.89, E86.1]     Acute kidney injury ([facility] [address] [N17.9] 04/15/2021     HARRY, appears prerenal  UA - appears dehydrated  Strict Is/Os  Normal saline  Nephrology consulted, appreciate recs     Brief episode of AMS noted at the facility, may be due to respiratory depression with hypoxia. Likely due to polypharmacy in the setting of HARRY.   Decreased narcotics due to AMS and HARRY  Patient requests to have increased pain medications  Hold benzodiazepines  Tried flexeril but he reports intolerances to all muscle relaxants, says he also cannot take neurontin    Fever, one episode reported, possible error  Patient reports feeling well aside from chronic back pain. No further fevers reported. Will monitor for any recurrence and expand infectious workup if needed.     Hypoxia at facility, in Ed 94% on room air  CXR no acute abnormalities, patient denies symptoms currently  Monitor for further O2 requirement  May have sleep apnea  Breathing well on room air    Back Pain LE weakness bilaterally, 2/5 noted at recent admission  Discharged to SNF for therapy with back brace, outpatient follow-up     Diet: DIET CARDIAC; Dietary Nutrition Supplements: Frozen Oral Supplement  Code Status: Full Code    PT/OT Eval Status: Ongoing    Dispo - Inpatient, dc to SNF when SNF ready.     Lottie Álvarez DO

## 2021-04-18 NOTE — PLAN OF CARE
Problem: Falls - Risk of:  Goal: Will remain free from falls  Description: Will remain free from falls  Outcome: Ongoing  Note: [place] Risk per WALTON/ABCDS: Pt bed is in low position, side rails up, call light and belongings are in reach. Fall risk light is on outside pts room. Pt encouraged to call for assistance as needed. Will continue with hourly rounds for PO intake, pain needs, toileting and repositioning as needed. Problem: Skin Integrity:  Goal: Will show no infection signs and symptoms  Description: Will show no infection signs and symptoms  Outcome: Ongoing  Note: Pt skin is clean dry and intact. Will continue to monitor. Problem: Pain:  Goal: Pain level will decrease  Description: Pain level will decrease  Outcome: Ongoing  Note: Pt has complained of chronic back pain. Medicated with pain medication and repositioned pt. Pt refused heat pad. Will continue to monitor.       Problem: Fluid Volume:  Goal: Signs and symptoms of dehydration will decrease  Description: Signs and symptoms of dehydration will decrease  Outcome: Ongoing     Problem: Fluid Volume:  Goal: Diagnostic test results will improve  Description: Diagnostic test results will improve  Outcome: Ongoing

## 2021-04-18 NOTE — PLAN OF CARE
Problem: Falls - Risk of:  Goal: Will remain free from falls  Description: Will remain free from falls  4/18/2021 0217 by Fanta Arce RN  Outcome: Met This Shift   No falls this shift, bed in lowest position, bed alarm on, non skid socks on, call light within reach, hourly checks, safety maintained, will continue to monitor. Problem: Skin Integrity:  Goal: Will show no infection signs and symptoms  Description: Will show no infection signs and symptoms  Outcome: Met This Shift  Pt currently shows no s/s of new infections    Problem: Pain:  Goal: Control of chronic pain  Description: Control of chronic pain  4/18/2021 0217 by Fanta Arce RN  Outcome: Met This Shift  Patient continues to voice pain throughout shift, PRN pain medication administered as ordered with effectiveness noted.

## 2021-04-18 NOTE — PROGRESS NOTES
Patient to be transferred to [address] [place] [address], belongings gathered, report called, patient will notify family, vitals stable, patient given oxycodone and tylenol for 9/10 back pain

## 2021-04-19 VITALS
HEIGHT: 71 IN | BODY MASS INDEX: 36.2 KG/M2 | WEIGHT: 258.6 LBS | SYSTOLIC BLOOD PRESSURE: 138 MMHG | TEMPERATURE: 98.2 F | HEART RATE: 100 BPM | DIASTOLIC BLOOD PRESSURE: 77 MMHG | OXYGEN SATURATION: 92 % | RESPIRATION RATE: 20 BRPM

## 2021-04-19 LAB
ALBUMIN SERPL-MCNC: 3.5 G/DL (ref 3.4–5)
ANION GAP SERPL CALCULATED.3IONS-SCNC: 11 MMOL/L (ref 3–16)
BASOPHILS ABSOLUTE: 0.2 K/UL (ref 0–0.2)
BASOPHILS RELATIVE PERCENT: 1.9 %
BUN BLDV-MCNC: 19 MG/DL (ref 7–20)
CALCIUM SERPL-MCNC: 9.9 MG/DL (ref 8.3–10.6)
CHLORIDE BLD-SCNC: 95 MMOL/L (ref 99–110)
CO2: 25 MMOL/L (ref 21–32)
CREAT SERPL-MCNC: 1.2 MG/DL (ref 0.8–1.3)
EOSINOPHILS ABSOLUTE: 0.1 K/UL (ref 0–0.6)
EOSINOPHILS RELATIVE PERCENT: 0.7 %
GFR AFRICAN AMERICAN: >60
GFR NON-AFRICAN AMERICAN: >60
GLUCOSE BLD-MCNC: 163 MG/DL (ref 70–99)
HCT VFR BLD CALC: 42.8 % (ref 40.5–52.5)
HEMOGLOBIN: 13.9 G/DL (ref 13.5–17.5)
LYMPHOCYTES ABSOLUTE: 0.7 K/UL (ref 1–5.1)
LYMPHOCYTES RELATIVE PERCENT: 7.3 %
MCH RBC QN AUTO: 27.2 PG (ref 26–34)
MCHC RBC AUTO-ENTMCNC: 32.5 G/DL (ref 31–36)
MCV RBC AUTO: 83.7 FL (ref 80–100)
MONOCYTES ABSOLUTE: 1.1 K/UL (ref 0–1.3)
MONOCYTES RELATIVE PERCENT: 11.2 %
NEUTROPHILS ABSOLUTE: 7.6 K/UL (ref 1.7–7.7)
NEUTROPHILS RELATIVE PERCENT: 78.9 %
PDW BLD-RTO: 14.8 % (ref 12.4–15.4)
PHOSPHORUS: 3.2 MG/DL (ref 2.5–4.9)
PLATELET # BLD: 194 K/UL (ref 135–450)
PMV BLD AUTO: 7.5 FL (ref 5–10.5)
POTASSIUM SERPL-SCNC: 4.1 MMOL/L (ref 3.5–5.1)
RBC # BLD: 5.11 M/UL (ref 4.2–5.9)
SODIUM BLD-SCNC: 131 MMOL/L (ref 136–145)
WBC # BLD: 9.6 K/UL (ref 4–11)

## 2021-04-19 PROCEDURE — 99232 SBSQ HOSP IP/OBS MODERATE 35: CPT | Performed by: INTERNAL MEDICINE

## 2021-04-19 PROCEDURE — 6360000002 HC RX W HCPCS: Performed by: INTERNAL MEDICINE

## 2021-04-19 PROCEDURE — 80069 RENAL FUNCTION PANEL: CPT

## 2021-04-19 PROCEDURE — 85025 COMPLETE CBC W/AUTO DIFF WBC: CPT

## 2021-04-19 PROCEDURE — 6370000000 HC RX 637 (ALT 250 FOR IP): Performed by: INTERNAL MEDICINE

## 2021-04-19 PROCEDURE — 2580000003 HC RX 258: Performed by: INTERNAL MEDICINE

## 2021-04-19 RX ORDER — OXYCODONE HYDROCHLORIDE 10 MG/1
10 TABLET ORAL EVERY 4 HOURS PRN
Qty: 9 TABLET | Refills: 0 | Status: SHIPPED | OUTPATIENT
Start: 2021-04-19 | End: 2021-04-22

## 2021-04-19 RX ORDER — OXYCODONE HYDROCHLORIDE 10 MG/1
5 TABLET ORAL EVERY 6 HOURS PRN
Qty: 12 TABLET | Refills: 0 | Status: SHIPPED | OUTPATIENT
Start: 2021-04-19 | End: 2021-04-22

## 2021-04-19 RX ORDER — KETOROLAC TROMETHAMINE 30 MG/ML
15 INJECTION, SOLUTION INTRAMUSCULAR; INTRAVENOUS ONCE
Status: COMPLETED | OUTPATIENT
Start: 2021-04-19 | End: 2021-04-19

## 2021-04-19 RX ADMIN — KETOROLAC TROMETHAMINE 15 MG: 30 INJECTION, SOLUTION INTRAMUSCULAR; INTRAVENOUS at 04:54

## 2021-04-19 RX ADMIN — ACETAMINOPHEN 650 MG: 325 TABLET ORAL at 04:04

## 2021-04-19 RX ADMIN — Medication 10 ML: at 07:36

## 2021-04-19 RX ADMIN — OXYCODONE 10 MG: 5 TABLET ORAL at 12:38

## 2021-04-19 RX ADMIN — ASPIRIN 81 MG: 81 TABLET, COATED ORAL at 07:36

## 2021-04-19 RX ADMIN — OXYCODONE 10 MG: 5 TABLET ORAL at 08:26

## 2021-04-19 RX ADMIN — ACETAMINOPHEN 650 MG: 325 TABLET ORAL at 14:43

## 2021-04-19 RX ADMIN — Medication 1000 UNITS: at 07:36

## 2021-04-19 RX ADMIN — OXYCODONE 10 MG: 5 TABLET ORAL at 04:04

## 2021-04-19 RX ADMIN — OXYCODONE 10 MG: 5 TABLET ORAL at 16:53

## 2021-04-19 RX ADMIN — HEPARIN SODIUM 5000 UNITS: 5000 INJECTION INTRAVENOUS; SUBCUTANEOUS at 14:43

## 2021-04-19 RX ADMIN — HEPARIN SODIUM 5000 UNITS: 5000 INJECTION INTRAVENOUS; SUBCUTANEOUS at 05:40

## 2021-04-19 ASSESSMENT — PAIN DESCRIPTION - PROGRESSION
CLINICAL_PROGRESSION: GRADUALLY WORSENING
CLINICAL_PROGRESSION: GRADUALLY WORSENING

## 2021-04-19 ASSESSMENT — PAIN DESCRIPTION - DESCRIPTORS: DESCRIPTORS: ACHING

## 2021-04-19 ASSESSMENT — PAIN DESCRIPTION - DIRECTION: RADIATING_TOWARDS: LEG

## 2021-04-19 ASSESSMENT — PAIN DESCRIPTION - ONSET
ONSET: ON-GOING
ONSET: ON-GOING

## 2021-04-19 ASSESSMENT — PAIN - FUNCTIONAL ASSESSMENT
PAIN_FUNCTIONAL_ASSESSMENT: ACTIVITIES ARE NOT PREVENTED
PAIN_FUNCTIONAL_ASSESSMENT: PREVENTS OR INTERFERES SOME ACTIVE ACTIVITIES AND ADLS

## 2021-04-19 ASSESSMENT — PAIN SCALES - GENERAL
PAINLEVEL_OUTOF10: 3
PAINLEVEL_OUTOF10: 2
PAINLEVEL_OUTOF10: 10

## 2021-04-19 ASSESSMENT — PAIN DESCRIPTION - ORIENTATION: ORIENTATION: LOWER

## 2021-04-19 ASSESSMENT — PAIN DESCRIPTION - FREQUENCY
FREQUENCY: CONTINUOUS

## 2021-04-19 ASSESSMENT — PAIN DESCRIPTION - PAIN TYPE: TYPE: ACUTE PAIN

## 2021-04-19 NOTE — FLOWSHEET NOTE
04/19/21 1354   Encounter Summary   Services provided to: Patient   Referral/Consult From: Rounding   Complexity of Encounter High   Length of Encounter 15 minutes   Routine   Type Initial   Assessment Approachable; Anxious; Fearful   Intervention Active listening;Explored feelings, thoughts, concerns;Nurtured hope   Outcome Refused/declined   I did inform RN, Afshin Grullon of the outcomes of my brief visit.   Staff Mireya Garza MA

## 2021-04-19 NOTE — CARE COORDINATION
Case Management Assessment            Discharge Note                    Date / Time of Note: 4/19/2021 10:24 AM                  Discharge Note Completed by: Neri Garcia    Patient Name: Lolita Beasley   YOB: 1955  Diagnosis: HARRY (acute kidney injury) [facility]) [N17.9]  HARRY (acute kidney injury) [facility]) [N17.9]   Date / Time: 4/15/2021 10:40 AM    Current PCP: [address] [place] [address] Oly patient: No    Hospitalization in the last 30 days: Yes    Advance Directives:  Code Status: Full Code  [place] DNR form completed and on chart: Not Indicated    Financial:  Payor: Jong Dash / Plan: Katty Cristobal / Product Type: *No Product type* /      Pharmacy:    [place], [address] [place] [address] Isela Denise. Kate Quintin [phone] - F [phone]  [address] eHrnández [address] [place] [address]  Phone: [phone] Fax: [phone] [facility] Rosa M Juncos Seemayanickrosa mjosh  [phone] -  [phone]  [address] [place] [address]  Phone: [phone] Fax: [phone]      Assistance purchasing medications?: Potential Assistance Purchasing Medications: No  Assistance provided by Case Management: None at this time    Does patient want to participate in local refill/ meds to beds program?: No    Meds To Beds General Rules:  1. Can ONLY be done Monday- Friday between 8:30am-5pm  2. Prescription(s) must be in pharmacy by 3pm to be filled same day  3. Copy of patient's insurance/ prescription drug card and patient face sheet must be sent along with the prescription(s)  4. Cost of Rx cannot be added to hospital bill. If financial assistance is needed, please contact unit  or ;  or  CANNOT provide pharmacy voucher for patients co-pays  5.  Patients can then  the prescription on their way out of the hospital at discharge, or pharmacy can deliver to the bedside if staff is available. (payment due at time of pick-up or delivery - cash, check, or card accepted)     Able to afford home medications/ co-pay costs: Yes    ADLS:  Current PT AM-PAC Score: 7 /24  Current OT AM-PAC Score: 14 /24      DISCHARGE Disposition: [facility] (SNF): [facility] Phone: [phone] Fax: [phone]    LOC at discharge: Skilled  [address] Completed: Yes    Notification completed in HENS/PAS?:  Not Applicable, never DC back to community from SNF    IMM Completed:   Not Indicated    Transportation:  Transportation PLAN for discharge: EMS transportation   Mode of Transport: Ambulance stretcher - BLS  Reason for medical transport: Not Applicable  Name of [address] [place] [address],[facility] 530: [address] Jose Potter  Phone: [phone]  Time of Transport: 5p    Transport form completed: Yes          Additional CM Notes: Pt will DC back to [facility] at 5 pm via [facility] [phone]. Spoke with Christiano Mayberry [phone] pending VA auth for pt po return Clinical sent Christiano Mayberry states should be back today. The Plan for Transition of Care is related to the following treatment goals of HARRY (acute kidney injury) ([facility] [address] [N17.9]  HARRY (acute kidney injury) ([facility] [N17.9]    The Patient and/or patient representative Stephenie Gamez and his family were provided with a choice of provider and agrees with the discharge plan Yes    Freedom of choice list was provided with basic dialogue that supports the patient's individualized plan of care/goals and shares the quality data associated with the providers.  Yes    Care Transitions patient: No    Jose Roberto Harrington RN  [facility]  Case Management Department  Ph: [phone]  Fax: [phone]

## 2021-04-19 NOTE — PROGRESS NOTES
Attempted to call report to [facility], no answer, will call back soon    Update: attempted to call report, no answer. Will call back soon 4:09 PM     Update: attempted to call report, no answer. Total of 3 voicemails have been left.  Case management aware 4:46 PM     Update: total of 4 voicemails have been left 4:50 PM

## 2021-04-19 NOTE — PROGRESS NOTES
Pt remained A&Ox4 and VSS. Pt voiding freely with condom cath in place. Pt received PRN pain medication throughout shift for chronic pain reported in back. Fall precautions remained in place with call light within reach. Discharge order acknowledged and IV removed.  Pt escorted safely off unit by EMS transport and discharged around [facility]    Signed Wil Fajardo RN

## 2021-04-19 NOTE — PLAN OF CARE
Problem: Falls - Risk of:  Goal: Will remain free from falls  Description: Will remain free from falls  Outcome: Met This Shift  Note: Pt has remained free of falls, as Pt is a fall risk. Bed/chair alarm remains armed and bed is in lowest position. Pt's room door remains open, and call light is within reach. Pt has on non-skid socks and calls out appropriately       Problem: Skin Integrity:  Goal: Will show no infection signs and symptoms  Description: Will show no infection signs and symptoms  Outcome: Ongoing     Problem: Pain:  Goal: Pain level will decrease  Description: Pain level will decrease  Outcome: Ongoing  Note: Pt has reported chronic pain in back during shift, and has received PRN pain medication per orders.  Pt reported pain radiating down to legs

## 2021-04-19 NOTE — PROGRESS NOTES
Nephrology  Note                                                                                                                                                                                                                                                                                                                                                               Office : [phone]     Fax :[phone]              Patient's Name: Violette Keane  9:53 AM  4/19/2021    Reason for Consult: HARRY  Requesting Physician:  [facility]      Chief Complaint:  AMS    History of Present Ilness:    Violette Keane is a 60 y.o. male with PMH HTN, HLD, sarcoidosis, back pain, rectal bleeding and recent admission at [facility] (3/31-4/5) for intractable back pain who presented to the ED from nursing facility with AMS. Per notes nurse at facility gave him his medications including oxycodone and came back to check on him and found him to be lethargic and hypoxic on room air. Blood sugar was reported to be normal. Patient AMS resolved by the time he reached the ED. In the ED he was found to have an HARRY with creatinine of 2.7 (baseline 0.9-1.2). INTERVAL HISTORY     Feels same  Shortness of breath: No   UOP: Good   Creat: trending down  No acute complaints at this time. He is happy his kidney function is getting better.  Is asking when he will be able to leave hospital.     Past Medical History:   Diagnosis Date    DJD (degenerative joint disease)     HYPERCHOLESTERAEMIA     Hypertension     Sarcoidosis     Spinal cord injuries      FALL       Past Surgical History:   Procedure Laterality Date    BACK SURGERY      COLONOSCOPY  12/24/2018    COLONOSCOPY POLYPECTOMY SNARE/COLD BIOPSY performed by Aj Trent [facility] at [facility]    OTHER SURGICAL HISTORY  05/01/2018    [facility]    UPPER GASTROINTESTINAL ENDOSCOPY  12/24/2018 COLONOSCOPY SUBMUCOSAL/BOTOX INJECTION performed by Rodrigo Baker MD at [facility] reviewed. No pertinent family history. reports that he quit smoking about 6 years ago. He has never used smokeless tobacco. He reports that he does not drink alcohol or use drugs.     Allergies:  Baclofen and Robaxin [methocarbamol]    Current Medications:    oxyCODONE (ROXICODONE) immediate release tablet 10 mg, Q4H PRN  cyclobenzaprine (FLEXERIL) tablet 5 mg, TID PRN  aspirin EC tablet 81 mg, Daily  albuterol sulfate  (90 Base) MCG/ACT inhaler 2 puff, Q6H PRN  dextran 70-hypromellose (TEARS NATURALE) 0.1-0.3 % opthalmic solution 1 drop, 4x Daily PRN  lidocaine 4 % external patch 1 patch, Daily PRN  sennosides-docusate sodium (SENOKOT-S) 8.6-50 MG tablet 2 tablet, BID PRN  vitamin D (CHOLECALCIFEROL) tablet 1,000 Units, Daily  sodium chloride flush 0.9 % injection 5-40 mL, 2 times per day  sodium chloride flush 0.9 % injection 5-40 mL, PRN  0.9 % sodium chloride infusion, PRN  promethazine (PHENERGAN) tablet 12.5 mg, Q6H PRN    Or  ondansetron (ZOFRAN) injection 4 mg, Q6H PRN  polyethylene glycol (GLYCOLAX) packet 17 g, Daily PRN  acetaminophen (TYLENOL) tablet 650 mg, Q6H PRN    Or  acetaminophen (TYLENOL) suppository 650 mg, Q6H PRN  heparin (porcine) injection 5,000 Units, 3 times per day        Physical exam:     Vitals:  /66   Pulse 75   Temp 97.8 °F (36.6 °C) (Oral)   Resp 18   Ht 5' 11\" (1.803 m)   Wt 258 lb 9.6 oz (117.3 kg)   SpO2 92%   BMI 36.07 kg/m²   Constitutional:  OAA X3 NAD  Skin: no rash, turgor wnl  Heent:  eomi, mmm  Neck: no bruits or jvd noted  Cardiovascular:  S1, S2 without m/r/g  Respiratory: CTA B without w/r/r  Abdomen:  +bs, soft, nt, nd  Ext: 1+ lower extremity edema      Data:   Labs:  CBC:   Recent Labs     04/17/21  0400 04/18/21  0505 04/19/21  0452   WBC 4.9 5.1 9.6   HGB 13.1* 13.4* 13.9    182 194     BMP:    Recent Labs     04/17/21  0400 04/18/21  0505 04/19/21  0452    136 131*   K 4.5 4.4 4.1   CL 99 100 95*   CO2 27 26 25   BUN 27* 21* 19   CREATININE 1.1 1.2 1.2   GLUCOSE 188* 160* 163*     Ca/Mg/Phos:   Recent Labs     04/17/21  0400 04/18/21  0505 04/19/21  0452   CALCIUM 9.8 9.7 9.9   PHOS 2.6 3.1 3.2     Hepatic:   No results for input(s): AST, ALT, ALB, BILITOT, ALKPHOS in the last 72 hours. Troponin:   No results for input(s): TROPONINI in the last 72 hours. BNP: No results for input(s): BNP in the last 72 hours. Lipids: No results for input(s): CHOL, TRIG, HDL, LDLCALC, LABVLDL in the last 72 hours. ABGs: No results for input(s): PHART, PO2ART, [id number] in the last 72 hours. INR: No results for input(s): INR in the last 72 hours. UA:  No results for input(s): Moreno Rather, GLUCOSEU, BILIRUBINUR, KETUA, SPECGRAV, BLOODU, PHUR, PROTEINU, UROBILINOGEN, NITRU, LEUKOCYTESUR, Roscoe Severin in the last 72 hours. Urine Microscopic: No results for input(s): LABCAST, BACTERIA, COMU, HYALCAST, WBCUA, RBCUA, EPIU in the last 72 hours. Urine Culture: No results for input(s): LABURIN in the last 72 hours. Urine Chemistry:   No results for input(s): Quentin Rule, PROTEINUR, NAUR in the last 72 hours. IMAGING:  XR CHEST PORTABLE   Final Result   Impression: Stable cardiomegaly. Assessment/Plan   1. AMS, resolved      2. HARRY, resolved  -creatinine stable at 1.2  -baseline creatinine of 0.9-1.2  - prerenal d/t dehydration   Plan  - discharge today. resume ACE inhibitor on discharge. F/u with Dr. Louie Sawant in 1-2 weeks  -hold nephrotoxic agents  -minimize IVF  -strict I/Os  -Monitor electrolytes      3. Acute hypoxic respiratory failure 2/2 aspiration pneumonitis   - reportedly had low spO2 at nursing facility just after receiving pain medications  - patient denies any sob  - patient had normal spO2 on arrival to the ED   - CXR showed stable cardiomegaly       4.  HTN  - BP controlled  Plan  - resume ACE inhibitor on discharge     5.  Chronic back pain  -plan for surg in [facility]              Spoke to Abhinav Birmingham MD           Thank you for allowing us to participate in care of Jordy Sarabia MD, PGY-1    Katty Enter  4/19/2021    [facility] [address]  Office : [phone]  Fax :[phone]       Agree with plan above   Cr better   Ok to d/c today     Yovany Murphy MD

## 2021-04-23 ENCOUNTER — APPOINTMENT (OUTPATIENT)
Dept: VASCULAR LAB | Age: 66
DRG: 166 | End: 2021-04-23
Payer: OTHER GOVERNMENT

## 2021-04-23 ENCOUNTER — HOSPITAL ENCOUNTER (INPATIENT)
Age: 66
LOS: 3 days | Discharge: HOME HEALTH | DRG: 166 | End: 2021-04-26
Attending: EMERGENCY MEDICINE | Admitting: INTERNAL MEDICINE
Payer: OTHER GOVERNMENT

## 2021-04-23 ENCOUNTER — APPOINTMENT (OUTPATIENT)
Dept: CT IMAGING | Age: 66
DRG: 166 | End: 2021-04-23
Payer: OTHER GOVERNMENT

## 2021-04-23 ENCOUNTER — APPOINTMENT (OUTPATIENT)
Dept: GENERAL RADIOLOGY | Age: 66
DRG: 166 | End: 2021-04-23
Payer: OTHER GOVERNMENT

## 2021-04-23 ENCOUNTER — APPOINTMENT (OUTPATIENT)
Dept: INTERVENTIONAL RADIOLOGY/VASCULAR | Age: 66
DRG: 166 | End: 2021-04-23
Payer: OTHER GOVERNMENT

## 2021-04-23 DIAGNOSIS — R09.02 HYPOXIA: ICD-10-CM

## 2021-04-23 DIAGNOSIS — G89.29 CHRONIC RIGHT-SIDED LOW BACK PAIN WITHOUT SCIATICA: ICD-10-CM

## 2021-04-23 DIAGNOSIS — M54.50 CHRONIC RIGHT-SIDED LOW BACK PAIN WITHOUT SCIATICA: ICD-10-CM

## 2021-04-23 DIAGNOSIS — I26.09 OTHER ACUTE PULMONARY EMBOLISM WITH ACUTE COR PULMONALE (HCC): Primary | ICD-10-CM

## 2021-04-23 PROBLEM — I26.99 BILATERAL PULMONARY EMBOLISM (HCC): Status: ACTIVE | Noted: 2021-04-23

## 2021-04-23 LAB
ANION GAP SERPL CALCULATED.3IONS-SCNC: 12 MMOL/L (ref 3–16)
ANION GAP SERPL CALCULATED.3IONS-SCNC: 16 MMOL/L (ref 3–16)
ANTI-XA UNFRAC HEPARIN: 0.12 IU/ML (ref 0.3–0.7)
ANTI-XA UNFRAC HEPARIN: 0.47 IU/ML (ref 0.3–0.7)
APTT: 208.5 SEC (ref 24.2–36.2)
APTT: 29 SEC (ref 24.2–36.2)
APTT: 53.8 SEC (ref 24.2–36.2)
BASE EXCESS VENOUS: 3.5 MMOL/L (ref -2–3)
BASOPHILS ABSOLUTE: 0 K/UL (ref 0–0.2)
BASOPHILS RELATIVE PERCENT: 0 %
BUN BLDV-MCNC: 40 MG/DL (ref 7–20)
BUN BLDV-MCNC: 45 MG/DL (ref 7–20)
CALCIUM SERPL-MCNC: 9.2 MG/DL (ref 8.3–10.6)
CALCIUM SERPL-MCNC: 9.6 MG/DL (ref 8.3–10.6)
CARBOXYHEMOGLOBIN: 1 % (ref 0–1.5)
CHLORIDE BLD-SCNC: 92 MMOL/L (ref 99–110)
CHLORIDE BLD-SCNC: 93 MMOL/L (ref 99–110)
CO2: 20 MMOL/L (ref 21–32)
CO2: 24 MMOL/L (ref 21–32)
CREAT SERPL-MCNC: 1.8 MG/DL (ref 0.8–1.3)
CREAT SERPL-MCNC: 2 MG/DL (ref 0.8–1.3)
EKG ATRIAL RATE: 118 BPM
EKG DIAGNOSIS: NORMAL
EKG P AXIS: 29 DEGREES
EKG P-R INTERVAL: 162 MS
EKG Q-T INTERVAL: 340 MS
EKG QRS DURATION: 136 MS
EKG QTC CALCULATION (BAZETT): 476 MS
EKG R AXIS: -69 DEGREES
EKG T AXIS: 35 DEGREES
EKG VENTRICULAR RATE: 118 BPM
EOSINOPHILS ABSOLUTE: 0 K/UL (ref 0–0.6)
EOSINOPHILS RELATIVE PERCENT: 0 %
FIBRINOGEN: 283 MG/DL (ref 200–397)
FIBRINOGEN: 287 MG/DL (ref 200–397)
GFR AFRICAN AMERICAN: 41
GFR AFRICAN AMERICAN: 46
GFR NON-AFRICAN AMERICAN: 34
GFR NON-AFRICAN AMERICAN: 38
GLUCOSE BLD-MCNC: 198 MG/DL (ref 70–99)
GLUCOSE BLD-MCNC: 199 MG/DL (ref 70–99)
HCO3 VENOUS: 26.7 MMOL/L (ref 24–28)
HCT VFR BLD CALC: 39.8 % (ref 40.5–52.5)
HCT VFR BLD CALC: 41.3 % (ref 40.5–52.5)
HCT VFR BLD CALC: 45.2 % (ref 40.5–52.5)
HEMOGLOBIN, VEN, REDUCED: 27.3 %
HEMOGLOBIN: 12.9 G/DL (ref 13.5–17.5)
HEMOGLOBIN: 13.5 G/DL (ref 13.5–17.5)
HEMOGLOBIN: 14.7 G/DL (ref 13.5–17.5)
INR BLD: 1.94 (ref 0.86–1.14)
LYMPHOCYTES ABSOLUTE: 0.7 K/UL (ref 1–5.1)
LYMPHOCYTES RELATIVE PERCENT: 5 %
MCH RBC QN AUTO: 27.1 PG (ref 26–34)
MCH RBC QN AUTO: 27.2 PG (ref 26–34)
MCH RBC QN AUTO: 27.2 PG (ref 26–34)
MCHC RBC AUTO-ENTMCNC: 32.4 G/DL (ref 31–36)
MCHC RBC AUTO-ENTMCNC: 32.4 G/DL (ref 31–36)
MCHC RBC AUTO-ENTMCNC: 32.5 G/DL (ref 31–36)
MCV RBC AUTO: 83.2 FL (ref 80–100)
MCV RBC AUTO: 83.7 FL (ref 80–100)
MCV RBC AUTO: 83.9 FL (ref 80–100)
METHEMOGLOBIN VENOUS: 0.3 % (ref 0–1.5)
MONOCYTES ABSOLUTE: 1.2 K/UL (ref 0–1.3)
MONOCYTES RELATIVE PERCENT: 9 %
NEUTROPHILS ABSOLUTE: 11.6 K/UL (ref 1.7–7.7)
NEUTROPHILS RELATIVE PERCENT: 86 %
O2 SAT, VEN: 72 %
PCO2, VEN: 35.4 MMHG (ref 41–51)
PDW BLD-RTO: 14.7 % (ref 12.4–15.4)
PDW BLD-RTO: 14.8 % (ref 12.4–15.4)
PDW BLD-RTO: 15 % (ref 12.4–15.4)
PH VENOUS: 7.49 (ref 7.35–7.45)
PLATELET # BLD: 219 K/UL (ref 135–450)
PLATELET # BLD: 240 K/UL (ref 135–450)
PLATELET # BLD: 271 K/UL (ref 135–450)
PMV BLD AUTO: 7.3 FL (ref 5–10.5)
PMV BLD AUTO: 7.4 FL (ref 5–10.5)
PMV BLD AUTO: 7.7 FL (ref 5–10.5)
PO2, VEN: 37.1 MMHG (ref 25–40)
POTASSIUM REFLEX MAGNESIUM: 5 MMOL/L (ref 3.5–5.1)
POTASSIUM SERPL-SCNC: 4.1 MMOL/L (ref 3.5–5.1)
PRO-BNP: 199 PG/ML (ref 0–124)
PROTHROMBIN TIME: 22.7 SEC (ref 10–13.2)
RBC # BLD: 4.75 M/UL (ref 4.2–5.9)
RBC # BLD: 4.97 M/UL (ref 4.2–5.9)
RBC # BLD: 5.4 M/UL (ref 4.2–5.9)
SODIUM BLD-SCNC: 128 MMOL/L (ref 136–145)
SODIUM BLD-SCNC: 129 MMOL/L (ref 136–145)
SODIUM URINE: <20 MMOL/L
TCO2 CALC VENOUS: 28 MMOL/L
TROPONIN: 0.24 NG/ML
TROPONIN: 0.29 NG/ML
TROPONIN: 0.4 NG/ML
WBC # BLD: 13.5 K/UL (ref 4–11)
WBC # BLD: 13.5 K/UL (ref 4–11)
WBC # BLD: 14.4 K/UL (ref 4–11)

## 2021-04-23 PROCEDURE — 93005 ELECTROCARDIOGRAM TRACING: CPT | Performed by: EMERGENCY MEDICINE

## 2021-04-23 PROCEDURE — 02FQ3Z0 FRAGMENTATION OF RIGHT PULMONARY ARTERY, PERCUTANEOUS APPROACH, ULTRASONIC: ICD-10-PCS | Performed by: RADIOLOGY

## 2021-04-23 PROCEDURE — 2700000000 HC OXYGEN THERAPY PER DAY

## 2021-04-23 PROCEDURE — 36014 PLACE CATHETER IN ARTERY: CPT | Performed by: RADIOLOGY

## 2021-04-23 PROCEDURE — 02FR3Z0 FRAGMENTATION OF LEFT PULMONARY ARTERY, PERCUTANEOUS APPROACH, ULTRASONIC: ICD-10-PCS | Performed by: RADIOLOGY

## 2021-04-23 PROCEDURE — 71045 X-RAY EXAM CHEST 1 VIEW: CPT

## 2021-04-23 PROCEDURE — 82570 ASSAY OF URINE CREATININE: CPT

## 2021-04-23 PROCEDURE — 2000000000 HC ICU R&B

## 2021-04-23 PROCEDURE — 71260 CT THORAX DX C+: CPT

## 2021-04-23 PROCEDURE — 83880 ASSAY OF NATRIURETIC PEPTIDE: CPT

## 2021-04-23 PROCEDURE — 85027 COMPLETE CBC AUTOMATED: CPT

## 2021-04-23 PROCEDURE — C1725 CATH, TRANSLUMIN NON-LASER: HCPCS

## 2021-04-23 PROCEDURE — 93005 ELECTROCARDIOGRAM TRACING: CPT | Performed by: STUDENT IN AN ORGANIZED HEALTH CARE EDUCATION/TRAINING PROGRAM

## 2021-04-23 PROCEDURE — 2580000003 HC RX 258: Performed by: RADIOLOGY

## 2021-04-23 PROCEDURE — 85730 THROMBOPLASTIN TIME PARTIAL: CPT

## 2021-04-23 PROCEDURE — 99285 EMERGENCY DEPT VISIT HI MDM: CPT

## 2021-04-23 PROCEDURE — 85025 COMPLETE CBC W/AUTO DIFF WBC: CPT

## 2021-04-23 PROCEDURE — 84300 ASSAY OF URINE SODIUM: CPT

## 2021-04-23 PROCEDURE — 37211 THROMBOLYTIC ART THERAPY: CPT | Performed by: RADIOLOGY

## 2021-04-23 PROCEDURE — 94150 VITAL CAPACITY TEST: CPT

## 2021-04-23 PROCEDURE — 2580000003 HC RX 258: Performed by: STUDENT IN AN ORGANIZED HEALTH CARE EDUCATION/TRAINING PROGRAM

## 2021-04-23 PROCEDURE — C1894 INTRO/SHEATH, NON-LASER: HCPCS

## 2021-04-23 PROCEDURE — 2500000003 HC RX 250 WO HCPCS

## 2021-04-23 PROCEDURE — 6360000002 HC RX W HCPCS: Performed by: EMERGENCY MEDICINE

## 2021-04-23 PROCEDURE — 85520 HEPARIN ASSAY: CPT

## 2021-04-23 PROCEDURE — 85384 FIBRINOGEN ACTIVITY: CPT

## 2021-04-23 PROCEDURE — 6370000000 HC RX 637 (ALT 250 FOR IP): Performed by: EMERGENCY MEDICINE

## 2021-04-23 PROCEDURE — C1887 CATHETER, GUIDING: HCPCS

## 2021-04-23 PROCEDURE — 96365 THER/PROPH/DIAG IV INF INIT: CPT

## 2021-04-23 PROCEDURE — 99291 CRITICAL CARE FIRST HOUR: CPT | Performed by: INTERNAL MEDICINE

## 2021-04-23 PROCEDURE — 85610 PROTHROMBIN TIME: CPT

## 2021-04-23 PROCEDURE — 75743 ARTERY X-RAYS LUNGS: CPT | Performed by: RADIOLOGY

## 2021-04-23 PROCEDURE — 6360000002 HC RX W HCPCS

## 2021-04-23 PROCEDURE — 80048 BASIC METABOLIC PNL TOTAL CA: CPT

## 2021-04-23 PROCEDURE — 6360000002 HC RX W HCPCS: Performed by: RADIOLOGY

## 2021-04-23 PROCEDURE — 3E06317 INTRODUCTION OF OTHER THROMBOLYTIC INTO CENTRAL ARTERY, PERCUTANEOUS APPROACH: ICD-10-PCS | Performed by: RADIOLOGY

## 2021-04-23 PROCEDURE — C1751 CATH, INF, PER/CENT/MIDLINE: HCPCS

## 2021-04-23 PROCEDURE — 93970 EXTREMITY STUDY: CPT

## 2021-04-23 PROCEDURE — 75827 VEIN X-RAY CHEST: CPT | Performed by: RADIOLOGY

## 2021-04-23 PROCEDURE — C1769 GUIDE WIRE: HCPCS

## 2021-04-23 PROCEDURE — 82803 BLOOD GASES ANY COMBINATION: CPT

## 2021-04-23 PROCEDURE — 6360000004 HC RX CONTRAST MEDICATION: Performed by: EMERGENCY MEDICINE

## 2021-04-23 PROCEDURE — 36415 COLL VENOUS BLD VENIPUNCTURE: CPT

## 2021-04-23 PROCEDURE — 96368 THER/DIAG CONCURRENT INF: CPT

## 2021-04-23 PROCEDURE — 94761 N-INVAS EAR/PLS OXIMETRY MLT: CPT

## 2021-04-23 PROCEDURE — 99223 1ST HOSP IP/OBS HIGH 75: CPT | Performed by: INTERNAL MEDICINE

## 2021-04-23 PROCEDURE — 6360000002 HC RX W HCPCS: Performed by: STUDENT IN AN ORGANIZED HEALTH CARE EDUCATION/TRAINING PROGRAM

## 2021-04-23 PROCEDURE — 84484 ASSAY OF TROPONIN QUANT: CPT

## 2021-04-23 RX ORDER — ASPIRIN 81 MG/1
324 TABLET, CHEWABLE ORAL ONCE
Status: COMPLETED | OUTPATIENT
Start: 2021-04-23 | End: 2021-04-23

## 2021-04-23 RX ORDER — SODIUM CHLORIDE 9 MG/ML
INJECTION, SOLUTION INTRAVENOUS CONTINUOUS PRN
Status: ACTIVE | OUTPATIENT
Start: 2021-04-23 | End: 2021-04-24

## 2021-04-23 RX ORDER — ONDANSETRON 2 MG/ML
4 INJECTION INTRAMUSCULAR; INTRAVENOUS EVERY 6 HOURS PRN
Status: DISCONTINUED | OUTPATIENT
Start: 2021-04-23 | End: 2021-04-23 | Stop reason: SDUPTHER

## 2021-04-23 RX ORDER — SODIUM CHLORIDE, SODIUM LACTATE, POTASSIUM CHLORIDE, CALCIUM CHLORIDE 600; 310; 30; 20 MG/100ML; MG/100ML; MG/100ML; MG/100ML
INJECTION, SOLUTION INTRAVENOUS CONTINUOUS
Status: DISCONTINUED | OUTPATIENT
Start: 2021-04-23 | End: 2021-04-23

## 2021-04-23 RX ORDER — PROMETHAZINE HYDROCHLORIDE 25 MG/1
12.5 TABLET ORAL EVERY 6 HOURS PRN
Status: DISCONTINUED | OUTPATIENT
Start: 2021-04-23 | End: 2021-04-23 | Stop reason: SDUPTHER

## 2021-04-23 RX ORDER — PROMETHAZINE HYDROCHLORIDE 25 MG/1
12.5 TABLET ORAL EVERY 6 HOURS PRN
Status: DISCONTINUED | OUTPATIENT
Start: 2021-04-23 | End: 2021-04-26 | Stop reason: HOSPADM

## 2021-04-23 RX ORDER — ACETAMINOPHEN 325 MG/1
650 TABLET ORAL EVERY 6 HOURS PRN
Status: DISCONTINUED | OUTPATIENT
Start: 2021-04-23 | End: 2021-04-24

## 2021-04-23 RX ORDER — HEPARIN SODIUM 1000 [USP'U]/ML
80 INJECTION, SOLUTION INTRAVENOUS; SUBCUTANEOUS ONCE
Status: COMPLETED | OUTPATIENT
Start: 2021-04-23 | End: 2021-04-23

## 2021-04-23 RX ORDER — POLYETHYLENE GLYCOL 3350 17 G/17G
17 POWDER, FOR SOLUTION ORAL DAILY PRN
Status: DISCONTINUED | OUTPATIENT
Start: 2021-04-23 | End: 2021-04-26 | Stop reason: HOSPADM

## 2021-04-23 RX ORDER — SODIUM CHLORIDE 0.9 % (FLUSH) 0.9 %
5-40 SYRINGE (ML) INJECTION EVERY 12 HOURS SCHEDULED
Status: DISCONTINUED | OUTPATIENT
Start: 2021-04-23 | End: 2021-04-26 | Stop reason: HOSPADM

## 2021-04-23 RX ORDER — HEPARIN SODIUM 1000 [USP'U]/ML
40 INJECTION, SOLUTION INTRAVENOUS; SUBCUTANEOUS PRN
Status: DISCONTINUED | OUTPATIENT
Start: 2021-04-23 | End: 2021-04-24

## 2021-04-23 RX ORDER — MORPHINE SULFATE 2 MG/ML
2 INJECTION, SOLUTION INTRAMUSCULAR; INTRAVENOUS ONCE
Status: COMPLETED | OUTPATIENT
Start: 2021-04-23 | End: 2021-04-23

## 2021-04-23 RX ORDER — SODIUM CHLORIDE 9 MG/ML
25 INJECTION, SOLUTION INTRAVENOUS PRN
Status: DISCONTINUED | OUTPATIENT
Start: 2021-04-23 | End: 2021-04-23 | Stop reason: SDUPTHER

## 2021-04-23 RX ORDER — HEPARIN SODIUM 10000 [USP'U]/100ML
18 INJECTION, SOLUTION INTRAVENOUS CONTINUOUS
Status: DISCONTINUED | OUTPATIENT
Start: 2021-04-23 | End: 2021-04-24

## 2021-04-23 RX ORDER — ONDANSETRON 4 MG/1
4 TABLET, ORALLY DISINTEGRATING ORAL ONCE
Status: COMPLETED | OUTPATIENT
Start: 2021-04-23 | End: 2021-04-23

## 2021-04-23 RX ORDER — HEPARIN SODIUM 1000 [USP'U]/ML
80 INJECTION, SOLUTION INTRAVENOUS; SUBCUTANEOUS PRN
Status: DISCONTINUED | OUTPATIENT
Start: 2021-04-23 | End: 2021-04-24

## 2021-04-23 RX ORDER — SODIUM CHLORIDE 0.9 % (FLUSH) 0.9 %
5-40 SYRINGE (ML) INJECTION PRN
Status: DISCONTINUED | OUTPATIENT
Start: 2021-04-23 | End: 2021-04-26 | Stop reason: HOSPADM

## 2021-04-23 RX ORDER — ACETAMINOPHEN 650 MG/1
650 SUPPOSITORY RECTAL EVERY 6 HOURS PRN
Status: DISCONTINUED | OUTPATIENT
Start: 2021-04-23 | End: 2021-04-24

## 2021-04-23 RX ORDER — LIDOCAINE 4 G/G
1 PATCH TOPICAL DAILY
Status: DISCONTINUED | OUTPATIENT
Start: 2021-04-23 | End: 2021-04-26 | Stop reason: HOSPADM

## 2021-04-23 RX ORDER — ONDANSETRON 2 MG/ML
4 INJECTION INTRAMUSCULAR; INTRAVENOUS EVERY 6 HOURS PRN
Status: DISCONTINUED | OUTPATIENT
Start: 2021-04-23 | End: 2021-04-26 | Stop reason: HOSPADM

## 2021-04-23 RX ORDER — SODIUM CHLORIDE 9 MG/ML
25 INJECTION, SOLUTION INTRAVENOUS PRN
Status: DISCONTINUED | OUTPATIENT
Start: 2021-04-23 | End: 2021-04-26 | Stop reason: HOSPADM

## 2021-04-23 RX ADMIN — HEPARIN SODIUM 9070 UNITS: 1000 INJECTION INTRAVENOUS; SUBCUTANEOUS at 04:50

## 2021-04-23 RX ADMIN — Medication 5 ML: at 09:05

## 2021-04-23 RX ADMIN — ONDANSETRON 4 MG: 4 TABLET, ORALLY DISINTEGRATING ORAL at 02:25

## 2021-04-23 RX ADMIN — Medication 10 ML: at 20:15

## 2021-04-23 RX ADMIN — ALTEPLASE 1 MG/HR: 2.2 INJECTION, POWDER, LYOPHILIZED, FOR SOLUTION INTRAVENOUS at 06:18

## 2021-04-23 RX ADMIN — HEPARIN SODIUM 18 UNITS/KG/HR: 10000 INJECTION, SOLUTION INTRAVENOUS at 04:54

## 2021-04-23 RX ADMIN — IOPAMIDOL 80 ML: 755 INJECTION, SOLUTION INTRAVENOUS at 03:31

## 2021-04-23 RX ADMIN — ALTEPLASE 1 MG/HR: 2.2 INJECTION, POWDER, LYOPHILIZED, FOR SOLUTION INTRAVENOUS at 06:20

## 2021-04-23 RX ADMIN — SODIUM CHLORIDE, POTASSIUM CHLORIDE, SODIUM LACTATE AND CALCIUM CHLORIDE: 600; 310; 30; 20 INJECTION, SOLUTION INTRAVENOUS at 07:12

## 2021-04-23 RX ADMIN — Medication 5 ML: at 09:04

## 2021-04-23 RX ADMIN — HEPARIN SODIUM 18 UNITS/KG/HR: 10000 INJECTION, SOLUTION INTRAVENOUS at 16:12

## 2021-04-23 RX ADMIN — ASPIRIN 324 MG: 81 TABLET, CHEWABLE ORAL at 03:32

## 2021-04-23 RX ADMIN — ALTEPLASE 100 MG: KIT at 05:00

## 2021-04-23 RX ADMIN — MORPHINE SULFATE 2 MG: 2 INJECTION, SOLUTION INTRAMUSCULAR; INTRAVENOUS at 03:46

## 2021-04-23 ASSESSMENT — PAIN DESCRIPTION - ORIENTATION
ORIENTATION: RIGHT
ORIENTATION: RIGHT

## 2021-04-23 ASSESSMENT — ENCOUNTER SYMPTOMS
BLOOD IN STOOL: 0
DIARRHEA: 0
NAUSEA: 0
CHEST TIGHTNESS: 0
ANAL BLEEDING: 0
COUGH: 0
SHORTNESS OF BREATH: 1
TROUBLE SWALLOWING: 0
GASTROINTESTINAL NEGATIVE: 1
VOMITING: 0
ABDOMINAL PAIN: 0
BACK PAIN: 1
EYES NEGATIVE: 1
CONSTIPATION: 0
WHEEZING: 0

## 2021-04-23 ASSESSMENT — PAIN DESCRIPTION - LOCATION
LOCATION: BACK

## 2021-04-23 ASSESSMENT — PAIN DESCRIPTION - ONSET
ONSET: ON-GOING

## 2021-04-23 ASSESSMENT — PAIN - FUNCTIONAL ASSESSMENT
PAIN_FUNCTIONAL_ASSESSMENT: PREVENTS OR INTERFERES SOME ACTIVE ACTIVITIES AND ADLS

## 2021-04-23 ASSESSMENT — PAIN DESCRIPTION - PROGRESSION
CLINICAL_PROGRESSION: GRADUALLY WORSENING
CLINICAL_PROGRESSION: GRADUALLY WORSENING

## 2021-04-23 ASSESSMENT — PAIN DESCRIPTION - DIRECTION
RADIATING_TOWARDS: LEFT HIP
RADIATING_TOWARDS: LEFT HIP

## 2021-04-23 ASSESSMENT — PAIN SCALES - GENERAL
PAINLEVEL_OUTOF10: 9
PAINLEVEL_OUTOF10: 8
PAINLEVEL_OUTOF10: 8
PAINLEVEL_OUTOF10: 4

## 2021-04-23 ASSESSMENT — PAIN DESCRIPTION - FREQUENCY
FREQUENCY: CONTINUOUS
FREQUENCY: CONTINUOUS

## 2021-04-23 ASSESSMENT — PAIN DESCRIPTION - PAIN TYPE
TYPE: CHRONIC PAIN
TYPE: CHRONIC PAIN

## 2021-04-23 ASSESSMENT — PAIN DESCRIPTION - DESCRIPTORS: DESCRIPTORS: ACHING;THROBBING

## 2021-04-23 NOTE — H&P
Patient:  Jesica Greene   :   1955      Relevant patient history reviewed and discussed. The procedure including risks and benefits was discussed at length with the patient (or designated family member) and all questions were answered. Informed consent to proceed with the procedure was given. Condition : stable    [facility] nurses notes reviewed and agreed. Medications reviewed. Allergies:    Allergies   Allergen Reactions    Baclofen      Messes with my urine    Robaxin [Methocarbamol]      Irritates my stomach

## 2021-04-23 NOTE — CARE COORDINATION
Case Management Assessment           Initial Evaluation                Date / Time of Evaluation: 4/23/2021 9:24 AM                 Assessment Completed by: Mindi Raines     Patient came in from [facility] and can return. He is a two day bed hold and after 2 days they would need approval again from the [place] but he is able to return there. Patient Name: Clem Taylor     YOB: 1955  Diagnosis: Bilateral pulmonary embolism [facility]) [I26.99]     Date / Time: 4/23/2021  1:45 AM    Patient Admission Status: Inpatient    If patient is discharged prior to next notation, then this note serves as note for discharge by case management. Current PCP: Ever Aldridge Patient: No    Chart Reviewed: Yes  Patient/ Family Interviewed: No    Initial assessment completed at bedside with: spoke with admissions at [facility]    Hospitalization in the last 30 days: Yes    Emergency Contacts:  Extended Emergency Contact Information  Primary Emergency Contact: [address] Phone: [phone]  Mobile Phone: [phone]  Relation: Brother/Sister  Secondary Emergency Contact: Scarlett Elmore  Mobile Phone: [phone]  Relation: None    Advance Directives:   Code Status: Full Code    Healthcare Power of : No  Agent: NA  Contact Number: NA    Financial  Payor: 'S ADMINISTRATION / Plan: 'S ADMINISTRATION / Product Type: *No Product type* /     Pre-cert required for SNF: Yes    Pharmacy    [place] [address]. Zachary [facility] [phone] - F [phone]  [address] [facility] [address]  Phone: [phone] Fax: [phone] Davison  [phone] Sabina Rear [phone]  [address] [place] [address]  Phone: [phone] Fax: [phone]      Potential assistance Purchasing Medications:    Does Patient want to participate in local refill/ meds to beds program?: Meds To Beds General Rules:  1. Can ONLY be done Monday- Friday between 8:30am-5pm  2. Prescription(s) must be in pharmacy by 3pm to be filled same day  3. Copy of patient's insurance/ prescription drug card and patient face sheet must be sent along with the prescription(s)  4. Cost of Rx cannot be added to hospital bill. If financial assistance is needed, please contact unit  or ;  or  CANNOT provide pharmacy voucher for patients co-pays  5. Patients can then  the prescription on their way out of the hospital at discharge, or pharmacy can deliver to the bedside if staff is available. (payment due at time of pick-up or delivery - cash, check, or card accepted)     Able to afford home medications/ co-pay costs: Yes    ADLS  Support Systems:      PT AM-PAC:   /24  OT AM-PAC:   /24    [place]: from SNF  Steps: none    Plans to RETURN to current housing: Yes  Barriers to RETURNING to current housing: would need approval      DISCHARGE PLAN:  Disposition: [facility] (SNF): [address] SNF Phone: [phone] Fax: [phone]    Transportation PLAN for discharge: EMS transportation     Factors facilitating achievement of predicted outcomes: Caregiver support    Barriers to discharge: EKOS    Additional Case Management Notes: NA    The Plan for Transition of Care is related to the following treatment goals of Bilateral pulmonary embolism ([address] [I26.99]    The Patient and/or patient representative [facility] [place] and his family were provided with a choice of provider and agrees with the discharge plan Yes    Freedom of choice list was provided with basic dialogue that supports the patient's individualized plan of care/goals and shares the quality data associated with the providers.  Yes    Care Transition patient: No    Shannan Curry RN  [facility]  Case Management Department  Ph: [phone]   Fax: [phone]

## 2021-04-23 NOTE — CONSULTS
Nephrology Consult Note                                                                                                                                                                                                                                                                                                                                                               Office : [phone]     Fax :[phone]              Patient's Name: Chris Mendes  10:32 AM  4/23/2021    Reason for Consult:  Pt with HARRY given Contrast  Requesting Physician:  [facility]      Chief Complaint: Shortness of breath     History of Present Ilness:    Chris Mendes is a 60 y.o. male with PMHx of HLD, HTN, sarcoidosis, and spinal cord injuries with surgical repair who presented to the ED for acute shortness of breath. He states he received 2 albuterol treatments at his nursing facility prior to arrival, without change in his symptoms. He has no chest pain or pressure. He has no new leg pain or swelling. He has been relatively immobile for the last couple of weeks. EMS found him to be hypoxic to ~85% on room air, improved with nonrebreather mask. He had mild tachycardia and a normal blood pressure for EMS. No other interventions were required. In the ED he was tachycardic, hypoxic, with clear lung sounds; concern for right heart strain on EKG, and an initial clinical picture concerning for pulmonary embolism. Creatinine was elevated, but it was decided the risk benefit ratio supported definitive imaging for PE, which was confirmed by radiology read. Of note, the patient was recently admitted to [facility] from his nursing facility for AMS, discharged on 4/19. Today the patient was seen at bedside, no acute distress. Patient appeared to have increased work of breathing but states he feels he is breathing well. He states feels very tired this morning because he hasn't been able to get any sleep.        Past Medical mentioned in HPI      Physical exam:     Vitals:  BP 95/70   Pulse 91   Temp 97.8 °F (36.6 °C) (Oral)   Resp 22   Wt 250 lb (113.4 kg)   SpO2 96%   BMI 34.87 kg/m²   Constitutional:  OAA X3 NAD  Skin: no rash, turgor wnl  Heent:  eomi, mmm  Neck: no bruits or jvd noted  Cardiovascular:  S1, S2 without m/r/g  Respiratory: Diminished breath sounds bilaterally; some crackles   Abdomen:  +bs, soft, nt, nd  Ext: 1+ lower extremity edema  Psychiatric: mood and affect appropriate  Musculoskeletal:  Rom, muscular strength intact    Data:   Labs:  CBC:   Recent Labs     04/23/21  0204 04/23/21  0744   WBC 13.5* 14.4*   HGB 14.7 13.5    240     BMP:    Recent Labs     04/23/21  0204 04/23/21  0744   * 129*   K 5.0 4.1   CL 92* 93*   CO2 20* 24   BUN 40* 45*   CREATININE 1.8* 2.0*   GLUCOSE 198* 199*     Ca/Mg/Phos:   Recent Labs     04/23/21  0204 04/23/21  0744   CALCIUM 9.6 9.2     Hepatic: No results for input(s): AST, ALT, ALB, BILITOT, ALKPHOS in the last 72 hours. Troponin:   Recent Labs     04/23/21  0204 04/23/21  0744   TROPONINI 0.24* 0.40*     BNP: No results for input(s): BNP in the last 72 hours. Lipids: No results for input(s): CHOL, TRIG, HDL, LDLCALC, LABVLDL in the last 72 hours. ABGs: No results for input(s): PHART, PO2ART, OBY4NJM in the last 72 hours. INR:   Recent Labs     04/23/21  0504   INR 1.94*     UA:No results for input(s): Haroldine Moe, GLUCOSEU, BILIRUBINUR, Loretha Merl, BLOODU, PHUR, PROTEINU, UROBILINOGEN, NITRU, LEUKOCYTESUR, Roxianne Shade in the last 72 hours. Urine Microscopic: No results for input(s): LABCAST, BACTERIA, COMU, HYALCAST, WBCUA, RBCUA, EPIU in the last 72 hours. Urine Culture: No results for input(s): LABURIN in the last 72 hours. Urine Chemistry: No results for input(s): Randol Balder, PROTEINUR, NAUR in the last 72 hours. IMAGING:  IR ANGIOGRAM PULMONARY BILATERAL   Final Result      1.  Satisfactory bilateral selective pulmonary artery lower lobe catheterization with pulmonary angiogram and EKOS TPA ultrasound catheter assisted delivery of TPA as described. 2. Rate: 6 hour infusion with 1 mg/h through each catheter has been scheduled. 3. Successful inferior vena cava gram.          CT CHEST PULMONARY EMBOLISM W CONTRAST   Final Result   1. Severe pulmonary emboli burden. Pulmonary emboli in the distal left    pulmonary artery, extending into the segmental and subsegmental pulmonary    arteries to the left upper lobe and left lower lobe. Pulmonary emboli in    the distal right pulmonary artery, extending into the segmental and    subsegmental pulmonary arteries to the right upper lobe, right middle    lobe, and right lower lobe. No definite saddle embolus identified. 2.  No aortic aneurysm or dissection. 3.  Right heart strain with RV/LV ratio greater than 1.   4.  No acute pulmonary parenchymal abnormality identified. XR CHEST PORTABLE   Final Result   Findings are suggestive of congestive heart failure. No infiltrate      VL Extremity Venous Bilateral    (Results Pending)       Assessment/Plan   1. HARRY   Likely 2/2 poor perfusion, however pt was noted to have an elevated Cr in ED and was given contrast due to risk of life threatening pulmonary embolism. Cr is 2.0 today from 1.8.  - IVF  - Urine studies   - RFP   - Expect to improve as perfusion improves     2. HTN  BP very low yesterday (SB 60s-70s); has improved this morning with SBP in 90s-100s. - Hold home BP meds   - Keep SBP >90       3.  Acid- base/ Electrolyte imbalance   Hyponatremia 129  - IV NS  - Replace as needed         Thank you for allowing us to participate in care of Praveena Nagy, MS4 acted as my scribe           Dr. Jeny Miller MD  Feel free to contact me   [facility] [address]  Office : [phone]  Fax :[phone]

## 2021-04-23 NOTE — PROGRESS NOTES
4 Eyes Admission Assessment     I agree as the admission nurse that 2 RN's have performed a thorough Head to Toe Skin Assessment on the patient. ALL assessment sites listed below have been assessed on admission. Areas assessed by both nurses: Elpidio Gash  [x]   Head, Face, and Ears   [x]   Shoulders, Back, and Chest  [x]   Arms, Elbows, and Hands   [x]   Coccyx, Sacrum, and Ischium  [x]   Legs, Feet, and Heels        Does the Patient have Skin Breakdown? No         Cesar Prevention initiated:  No   Wound Care Orders initiated:  No      [facility] nurse consulted for Pressure Injury (Stage 3,4, Unstageable, DTI, NWPT, and Complex wounds) or Cesar score 18 or lower:  No      Nurse 1 eSignature: Electronically signed by Bogdan Lopez RN on 4/23/21 at 7:24 AM EDT    **SHARE this note so that the co-signing nurse is able to place an eSignature**    Nurse 2 eSignature:  Elyce Bone [address] [facility]

## 2021-04-23 NOTE — ED NOTES
This RN, Charge RN, and Kennedy Valle RN at bedside with ICU residents and ER MD. TPA infusion started.      Nestor Coulter, RN  04/23/21 0935

## 2021-04-23 NOTE — PROGRESS NOTES
Occupational Therapy/Physical Therapy  Hold    Orders received and chart reviewed. Pt on strict bedrest w/ PE's. Pt will be evaluated once ax orders are updated pending pts status. Will f/u 4/24. RN notified and in agreement.      Chiki Morrison OTR/L  Mani Medina, [facility], DPT  [id number]

## 2021-04-23 NOTE — ED TRIAGE NOTES
Patient presents to ED with complaints of SOB since 1pm yesterday, patient was given 2 Albuterol treatments without relief. Normally on RA but now on 5L NC with frequent desaturations.

## 2021-04-23 NOTE — CONSULTS
ICU Consult Note       Hospital Day: 1  ICU Day: 1                                                        Code:Full Code  Admit Date: 4/23/2021  PCP: [facility]                                  CC: SOB    HISTORY OF PRESENT ILLNESS:     Tr Guerrero is a 60 y.o. male with a PMHx of HTN, HLD, sarcoidosis, chronic back pain/spinal cord injury with poor mobility who p/w sudden onset SOB from his nursing facility. Patient endorses SOB for the past 2 weeks intermittently, however last night patient became suddenly severely SOB while lying in bed and felt like he could not catch his breath. Patient received albuterol treatments at his nursing home prior to arrival with no improvement in his breathing. When EMS arrived they found to be hypoxic in the 80s on room air with improvement on a nonrebreather mask, tachypneic, blood pressure was normal and patient was slightly tachycardic. Patient does endorse being immobile for the past couple of weeks. He also endorses a \"worried\" headache. Patient states that he only takes his aspirin every now and then as he does not like taking blood thinners. Patient denies any orthopnea, chest pain with or without breathing, leg pain, palpitations, recent fever, nausea & vomiting, abdominal pain, dysuria, diarrhea, constipation. Patient stated that he quit smoking over 25 years ago. Denied any drug use or cocaine use.     In the ED, patient arrived hypotensive with blood pressure 77/59, tachycardic to 119, tachypneic to 36, satting 94% on 5 L of nasal cannula, afebrile to 99. Labs significant for sodium 128, BUN 40, creatinine 1.8 (baseline Cr 1.2) , glucose 198, proBNP 199, troponin 0.24, WBC 13.5 [11.6 ANC]. VBG with pH 7.48/35 point 4/37/26. 7. CXR revealed pulmonary vascular congestion. CTPA with severe pulmonary emboli burden.   Pulmonary emboli in the distal left pulmonary artery and in the distal right pulmonary artery with extension to segmental and subsegmental arteries. No definite saddle embolus. Patient's blood pressure did seem to come up a little to the low 100s however his blood pressure did dive back down to 59/29 and patient was subsequently started on tPA. IR was contacted and it was determined the patient will go to IR emergently for EKOS placement. Patient seen this morning at bedside. Reports complete resolution of shortness of breath. Denies fevers, chills, chest pain, abdominal pain, nausea and vomiting. He does have some tenderness at the catheter site and endorses some back pain which is chronic in nature.       PAST HISTORY:     Past Medical History:   Diagnosis Date    DJD (degenerative joint disease)     HYPERCHOLESTERAEMIA     Hypertension     Sarcoidosis     Spinal cord injuries      FALL       Past Surgical History:   Procedure Laterality Date    BACK SURGERY      COLONOSCOPY  12/24/2018    COLONOSCOPY POLYPECTOMY SNARE/COLD BIOPSY [facility]    OTHER SURGICAL HISTORY  05/01/2018    [facility]    UPPER GASTROINTESTINAL ENDOSCOPY  12/24/2018    COLONOSCOPY SUBMUCOSAL/BOTOX INJECTION performed by Justina Arora MD at [facility]:   The patient lives at nursing home. Alcohol: no HX use  Illicit drugs: no use  Tobacco:  Quit 6 years ago. Family History:  No family history on file. MEDICATIONS:     No current facility-administered medications on file prior to encounter. Current Outpatient Medications on File Prior to Encounter   Medication Sig Dispense Refill    lisinopril (PRINIVIL;ZESTRIL) 40 MG tablet Take 1 tablet by mouth daily Hold this medicine for now until you have follow-up with your regular physician. 30 tablet 3    chlorthalidone (HYGROTON) 25 MG tablet Take 1 tablet by mouth daily Hold this medicine until you have follow-up with your regular physician.  30 tablet 3    lidocaine 4 % external patch Place 1 patch onto the skin daily as needed (apply to shoulders if pain)      fluocinolone (SYNALAR) 0.025 % cream Apply topically 2 times daily as needed (itching related to psoriasis) Apply topically      hypromellose (ISOPTO TEARS) 0.5 % ophthalmic solution Place 1 drop into both eyes 4 times daily as needed (dry eyes)      senna-docusate (PERICOLACE) 8.6-50 MG per tablet Take 1-2 tablets by mouth 2 times daily as needed for Constipation      tacrolimus (PROTOPIC) 0.1 % ointment Apply topically 2 times daily as needed (apply topically to scalp if itching from psoriasis) Apply topically      vitamin E 400 UNIT capsule Take 400 Units by mouth daily      vitamin D (CHOLECALCIFEROL) 25 MCG (1000 UT) TABS tablet Take 1,000 Units by mouth daily       aspirin (ASPIRIN 81) 81 MG EC tablet Take 81 mg by mouth daily       albuterol sulfate  (90 Base) MCG/ACT inhaler Inhale 2 puffs into the lungs every 6 hours as needed for Shortness of Breath       acetaminophen (TYLENOL) 500 MG tablet Take 1,000 mg by mouth every 6 hours as needed (mild pain)            Scheduled Meds:   sodium chloride flush  5-40 mL Intravenous 2 times per day    sodium chloride flush  5-40 mL Intravenous 2 times per day    lidocaine  1 patch Transdermal Daily      Continuous Infusions:   heparin (PORCINE) Infusion Stopped (04/23/21 0553)    alteplase (ACTIVASE) 10mg in 0.9% sodium chloride infusion (EKOS catheter) 1 mg/hr (04/23/21 0620)    alteplase (ACTIVASE) 10mg in 0.9% sodium chloride infusion (EKOS catheter) 1 mg/hr (04/23/21 0618)    lactated ringers 150 mL/hr at 04/23/21 0712    sodium chloride      sodium chloride       PRN Meds:heparin (porcine), heparin (porcine), sodium chloride flush, promethazine **OR** ondansetron, polyethylene glycol, acetaminophen **OR** acetaminophen, sodium chloride flush, sodium chloride, sodium chloride    Allergies:    Allergies   Allergen Reactions    Baclofen Messes with my urine    Robaxin [Methocarbamol]      Irritates my stomach       REVIEW OF SYSTEMS:       History obtained from pt & chart review. Review of Systems:  10 point ROS performed & is negative unless indicated in HPI. PHYSICAL EXAM:       Vitals: BP (!) 61/42   Pulse 99 Comment: Has been in the low 100s  Temp 99 °F (37.2 °C) (Oral)   Resp 29   Wt 250 lb (113.4 kg)   SpO2 94%   BMI 34.87 kg/m²     I/O:  No intake or output data in the 24 hours ending 04/23/21 0830  No intake/output data recorded. No intake/output data recorded. Physical Examination:     Physical Exam:  Const: Well nourished, well developed, not distressed, not diaphoretic, appears stated age  Eyes: PERRL, EOMI, no conjunctival injection, no discharge  HENT: Normocephalic, atraumatic, oropharynx not erythematous, no postnasal drip  Neck: Supple, no JVD, no thyromegaly, trachea midline  CV: Regular rate, regular rhythm, heart sounds normal, no murmur, no gallop, no rub, capillary refill <2s, 2+ pulses in bilateral distal upper and lower extremities  RESP: Diminished bilaterally but without rales, wheezes and rhonchi, no chest wall tenderness   GI: soft, obese, non-tender, non-distended, no masses, no rebound, bowel sounds normal  MSK/Extremities: No gross deformities appreciated, no edema noted, no tenderness to palpation  Skin: Warm, dry. No rashes, no erythema  Neuro: Alert, CNs II-XII grossly intact. Sensation and motor function of extremities grossly intact. No abnormal tone. Psych: Appropriate mood and affect. No results for input(s): PHART, HNZ9JMR, PO2ART in the last 72 hours. DATA:       Labs:  CBC:   Recent Labs     04/23/21  0204   WBC 13.5*   HGB 14.7   HCT 45.2          BMP:   Recent Labs     04/23/21  0204   *   K 5.0   CL 92*   CO2 20*   BUN 40*   CREATININE 1.8*   GLUCOSE 198*     LFT's: No results for input(s): AST, ALT, ALB, BILITOT, ALKPHOS in the last 72 hours.   Troponin: Recent Labs     04/23/21  0204   TROPONINI 0.24*     BNP:No results for input(s): BNP in the last 72 hours. ABGs: No results for input(s): PHART, OMA4BUC, PO2ART in the last 72 hours. INR:   Recent Labs     04/23/21  0504   INR 1.94*       U/A:No results for input(s): NITRITE, COLORU, PHUR, LABCAST, WBCUA, RBCUA, MUCUS, TRICHOMONAS, YEAST, BACTERIA, CLARITYU, SPECGRAV, LEUKOCYTESUR, UROBILINOGEN, BILIRUBINUR, BLOODU, GLUCOSEU, AMORPHOUS in the last 72 hours. Invalid input(s): KETONESU    IR ANGIOGRAM PULMONARY BILATERAL   Final Result      1. Satisfactory bilateral selective pulmonary artery lower lobe catheterization with pulmonary angiogram and EKOS TPA ultrasound catheter assisted delivery of TPA as described. 2. Rate: 6 hour infusion with 1 mg/h through each catheter has been scheduled. 3. Successful inferior vena cava gram.          CT CHEST PULMONARY EMBOLISM W CONTRAST   Final Result   1. Severe pulmonary emboli burden. Pulmonary emboli in the distal left    pulmonary artery, extending into the segmental and subsegmental pulmonary    arteries to the left upper lobe and left lower lobe. Pulmonary emboli in    the distal right pulmonary artery, extending into the segmental and    subsegmental pulmonary arteries to the right upper lobe, right middle    lobe, and right lower lobe. No definite saddle embolus identified. 2.  No aortic aneurysm or dissection. 3.  Right heart strain with RV/LV ratio greater than 1.   4.  No acute pulmonary parenchymal abnormality identified. XR CHEST PORTABLE   Final Result   Findings are suggestive of congestive heart failure. No infiltrate      VL Extremity Venous Bilateral    (Results Pending)         ASSESSMENT AND PLAN:   Danis Welch is a 60 y.o. male, w/ PMHx of HTN, HLD, sarcoidosis, Chronic back pain/spinal cord injury with poor mobility who p/w sudden onset shortness of breath from his nursing facility.      Obstructive shock 2/2 massive PE's  Pt with chronic back pain and recent immobility for last couple of weeks. Sudden-onset SOB. New requirements of 5L NC on admission. Severe hypotension in the ED, required levophed. CTPA with severe bilateral pulmonary emboli burden, RV strain. - tPA 100mg over 2 hours started in ED, however was DC'd by IR when EKOS placed. Maybe got 25mg. - Pt got b/l EKOS by IR  - Heparin gtt to start in a few hours per IR  - IVF as below  - f/u bilateral LE venous dopplers to evaluate for DVT's  -O2, wean as tolerated     Bilateral massive PE's  -Plan as above    Acute hypoxic respiratory failure 2/2 massive PE  No O2 requirements at baseline. Satting 80's by EMS. Tachypneic, severely SOB & hypotensive in ED.  - 5LNC, wean as appropriate     HARRY - likely prerenal  Hyponatremia  BUN/Cr ratio >20, Cr 1.8 from BL 1.2. Na 128.  - cautious fluids given RV strain: 150ml/hr LR for 10hrs     NSTEMI type 2  2/2 RV strain from bilateral PE's. EKG with RBBB, no ischemic changes  - Trop 0.24  - Trend q4hr     Chronic back pain  Intermittently on percocets  - immobile  -Lidocaine patch given softer blood pressure     Hx of urinary retention  - bladder scan     Code Status:Full Code  FEN: NPO  PPX:  Heparin gtt  DISPO: ICU    This patient has been staffed and discussed with Bal.  -----------------------------  Ravi Bernard DO, PGY-1  4/23/2021  8:30 AM       Patient was seen, examined and discussed with Dr. Sol Haile. I agree with the history of present illness, past medical/surgical histories, family history, social history, medication list and allergies as listed. The review of systems is as noted above. My physical exam confirms the findings listed  Chart was reviewed including labs, CXR, CT scan, EKG, and medical records confirm the findings noted    Acute hypoxic respiratory failure   Acute bilateral PE with severe clot burden, hypotension, high RV/LV ratio, RBBB and tachycardia.   He also has positive troponin up to 0.4  HARRY creatinine is up to 2.0 from baseline of 1.2  Hyponatremia at 129    Received TPA (received about 25mg) followed by EKOS  Start heparin drip after EKOS. Check aPTT before starting heparin   Get lower extremities US  D/c IVF    Pt has a high probability of imminent or life-threatening deterioration requiring close monitoring, and highly complex decision-making and/or interventions of high intensity to assess, manipulate, and support his critical organ systems to prevent a likely inevitable decline which could occur if left untreated.      A total critical care time 55 minutes was used. This includes but not limited to examining patient, collaborating with other physicians, monitoring vital signs, telemetry, continuous pulse oximetry, and clinical response to IV medications, documentation time, review and interpretation of laboratory and radiological data, review of nursing notes and old record review.  This time excludes any time that may have been spent performing procedures for life threatening organ failure.

## 2021-04-23 NOTE — ED NOTES
Patient would like his sister (primary demographic contact) called following IR procedure. IR team notified.      Gildardo Dorman RN  04/23/21 2428

## 2021-04-23 NOTE — H&P
Internal Medicine PGY-1 Resident History & Physical      PCP: [facility]    Date of Admission: 4/23/2021    Date of Service: Pt seen/examined on 4/23/2021 and Admitted to Inpatient with expected LOS greater than two midnights due to medical therapy. Chief Complaint:  SOB      History Of Present Illness:     Yasmin Villeda is a 60 y.o. male with a PMHx of HTN, HLD, sarcoidosis, Chronic back pain/spinal cord injury with poor mobility who p/w sudden onset shortness of breath from his nursing facility. Patient states that he has been having shortness of breath for the past 2 weeks on and off. However last night patient became suddenly severely short of breath and felt like he could not catch his breath. Patient received albuterol treatments at his nursing home prior to arrival with no improvement in his breathing. When EMS arrived they found to be hypoxic in the 80s on room air with improvement on a nonrebreather mask, tachypneic, blood pressure was normal and patient was slightly tachycardic. Patient does endorse being immobile for the past couple of weeks. He also endorses a \"worried\" headache. Patient states that he only takes his aspirin every now and then as he does not like taking blood thinners. Patient denies any orthopnea, chest pain with or without breathing, leg pain, palpitations, recent fever, nausea/vomiting, abdominal pain, dysuria, diarrhea, constipation. Patient stated that he quit smoking over 25 years ago. Denied any drug use or cocaine use. In the ED, patient arrived hypotensive with blood pressure 77/59, tachycardic to 119, tachypneic to 36, satting 94% on 5 L of nasal cannula, afebrile to 99. Labs significant for sodium 128, BUN 40, creatinine 1.8, glucose 198, proBNP 199, troponin 0.24, WBC 13.5 [11.6 ANC]. VBG with pH 7.48/35 point 4/37/26.7. Chest x-ray performed showed pulmonary vascular congestion. CTPA with severe pulmonary emboli burden.   Pulmonary emboli in the distal left pulmonary artery and in the distal right pulmonary artery with extension to segmental and subsegmental arteries. No definite saddle embolus. Patient's blood pressure did seem to come up a little to the low 100s however his blood pressure did dive back down to 59/29 and patient was subsequently started on tPA. IR was contacted and it was determined the patient will go to IR emergently for EKOS placement. Past Medical History:          Diagnosis Date    DJD (degenerative joint disease)     HYPERCHOLESTERAEMIA     Hypertension     Sarcoidosis     Spinal cord injuries      FALL       Past Surgical History:          Procedure Laterality Date    BACK SURGERY      COLONOSCOPY  12/24/2018    COLONOSCOPY POLYPECTOMY SNARE/COLD BIOPSY performed by Rodger [facility]her MD at [facility]    OTHER SURGICAL HISTORY  05/01/2018    [facility]    UPPER GASTROINTESTINAL ENDOSCOPY  12/24/2018    COLONOSCOPY SUBMUCOSAL/BOTOX INJECTION performed by Rodger Maher MD at [address] [place], [place] [address]  ENDOSCOPY       Medications Prior to Admission:      Prior to Admission medications    Medication Sig Start Date End Date Taking? Authorizing Provider   lisinopril (PRINIVIL;ZESTRIL) 40 MG tablet Take 1 tablet by mouth daily Hold this medicine for now until you have follow-up with your regular physician. 4/17/21   Lottie Álvarez, DO   chlorthalidone (HYGROTON) 25 MG tablet Take 1 tablet by mouth daily Hold this medicine until you have follow-up with your regular physician.  4/17/21   Lottie Álvarez, DO   lidocaine 4 % external patch Place 1 patch onto the skin daily as needed (apply to shoulders if pain)    Historical Provider, MD   fluocinolone (SYNALAR) 0.025 % cream Apply topically 2 times daily as needed (itching related to psoriasis) Apply topically    Historical Provider, MD   hypromellose (ISOPTO TEARS) 0.5 % ophthalmic solution Place 1 drop into both eyes 4 times daily as needed (dry eyes)    Historical Provider, MD   senna-docusate (PERICOLACE) 8.6-50 MG per tablet Take 1-2 tablets by mouth 2 times daily as needed for Constipation    Historical Provider, MD   tacrolimus (PROTOPIC) 0.1 % ointment Apply topically 2 times daily as needed (apply topically to scalp if itching from psoriasis) Apply topically    Historical Provider, MD   vitamin E 400 UNIT capsule Take 400 Units by mouth daily    Historical Provider, MD   vitamin D (CHOLECALCIFEROL) 25 MCG (1000 UT) TABS tablet Take 1,000 Units by mouth daily     Historical Provider, MD   aspirin (ASPIRIN 81) 81 MG EC tablet Take 81 mg by mouth daily     Historical Provider, MD   albuterol sulfate  (90 Base) MCG/ACT inhaler Inhale 2 puffs into the lungs every 6 hours as needed for Shortness of Breath     Historical Provider, MD   acetaminophen (TYLENOL) 500 MG tablet Take 1,000 mg by mouth every 6 hours as needed (mild pain)     Historical Provider, MD       Allergies:  Baclofen and Robaxin [methocarbamol]    Social History:      The patient currently lives at nursing home    TOBACCO:   reports that he quit smoking about 6 years ago. He has never used smokeless tobacco.  ETOH:  reports no history of alcohol use. Family History:     No family history on file. REVIEW OF SYSTEMS: Pertinent positives as noted in the HPI. All other systems reviewed and negative. ROS: Review of Systems   Constitutional: Negative. Negative for fever. HENT: Negative. Eyes: Negative. Respiratory: Positive for shortness of breath. Negative for cough, chest tightness and wheezing. Cardiovascular: Negative. Negative for chest pain, palpitations and leg swelling. Gastrointestinal: Negative. Negative for abdominal pain, constipation, diarrhea, nausea and vomiting. Endocrine: Negative. Genitourinary: Negative. Negative for dysuria. Musculoskeletal: Positive for back pain. Skin: Negative. Neurological: Positive for headaches. Negative for dizziness and numbness. Psychiatric/Behavioral: Negative. PHYSICAL EXAM PERFORMED:    BP (!) 61/42   Pulse 99 Comment: Has been in the low 100s  Temp 99 °F (37.2 °C) (Oral)   Resp 29   Wt 250 lb (113.4 kg)   SpO2 94%   BMI 34.87 kg/m²     General appearance: Moderate apparent distress, feels he can't catch breath, on 5LNC, appears stated age and cooperative. HEENT:  Normal cephalic,atraumatic without obvious deformity. Pupils equal, round, and reactive to light. Extra ocular muscles intact. Conjunctivae/corneas clear. Neck: Supple, with full range of motion. No jugular venous distention. Trachea midline. Respiratory:  Tachypnic. Clear to auscultation, bilaterally without Rales/Wheezes/Rhonchi. Cardiovascular:  Tachycardic and normal rhythm with normal S1/S2 without murmurs, rubs or gallops. Abdomen: Soft, non-tender, non-distended with normal bowel sounds. Musculoskeletal:  No clubbing, cyanosis oredema bilaterally. Full range of motion without deformity. Back pain. Skin: Skin color, texture, turgor normal.  Norashes or lesions. Neurologic:  Neurovascularly intact without any focal sensory/motor deficits. Cranialnerves: II-XII intact, grossly non-focal.  Psychiatric:  Alert and oriented x4, thought content appropriate,normal insight  Capillary Refill: Brisk,< 3 seconds   Peripheral Pulses: +2 palpable, equal bilaterally       Labs:     Recent Labs     04/23/21  0204   WBC 13.5*   HGB 14.7   HCT 45.2        Recent Labs     04/23/21  0204   *   K 5.0   CL 92*   CO2 20*   BUN 40*   CREATININE 1.8*   CALCIUM 9.6     No results for input(s): AST, ALT, BILIDIR, BILITOT, ALKPHOS in the last 72 hours.   Recent Labs     04/23/21  0504   INR 1.94*     Recent Labs     04/23/21  0204   TROPONINI 0.24*       Urinalysis:      Lab Results   Component Value Date    NITRU Negative 04/15/2021    WBCUA 0-2 03/22/2019    RBCUA 3-5 03/22/2019    BLOODU Negative 04/15/2021    SPECGRAV >=1.030 04/15/2021    GLUCOSEU Negative 04/15/2021       Radiology:     CT CHEST PULMONARY EMBOLISM W CONTRAST   Final Result   1. Severe pulmonary emboli burden. Pulmonary emboli in the distal left    pulmonary artery, extending into the segmental and subsegmental pulmonary    arteries to the left upper lobe and left lower lobe. Pulmonary emboli in    the distal right pulmonary artery, extending into the segmental and    subsegmental pulmonary arteries to the right upper lobe, right middle    lobe, and right lower lobe. No definite saddle embolus identified. 2.  No aortic aneurysm or dissection. 3.  Right heart strain with RV/LV ratio greater than 1.   4.  No acute pulmonary parenchymal abnormality identified. XR CHEST PORTABLE   Final Result   Findings are suggestive of congestive heart failure. No infiltrate          ASSESSMENT & PLAN:  Violette Keane is a 60 y.o. male w/ PMHx of HTN, HLD, sarcoidosis, Chronic back pain/spinal cord injury with poor mobility who p/w sudden onset shortness of breath from his nursing facility. Acute hypoxic respiratory failure 2/2 PE's  No O2 requirements at baseline. Satting 80's by EMS. Tachypneic, severely SOB in ED.  - 5LNC    Obstructive shock 2/2 PE's  Pt with chronic back pain and recent immobility for last couple of weeks. Sudden-onset SOB. New requirements of 5L NC on admission. Severe hypotension in the ED, required Levophed. CTPA with severe bilateral pulmonary emboli burden, RV strain. - tPA 100mg over 2 hours started in ED, however was DC'd by IR when EKOS placed. Maybe got 25mg. - Pt got b/l EKOS by IR  - Heparin gtt to start in a few hours per IR  - IVF as below  - f/u bilateral LE venous dopplers to evaluate for DVT's    Bilateral Massive PE's  Hypotensive in the ED. Required Levophed temporarily by IR. RV strain on CT.  -Plan as above    HARRY - likely prerenal  Hyponatremia  BUN/Cr ratio >20, Cr 1.8 from BL 1.2.  Na 128.  - cautious fluids given RV strain: 150ml/hr LR for 10hrs    NSTEMI type 2  2/2 RV strain from bilateral PE's. EKG with RBBB, no ischemic changes  - Trop 0.24  - Trend q4hr    Chronic back pain  Intermittently on percocets  - immobile    Hx of urinary retention  - bladder scan      DVT Prophylaxis: Heparin gtt  Diet: NPO  Code Status: Full Code  PT/OT Eval Status: Pending  Dispo - ICU    I will discuss the patient with the senior resident and Feliciano Aguayo MD  Internal Medicine Resident, PGY-1  I saw the patient independently from the resident . I discussed the care with the resident. I personally reviewed the HPI, PH, FH, SH, ROS and medications. I repeated pertinent portions of the examination and reviewed the relevant imaging and laboratory data. I agree with the findings, assessment and plan as documented. addition to: Patient admitted for unstable pulmonary embolism requiring EKOS and ICU admission. Currently on directed TPA and heparin drip.

## 2021-04-23 NOTE — ED PROVIDER NOTES
[address] ENCOUNTER          ATTENDING PHYSICIAN NOTE       Date of evaluation: 4/23/2021    Chief Complaint     Shortness of Breath      History of Present Illness     Giovanny Kendall is a 60 y.o. male who presents to the emergency department via EMS for evaluation of acute shortness of breath that started just before arrival.  He received albuterol treatments at his nursing facility prior to arrival, without change in his symptoms. He has no chest pain or pressure. He has no new leg pain or swelling. He has been relatively immobile for the last couple of weeks. No fevers or chills. No cough or cold symptoms. No palpitations or headache. No known provoking or palliative features. He does feel moderately distressed by his symptoms, and does not know what is causing them. EMS found him to be hypoxic to ~85% on room air, improved with nonrebreather mask. He had mild tachycardia and a normal blood pressure for EMS. No other interventions were required. Review of Systems     Review of Systems   Constitutional: Negative for fever. HENT: Negative for nosebleeds and trouble swallowing. Respiratory: Negative for cough. Cardiovascular: Negative for leg swelling. Gastrointestinal: Negative for anal bleeding, blood in stool and vomiting. Genitourinary: Negative for hematuria. Hematological: Does not bruise/bleed easily. All other systems reviewed and are negative. Past Medical, Surgical, Family, and Social History     He has a past medical history of DJD (degenerative joint disease), HYPERCHOLESTERAEMIA, Hypertension, Sarcoidosis, and Spinal cord injuries. He has a past surgical history that includes back surgery; hernia repair; Gallbladder surgery; other surgical history (05/01/2018); Upper gastrointestinal endoscopy (12/24/2018); and Colonoscopy (12/24/2018). His family history is not on file. He reports that he quit smoking about 6 years ago.  He has never used smokeless tobacco. He reports that he does not drink alcohol or use drugs. Medications     Previous Medications    ACETAMINOPHEN (TYLENOL) 500 MG TABLET    Take 1,000 mg by mouth every 6 hours as needed (mild pain)     ALBUTEROL SULFATE  (90 BASE) MCG/ACT INHALER    Inhale 2 puffs into the lungs every 6 hours as needed for Shortness of Breath     ASPIRIN (ASPIRIN 81) 81 MG EC TABLET    Take 81 mg by mouth daily     CHLORTHALIDONE (HYGROTON) 25 MG TABLET    Take 1 tablet by mouth daily Hold this medicine until you have follow-up with your regular physician. FLUOCINOLONE (SYNALAR) 0.025 % CREAM    Apply topically 2 times daily as needed (itching related to psoriasis) Apply topically    HYPROMELLOSE (ISOPTO TEARS) 0.5 % OPHTHALMIC SOLUTION    Place 1 drop into both eyes 4 times daily as needed (dry eyes)    LIDOCAINE 4 % EXTERNAL PATCH    Place 1 patch onto the skin daily as needed (apply to shoulders if pain)    LISINOPRIL (PRINIVIL;ZESTRIL) 40 MG TABLET    Take 1 tablet by mouth daily Hold this medicine for now until you have follow-up with your regular physician. SENNA-DOCUSATE (PERICOLACE) 8.6-50 MG PER TABLET    Take 1-2 tablets by mouth 2 times daily as needed for Constipation    TACROLIMUS (PROTOPIC) 0.1 % OINTMENT    Apply topically 2 times daily as needed (apply topically to scalp if itching from psoriasis) Apply topically    VITAMIN D (CHOLECALCIFEROL) 25 MCG (1000 UT) TABS TABLET    Take 1,000 Units by mouth daily     VITAMIN E 400 UNIT CAPSULE    Take 400 Units by mouth daily       Allergies     He is allergic to baclofen and robaxin [methocarbamol].     Physical Exam     VITALS: /64   Pulse 120   Temp 99 °F (37.2 °C) (Oral)   Resp (!) 37   Wt 250 lb (113.4 kg)   SpO2 95%   BMI 34.87 kg/m²    General: Well developed, well nourished male moderate distress  HEENT: Sclera white, conjunctiva pink, mucous membranes moist and oropharynx clear  Neck: Supple, no meningismus or JVD  Respirations: Unlabored with symmetric chest rise and fall, lungs clear  CV: Tachycardic rate and regular rhythm with strong distal pulses  Abd: Soft without rebound guarding distention or focal tenderness  Musculoskeletal: Moves all extremities with no signs of orthopedic trauma or edema. No asymmetric calf swelling. Skin: Warm and dry with no rashes or lesions. Neuro: Awake and alert with no focal neurologic deficits. Normal speech and facial symmetry. Psych: Mood and affect are normal.    Diagnostic Results     EKG   Indication: Shortness of breath. Interpreted by me. Normal sinus rhythm, left axis deviation, rate is 118.  ms. QTc and [place] normal.  Right bundle branch block is present. Intervention: Sinus tachycardia with left axis deviation and right bundle branch block, concern for right heart strain. RADIOLOGY:  CT CHEST PULMONARY EMBOLISM W CONTRAST   Final Result   1. Severe pulmonary emboli burden. Pulmonary emboli in the distal left    pulmonary artery, extending into the segmental and subsegmental pulmonary    arteries to the left upper lobe and left lower lobe. Pulmonary emboli in    the distal right pulmonary artery, extending into the segmental and    subsegmental pulmonary arteries to the right upper lobe, right middle    lobe, and right lower lobe. No definite saddle embolus identified. 2.  No aortic aneurysm or dissection. 3.  Right heart strain with RV/LV ratio greater than 1.   4.  No acute pulmonary parenchymal abnormality identified. XR CHEST PORTABLE   Final Result   Findings are suggestive of congestive heart failure.   No infiltrate          LABS:   Results for orders placed or performed during the hospital encounter of 04/23/21   CBC Auto Differential   Result Value Ref Range    WBC 13.5 (H) 4.0 - 11.0 K/uL    RBC 5.40 4.20 - 5.90 M/uL    Hemoglobin 14.7 13.5 - 17.5 g/dL    Hematocrit 45.2 40.5 - 52.5 %    MCV 83.7 80.0 - 100.0 fL    MCH 27.2 26.0 - 34.0 pg MCHC 32.4 31.0 - 36.0 g/dL    RDW 15.0 12.4 - 15.4 %    Platelets 272 453 - 023 K/uL    MPV 7.7 5.0 - 10.5 fL    Neutrophils % 86.0 %    Lymphocytes % 5.0 %    Monocytes % 9.0 %    Eosinophils % 0.0 %    Basophils % 0.0 %    Neutrophils Absolute 11.6 (H) 1.7 - 7.7 K/uL    Lymphocytes Absolute 0.7 (L) 1.0 - 5.1 K/uL    Monocytes Absolute 1.2 0.0 - 1.3 K/uL    Eosinophils Absolute 0.0 0.0 - 0.6 K/uL    Basophils Absolute 0.0 0.0 - 0.2 K/uL   Basic Metabolic Panel w/ Reflex to MG   Result Value Ref Range    Sodium 128 (L) 136 - 145 mmol/L    Potassium reflex Magnesium 5.0 3.5 - 5.1 mmol/L    Chloride 92 (L) 99 - 110 mmol/L    CO2 20 (L) 21 - 32 mmol/L    Anion Gap 16 3 - 16    Glucose 198 (H) 70 - 99 mg/dL    BUN 40 (H) 7 - 20 mg/dL    CREATININE 1.8 (H) 0.8 - 1.3 mg/dL    GFR Non- 38 (A) >60    GFR  46 (A) >60    Calcium 9.6 8.3 - 10.6 mg/dL   Troponin   Result Value Ref Range    Troponin 0.24 (H) <0.01 ng/mL   Brain Natriuretic Peptide   Result Value Ref Range    Pro- (H) 0 - 124 pg/mL   Blood Gas, Venous   Result Value Ref Range    pH, Cheng 7.486 (H) 7.350 - 7.450    pCO2, Cheng 35.4 (L) 41.0 - 51.0 mmHg    pO2, Cheng 37.1 25 - 40 mmHg    HCO3, Venous 26.7 24.0 - 28.0 mmol/L    Base Excess, Cheng 3.5 (H) -2.0 - 3.0 mmol/L    O2 Sat, Cheng 72 Not established %    Carboxyhemoglobin 1.0 0.0 - 1.5 %    MetHgb, Cheng 0.3 0.0 - 1.5 %    TC02 (Calc), Cheng 28 mmol/L    Hemoglobin, Cheng, Reduced 27.30 %       RECENT VITALS:  BP: (!) 75/62, Temp: 99 °F (37.2 °C), Pulse: 105, Resp: 25, SpO2: 94 %       ED Course     Nursing Notes, Past Medical Hx, Past Surgical Hx, Social Hx, Allergies, and Family Hx were reviewed.     The patient was given the following medications:  Orders Placed This Encounter   Medications    ondansetron (ZOFRAN-ODT) disintegrating tablet 4 mg    iopamidol (ISOVUE-370) 76 % injection 80 mL    aspirin chewable tablet 324 mg    morphine (PF) injection 2 mg    heparin (porcine) injection 9,070 Units    heparin (porcine) injection 9,070 Units    heparin (porcine) injection 4,540 Units    heparin 25,000 units in dextrose 5% 250 mL (premix) infusion    alteplase (ACTIVASE) 100 MG injection     Goergiana Garcia: cabinet override    alteplase (ACTIVASE) injection 100 mg     ED Course as of Apr 23 0514   Fri Apr 23, 2021   0205 This BP considered spurious as it is lower than preceding and following, taken only a few minutes apart   BP(!): 77/59 [JM]   0302 2018 cardiac testing with EF 50% range. [JM]   0303 High concern for PE given overall clinical picture and abnormal labs. Risk/benefit supports CTPA despite elevated creatinine, as massive/submassive PE would change therapy considerations    [JM]   0443 Alerted by [employer] of CT findings. Request for ICU, IR, and hospitalist paged. Patient updated. Heparin ordered. [JM]   3263 I spoken to interventional radiology, who was assembling the team, and will come in for anticipated intervention. Most recent blood pressure less than 027 systolic. We will continue to monitor and, if sustained or if his overall condition changes, may consider peripheral administer TPA. Heart rate remains unchanged, oxygen requirement remains unchanged, and he remains awake and alert. Hospitalist is at bedside. [JM]   0504 Last 3 BP's <494 systolic. Will administer peripheral tpa. [JM]      ED Course User Index  [JM] Kiersten Tobar MD     He was seen and examined on arrival to the treatment area and frequently reassessed. He has remained awake and alert. He has not experienced a change in oxygen requirement. Blood pressures have started to decrease. Interventional radiology has been kept up-to-date, and still plans to take him for intervention. He will remain in the emergency department until that time. ICU team is at bedside.     CONSULTS:  IP CONSULT TO CRITICAL CARE  IP CONSULT TO INTERVENTIONAL RADIOLOGY  IP CONSULT TO HOSPITALIST    MEDICAL DECISION MAKING / ASSESSMENT / PLAN     Pierce Vyas is a 60 y.o. male presents to the emergency department with shortness of breath. He is tachycardic, hypoxic, with clear lung sounds, concern for right heart strain on EKG, and an initial clinical picture concerning for pulmonary embolism. Creatinine is elevated, but I felt the risk benefit ratio supported definitive imaging for PE, which was confirmed by radiology read. He has evidence of heart dysfunction in the form of an elevated troponin, elevated B natruretic peptide, EKG changes, and cardiac findings on his CT scan. Initially, with stable blood pressures greater than 100, he was felt stable for catheter directed TPA. However, as his blood pressures have started to acutely drop, we have initiated systemic thrombolytics. He has no contraindications to thrombolytics. He will remain in the emergency department until he is transferred to the interventional radiology team.    Critical Care:  Due to the immediate potential for life-threatening deterioration due to significant bilateral pulmonary emboli with hemodynamic dysfunction, I spent 45 minutes providing critical care. This time excludes time spent performing procedures but includes time spent on direct patient care, history retrieval, review of the chart, and discussions with patient, family, and consultant(s). Clinical Impression     1. Other acute pulmonary embolism with acute cor pulmonale ([facility] [address]    2.  Hypoxia        Disposition       DISPOSITION Decision To Admit 04/23/2021 04:36:57 AM          Reno Dumas MD  04/23/21 9486

## 2021-04-23 NOTE — ED NOTES
Patient monitored 1:1 with this RN at bedside. Patient still hypotensive (60s/40s) but is alert and talking.       Kathy Durbin RN  04/23/21 1546

## 2021-04-23 NOTE — ED NOTES
Patient's BP was 931T systolic, but now is 68T systolic. MD notified. Patient slower to respond but is responding appropriately.       Montez Kohler RN  04/23/21 5685

## 2021-04-23 NOTE — ED NOTES
Bed: A06-06  Expected date:   Expected time:   Means of arrival:   Comments:  Medic Melody Mcqueen RN  04/23/21 0145

## 2021-04-24 LAB
A/G RATIO: 1 (ref 1.1–2.2)
ALBUMIN SERPL-MCNC: 3.2 G/DL (ref 3.4–5)
ALP BLD-CCNC: 67 U/L (ref 40–129)
ALT SERPL-CCNC: 24 U/L (ref 10–40)
ANION GAP SERPL CALCULATED.3IONS-SCNC: 14 MMOL/L (ref 3–16)
ANTI-XA UNFRAC HEPARIN: 0.56 IU/ML (ref 0.3–0.7)
AST SERPL-CCNC: 22 U/L (ref 15–37)
BASOPHILS ABSOLUTE: 0.1 K/UL (ref 0–0.2)
BASOPHILS RELATIVE PERCENT: 0.4 %
BILIRUB SERPL-MCNC: 0.7 MG/DL (ref 0–1)
BUN BLDV-MCNC: 52 MG/DL (ref 7–20)
CALCIUM SERPL-MCNC: 8.7 MG/DL (ref 8.3–10.6)
CHLORIDE BLD-SCNC: 97 MMOL/L (ref 99–110)
CO2: 23 MMOL/L (ref 21–32)
CREAT SERPL-MCNC: 1.8 MG/DL (ref 0.8–1.3)
CREATININE URINE: 200 MG/DL (ref 39–259)
EKG ATRIAL RATE: 84 BPM
EKG DIAGNOSIS: NORMAL
EKG P AXIS: 47 DEGREES
EKG P-R INTERVAL: 182 MS
EKG Q-T INTERVAL: 448 MS
EKG QRS DURATION: 140 MS
EKG QTC CALCULATION (BAZETT): 529 MS
EKG R AXIS: -37 DEGREES
EKG T AXIS: 46 DEGREES
EKG VENTRICULAR RATE: 84 BPM
EOSINOPHILS ABSOLUTE: 0.2 K/UL (ref 0–0.6)
EOSINOPHILS RELATIVE PERCENT: 1.2 %
GFR AFRICAN AMERICAN: 46
GFR NON-AFRICAN AMERICAN: 38
GLOBULIN: 3.2 G/DL
GLUCOSE BLD-MCNC: 215 MG/DL (ref 70–99)
HCT VFR BLD CALC: 37.5 % (ref 40.5–52.5)
HEMOGLOBIN: 12.2 G/DL (ref 13.5–17.5)
LYMPHOCYTES ABSOLUTE: 0.7 K/UL (ref 1–5.1)
LYMPHOCYTES RELATIVE PERCENT: 5 %
MCH RBC QN AUTO: 27 PG (ref 26–34)
MCHC RBC AUTO-ENTMCNC: 32.5 G/DL (ref 31–36)
MCV RBC AUTO: 83.1 FL (ref 80–100)
MONOCYTES ABSOLUTE: 2.1 K/UL (ref 0–1.3)
MONOCYTES RELATIVE PERCENT: 15.3 %
NEUTROPHILS ABSOLUTE: 10.5 K/UL (ref 1.7–7.7)
NEUTROPHILS RELATIVE PERCENT: 78.1 %
PDW BLD-RTO: 15 % (ref 12.4–15.4)
PLATELET # BLD: 206 K/UL (ref 135–450)
PMV BLD AUTO: 7.5 FL (ref 5–10.5)
POTASSIUM REFLEX MAGNESIUM: 4.1 MMOL/L (ref 3.5–5.1)
RBC # BLD: 4.51 M/UL (ref 4.2–5.9)
SODIUM BLD-SCNC: 134 MMOL/L (ref 136–145)
TOTAL PROTEIN: 6.4 G/DL (ref 6.4–8.2)
WBC # BLD: 13.4 K/UL (ref 4–11)

## 2021-04-24 PROCEDURE — 36415 COLL VENOUS BLD VENIPUNCTURE: CPT

## 2021-04-24 PROCEDURE — 80053 COMPREHEN METABOLIC PANEL: CPT

## 2021-04-24 PROCEDURE — 6360000002 HC RX W HCPCS: Performed by: STUDENT IN AN ORGANIZED HEALTH CARE EDUCATION/TRAINING PROGRAM

## 2021-04-24 PROCEDURE — 2700000000 HC OXYGEN THERAPY PER DAY

## 2021-04-24 PROCEDURE — 94150 VITAL CAPACITY TEST: CPT

## 2021-04-24 PROCEDURE — 2580000003 HC RX 258: Performed by: STUDENT IN AN ORGANIZED HEALTH CARE EDUCATION/TRAINING PROGRAM

## 2021-04-24 PROCEDURE — 93010 ELECTROCARDIOGRAM REPORT: CPT | Performed by: INTERNAL MEDICINE

## 2021-04-24 PROCEDURE — 85520 HEPARIN ASSAY: CPT

## 2021-04-24 PROCEDURE — 99223 1ST HOSP IP/OBS HIGH 75: CPT | Performed by: INTERNAL MEDICINE

## 2021-04-24 PROCEDURE — 94761 N-INVAS EAR/PLS OXIMETRY MLT: CPT

## 2021-04-24 PROCEDURE — 6370000000 HC RX 637 (ALT 250 FOR IP): Performed by: INTERNAL MEDICINE

## 2021-04-24 PROCEDURE — 2580000003 HC RX 258: Performed by: RADIOLOGY

## 2021-04-24 PROCEDURE — 2060000000 HC ICU INTERMEDIATE R&B

## 2021-04-24 PROCEDURE — 6370000000 HC RX 637 (ALT 250 FOR IP): Performed by: STUDENT IN AN ORGANIZED HEALTH CARE EDUCATION/TRAINING PROGRAM

## 2021-04-24 PROCEDURE — 99233 SBSQ HOSP IP/OBS HIGH 50: CPT | Performed by: INTERNAL MEDICINE

## 2021-04-24 PROCEDURE — 83036 HEMOGLOBIN GLYCOSYLATED A1C: CPT

## 2021-04-24 PROCEDURE — 85025 COMPLETE CBC W/AUTO DIFF WBC: CPT

## 2021-04-24 RX ORDER — TRAMADOL HYDROCHLORIDE 50 MG/1
50 TABLET ORAL EVERY 6 HOURS PRN
Status: DISCONTINUED | OUTPATIENT
Start: 2021-04-24 | End: 2021-04-26 | Stop reason: HOSPADM

## 2021-04-24 RX ORDER — OXYCODONE HYDROCHLORIDE AND ACETAMINOPHEN 5; 325 MG/1; MG/1
1 TABLET ORAL EVERY 6 HOURS PRN
Status: DISCONTINUED | OUTPATIENT
Start: 2021-04-24 | End: 2021-04-26 | Stop reason: HOSPADM

## 2021-04-24 RX ADMIN — OXYCODONE HYDROCHLORIDE AND ACETAMINOPHEN 1 TABLET: 5; 325 TABLET ORAL at 16:26

## 2021-04-24 RX ADMIN — APIXABAN 10 MG: 5 TABLET, FILM COATED ORAL at 20:14

## 2021-04-24 RX ADMIN — OXYCODONE HYDROCHLORIDE AND ACETAMINOPHEN 1 TABLET: 5; 325 TABLET ORAL at 10:42

## 2021-04-24 RX ADMIN — OXYCODONE HYDROCHLORIDE AND ACETAMINOPHEN 1 TABLET: 5; 325 TABLET ORAL at 22:28

## 2021-04-24 RX ADMIN — Medication 10 ML: at 20:15

## 2021-04-24 RX ADMIN — TRAMADOL HYDROCHLORIDE 50 MG: 50 TABLET, COATED ORAL at 08:50

## 2021-04-24 RX ADMIN — HEPARIN SODIUM 18 UNITS/KG/HR: 10000 INJECTION, SOLUTION INTRAVENOUS at 04:40

## 2021-04-24 RX ADMIN — APIXABAN 10 MG: 5 TABLET, FILM COATED ORAL at 10:37

## 2021-04-24 RX ADMIN — Medication 5 ML: at 09:31

## 2021-04-24 RX ADMIN — Medication 10 ML: at 20:14

## 2021-04-24 ASSESSMENT — PAIN DESCRIPTION - ORIENTATION
ORIENTATION: RIGHT

## 2021-04-24 ASSESSMENT — PAIN DESCRIPTION - DESCRIPTORS
DESCRIPTORS: ACHING;THROBBING
DESCRIPTORS: ACHING

## 2021-04-24 ASSESSMENT — PAIN - FUNCTIONAL ASSESSMENT
PAIN_FUNCTIONAL_ASSESSMENT: PREVENTS OR INTERFERES SOME ACTIVE ACTIVITIES AND ADLS
PAIN_FUNCTIONAL_ASSESSMENT: PREVENTS OR INTERFERES SOME ACTIVE ACTIVITIES AND ADLS

## 2021-04-24 ASSESSMENT — PAIN DESCRIPTION - LOCATION
LOCATION: BACK

## 2021-04-24 ASSESSMENT — PAIN DESCRIPTION - ONSET: ONSET: ON-GOING

## 2021-04-24 ASSESSMENT — PAIN SCALES - GENERAL
PAINLEVEL_OUTOF10: 4
PAINLEVEL_OUTOF10: 8
PAINLEVEL_OUTOF10: 7
PAINLEVEL_OUTOF10: 7
PAINLEVEL_OUTOF10: 0
PAINLEVEL_OUTOF10: 3
PAINLEVEL_OUTOF10: 7

## 2021-04-24 ASSESSMENT — PAIN DESCRIPTION - PAIN TYPE
TYPE: CHRONIC PAIN

## 2021-04-24 ASSESSMENT — ENCOUNTER SYMPTOMS
ABDOMINAL PAIN: 0
WHEEZING: 0
CHOKING: 0
CHEST TIGHTNESS: 0
STRIDOR: 0
APNEA: 0
ABDOMINAL DISTENTION: 0
SHORTNESS OF BREATH: 1
COUGH: 0

## 2021-04-24 ASSESSMENT — PAIN DESCRIPTION - FREQUENCY
FREQUENCY: CONTINUOUS
FREQUENCY: CONTINUOUS

## 2021-04-24 ASSESSMENT — PAIN DESCRIPTION - PROGRESSION
CLINICAL_PROGRESSION: GRADUALLY WORSENING
CLINICAL_PROGRESSION: NOT CHANGED

## 2021-04-24 ASSESSMENT — PAIN DESCRIPTION - DIRECTION: RADIATING_TOWARDS: LEFT HIP

## 2021-04-24 NOTE — PLAN OF CARE
Problem: Falls - Risk of:  Goal: Will remain free from falls  Description: Will remain free from falls  4/24/2021 0919 by Leann Bradford RN  Outcome: Ongoing  4/24/2021 0452 by Deacon Arroyo RN  Outcome: Ongoing  Goal: Absence of physical injury  Description: Absence of physical injury  4/24/2021 0919 by Leann Bradford RN  Outcome: Ongoing  4/24/2021 0452 by Deacon Arroyo RN  Outcome: Ongoing     Problem: Pain:  Goal: Pain level will decrease  Description: Pain level will decrease  4/24/2021 0919 by Leann Bradford RN  Outcome: Ongoing  4/24/2021 0452 by Deacon Arroyo RN  Outcome: Ongoing  Goal: Control of acute pain  Description: Control of acute pain  4/24/2021 0919 by Leann Bradford RN  Outcome: Ongoing  4/24/2021 0452 by Deacon Arroyo RN  Outcome: Ongoing  Goal: Control of chronic pain  Description: Control of chronic pain  4/24/2021 0919 by Leann Bradford RN  Outcome: Ongoing  4/24/2021 0452 by Deacon Arroyo RN  Outcome: Ongoing     Problem: Infection - Central Venous Catheter-Associated Bloodstream Infection:  Goal: Will show no infection signs and symptoms  Description: Will show no infection signs and symptoms  4/24/2021 0919 by Leann Bradford RN  Outcome: Ongoing  4/24/2021 0452 by Deacon Arroyo RN  Outcome: Ongoing     Problem: Bleeding:  Goal: Will show no signs and symptoms of excessive bleeding  Description: Will show no signs and symptoms of excessive bleeding  4/24/2021 0919 by Leann Bradford RN  Outcome: Ongoing  4/24/2021 0452 by Deacon Arroyo RN  Outcome: Ongoing     Problem: Discharge Planning:  Goal: Discharged to appropriate level of care  Description: Discharged to appropriate level of care  4/24/2021 0919 by Leann Bradford RN  Outcome: Ongoing  4/24/2021 0452 by Deacon Arroyo RN  Outcome: Ongoing     Problem: Cardiac Output - Decreased:  Goal: Avoidance of environmental tobacco smoke  Description: Absence of orthostatic hypotension  4/24/2021 0919 by Leann Bradford RN  Outcome: Ongoing  4/24/2021 0452 by Rne Matias RN  Outcome: Ongoing  Goal: Cardiac output within specified parameters  Description: Cardiac output within specified parameters  4/24/2021 0919 by Carin Sutton RN  Outcome: Ongoing  4/24/2021 0452 by Ren Matias RN  Outcome: Ongoing  Goal: Hemodynamic stability will improve  Description: Hemodynamic stability will improve  4/24/2021 0919 by Carin Sutton RN  Outcome: Ongoing  4/24/2021 0452 by Ren Matias RN  Outcome: Ongoing     Problem: Fluid Volume - Imbalance:  Goal: Absence of imbalanced fluid volume signs and symptoms  Description: Absence of imbalanced fluid volume signs and symptoms  4/24/2021 0919 by Carin Sutton RN  Outcome: Ongoing  4/24/2021 0452 by Ren Matias RN  Outcome: Ongoing     Problem: Tissue Perfusion, Altered:  Goal: Circulatory function within specified parameters  Description: Circulatory function within specified parameters  4/24/2021 0919 by Carin Sutton RN  Outcome: Ongoing  4/24/2021 0452 by Ren Matias RN  Outcome: Ongoing     Problem: Tissue Perfusion - Cardiopulmonary, Altered:  Goal: Absence of angina  Description: Absence of angina  4/24/2021 0919 by Carin Sutton RN  Outcome: Ongoing  4/24/2021 0452 by Ren Matias RN  Outcome: Ongoing  Goal: Hemodynamic stability will improve  Description: Hemodynamic stability will improve  4/24/2021 0919 by Carin Sutton RN  Outcome: Ongoing  4/24/2021 0452 by Ren Matias RN  Outcome: Ongoing     Problem: Skin Integrity:  Goal: Will show no infection signs and symptoms  Description: Will show no infection signs and symptoms  4/24/2021 0919 by Carin Sutton RN  Outcome: Ongoing  4/24/2021 0452 by Ren Matias RN  Outcome: Ongoing  Goal: Absence of new skin breakdown  Description: Absence of new skin breakdown  4/24/2021 0919 by Carin Sutton RN  Outcome: Ongoing  4/24/2021 0452 by Ren Matias RN  Outcome: Ongoing

## 2021-04-24 NOTE — PROGRESS NOTES
ICU Progress Note    Admit Date: 4/23/2021  Diet: DIET GENERAL;    CC: SOB    Interval history: Patient was seen and examined at bedside. No acute events overnight. Continues to require 4-5L-NC. Patient does endorse shortness of breath and back pain. Denies any fevers, chills, nausea, vomiting, chest pain, abdominal pain, or any worsening lower extremity edema. Medications:     Scheduled Meds:   sodium chloride flush  5-40 mL Intravenous 2 times per day    sodium chloride flush  5-40 mL Intravenous 2 times per day    lidocaine  1 patch Transdermal Daily     Continuous Infusions:   heparin (PORCINE) Infusion 18 Units/kg/hr (04/24/21 0440)    sodium chloride       PRN Meds:heparin (porcine), heparin (porcine), sodium chloride flush, promethazine **OR** ondansetron, polyethylene glycol, acetaminophen **OR** acetaminophen, sodium chloride flush, sodium chloride    Objective:   Vitals:   T-max:  Patient Vitals for the past 8 hrs:   BP Temp Temp src Pulse Resp SpO2 Height Weight   04/24/21 0600 100/70 -- -- 75 29 99 % -- --   04/24/21 0500 90/61 -- -- 77 29 96 % -- --   04/24/21 0400 88/64 98.7 °F (37.1 °C) Oral 77 25 98 % 5' 11\" (1.803 m) 248 lb 6.3 oz (112.7 kg)   04/24/21 0300 95/69 -- -- 76 25 100 % -- --   04/24/21 0200 93/65 -- -- 75 27 98 % -- --   04/24/21 0100 (!) 91/59 -- -- 74 26 96 % -- --   04/24/21 0000 (!) 91/45 98.6 °F (37 °C) Oral 75 29 97 % -- --       Intake/Output Summary (Last 24 hours) at 4/24/2021 0732  Last data filed at 4/24/2021 0612  Gross per 24 hour   Intake 1606.1 ml   Output 675 ml   Net 931.1 ml       Review of Systems   Constitutional: Negative for activity change, appetite change, chills, diaphoresis, fatigue and fever. HENT: Negative for congestion. Respiratory: Positive for shortness of breath. Negative for apnea, cough, choking, chest tightness, wheezing and stridor. Cardiovascular: Negative for chest pain, palpitations and leg swelling.    Gastrointestinal: Negative for abdominal distention and abdominal pain. Genitourinary: Negative for difficulty urinating, dysuria and enuresis. Musculoskeletal: Negative for arthralgias. Neurological: Negative for dizziness, seizures, facial asymmetry, light-headedness, numbness and headaches. Psychiatric/Behavioral: Negative for agitation, behavioral problems and confusion. Physical Exam  Constitutional:       Appearance: He is obese. HENT:      Head: Normocephalic and atraumatic. Nose: Nose normal.      Mouth/Throat:      Mouth: Mucous membranes are moist.   Eyes:      Pupils: Pupils are equal, round, and reactive to light. Neck:      Musculoskeletal: Normal range of motion. Cardiovascular:      Rate and Rhythm: Tachycardia present. Pulmonary:      Effort: Respiratory distress present. Breath sounds: No wheezing or rales. Chest:      Chest wall: No tenderness. Skin:     General: Skin is warm. Neurological:      General: No focal deficit present. Mental Status: He is alert and oriented to person, place, and time. Psychiatric:         Mood and Affect: Mood normal.           LABS:    CBC:   Recent Labs     04/23/21  0744 04/23/21  1327 04/24/21  0415   WBC 14.4* 13.5* 13.4*   HGB 13.5 12.9* 12.2*   HCT 41.3 39.8* 37.5*    219 206   MCV 83.2 83.9 83.1     Renal:    Recent Labs     04/23/21  0204 04/23/21  0744 04/24/21  0415   * 129* 134*   K 5.0 4.1 4.1   CL 92* 93* 97*   CO2 20* 24 23   BUN 40* 45* 52*   CREATININE 1.8* 2.0* 1.8*   GLUCOSE 198* 199* 215*   CALCIUM 9.6 9.2 8.7   ANIONGAP 16 12 14     Hepatic:   Recent Labs     04/24/21  0415   AST 22   ALT 24   BILITOT 0.7   PROT 6.4   LABALBU 3.2*   ALKPHOS 67     Troponin:   Recent Labs     04/23/21  0204 04/23/21  0744 04/23/21  1102   TROPONINI 0.24* 0.40* 0.29*     BNP: No results for input(s): BNP in the last 72 hours. Lipids: No results for input(s): CHOL, HDL in the last 72 hours.     Invalid input(s): LDLCALCU, EKOS placed. Maybe got 25mg. - Pt got b/l EKOS by IR  - Continue Heparin gtt   - 5LNC     2. Lower extremity DVTs:  - Management as above    3. HARRY: likely pre-renal in the setting of hypotension and poor perfusion Cr 1.8 from 2.0 yesterday. - IVFs Dcd  - Bp improving. 4. Hyponatremia: Improving   - 128->129->134    5. NSTEMI type 2:   - Troponin downtrending     Code Status: Full  FEN: General  PPX: Hep gtt  DISPO: ICU     Fidel Martel MD, PGY-3  04/24/21  7:32 AM    This patient will be staffed and discussed with Sol Trevino MD    Patient was seen, examined and discussed with Dr. Adela Cervantes. I agree with the interval history. My physical exam confirms the findings listed below  Chart was reviewed including labs and medical records confirm the findings noted    Acute hypoxic respiratory failure - better. Acute bilateral PE with severe clot burden, hypotension, high RV/LV ratio, RBBB and tachycardia. He also has positive troponin up to 0.4  HARRY creatinine start trending down  Hyponatremia - improving   Leukocytosis at 13.4     Received TPA (received about 25mg) followed by EKOS, then started on heparin drip.    Now on 5 liters O2 with sats of 100%  LE US with popliteal DVTs  Switch heparin to Eliquis  Wean FiO2 to keep sats > 92  OK to transfer to the floor

## 2021-04-24 NOTE — PROGRESS NOTES
Hospitalist Progress Note      PCP: Herminio Guerrero [id number]    Date of Admission: 4/23/2021    Chief Complaint: Shortness of breath    Hospital Course: 78-year-old male admitted to the hospital shortness of breath. Found to have bilateral PE    Subjective: Patient reports significant improvement in his breathing since admission. Denies any chest pain, nausea or vomiting this morning. Feels okay      Medications:  Reviewed    Infusion Medications    sodium chloride       Scheduled Medications    apixaban  10 mg Oral BID    Followed by   Sarah Lima ON 5/1/2021] apixaban  5 mg Oral BID    sodium chloride flush  5-40 mL Intravenous 2 times per day    sodium chloride flush  5-40 mL Intravenous 2 times per day    lidocaine  1 patch Transdermal Daily     PRN Meds: traMADol, oxyCODONE-acetaminophen, sodium chloride flush, promethazine **OR** ondansetron, polyethylene glycol, sodium chloride flush, sodium chloride      Intake/Output Summary (Last 24 hours) at 4/24/2021 1151  Last data filed at 4/24/2021 0932  Gross per 24 hour   Intake 2056.1 ml   Output 675 ml   Net 1381.1 ml       Physical Exam Performed:    /70   Pulse 83   Temp 97.6 °F (36.4 °C) (Oral)   Resp 23   Ht 5' 11\" (1.803 m)   Wt 248 lb 6.3 oz (112.7 kg)   SpO2 100%   BMI 34.64 kg/m²     General appearance: No apparent distress, appears stated age and cooperative. HEENT: Pupils equal, round, and reactive to light. Conjunctivae/corneas clear. Neck: Supple, with full range of motion. No jugular venous distention. Trachea midline. Respiratory:  Normal respiratory effort. Clear to auscultation, bilaterally without Rales/Wheezes/Rhonchi. Cardiovascular: Regular rate and rhythm with normal S1/S2 without murmurs, rubs or gallops. Abdomen: Soft, non-tender, non-distended with normal bowel sounds. Musculoskeletal: No clubbing, cyanosis or edema bilaterally. Full range of motion without deformity.   Skin: Skin color, texture, turgor normal.  No rashes or lesions. Neurologic:  Generalized weakness with bilateral lower extremity weakness as well  Psychiatric: Alert and oriented, thought content appropriate, normal insight  Capillary Refill: Brisk,3 seconds, normal   Peripheral Pulses: +2 palpable, equal bilaterally       Labs:   Recent Labs     04/23/21  0744 04/23/21  1327 04/24/21  0415   WBC 14.4* 13.5* 13.4*   HGB 13.5 12.9* 12.2*   HCT 41.3 39.8* 37.5*    219 206     Recent Labs     04/23/21  0204 04/23/21  0744 04/24/21  0415   * 129* 134*   K 5.0 4.1 4.1   CL 92* 93* 97*   CO2 20* 24 23   BUN 40* 45* 52*   CREATININE 1.8* 2.0* 1.8*   CALCIUM 9.6 9.2 8.7     Recent Labs     04/24/21  0415   AST 22   ALT 24   BILITOT 0.7   ALKPHOS 67     Recent Labs     04/23/21  0504   INR 1.94*     Recent Labs     04/23/21  0204 04/23/21  0744 04/23/21  1102   TROPONINI 0.24* 0.40* 0.29*       Urinalysis:      Lab Results   Component Value Date    NITRU Negative 04/15/2021    WBCUA 0-2 03/22/2019    RBCUA 3-5 03/22/2019    BLOODU Negative 04/15/2021    SPECGRAV >=1.030 04/15/2021    GLUCOSEU Negative 04/15/2021       Radiology:  VL Extremity Venous Bilateral         IR ANGIOGRAM PULMONARY BILATERAL   Final Result      1. Satisfactory bilateral selective pulmonary artery lower lobe catheterization with pulmonary angiogram and EKOS TPA ultrasound catheter assisted delivery of TPA as described. 2. Rate: 6 hour infusion with 1 mg/h through each catheter has been scheduled. 3. Successful inferior vena cava gram.          CT CHEST PULMONARY EMBOLISM W CONTRAST   Final Result   1. Severe pulmonary emboli burden. Pulmonary emboli in the distal left    pulmonary artery, extending into the segmental and subsegmental pulmonary    arteries to the left upper lobe and left lower lobe.   Pulmonary emboli in    the distal right pulmonary artery, extending into the segmental and    subsegmental pulmonary arteries to the right upper lobe, right middle    lobe, and

## 2021-04-24 NOTE — PROGRESS NOTES
Shift assessment completed. Pt c/o lower back pain but able to rest. Pt repositioned without issue. VSS, safety precautions in place. RN will cont to monitor.

## 2021-04-24 NOTE — PROGRESS NOTES
Patient transferred to [facility] 7702  Patient alert and orient x4  [place] to room  Bed low with wheels locked  Call light in reach  Side rails up x2

## 2021-04-24 NOTE — PLAN OF CARE
Problem: Falls - Risk of:  Goal: Will remain free from falls  Description: Will remain free from falls  4/24/2021 1455 by Mile Reyes RN  Note: Call light in reach  bed low   wheels locked  bed alarm on  side rails up x2  continue fall prevention measures     Problem: Pain:  Description: Pain management should include both nonpharmacologic and pharmacologic interventions.   Goal: Control of chronic pain  Description: Control of chronic pain  4/24/2021 1455 by Mile Reyes RN  Note: Patient reports chronic back pain with little pain reduction after percocet per prn order  patient refusing tramadol per prn order stating \" I can't take that without having diarrhea\"  assist with repositioning prn  continue assess pain     Problem: Bleeding:  Goal: Will show no signs and symptoms of excessive bleeding  Description: Will show no signs and symptoms of excessive bleeding  4/24/2021 1455 by Mile Reyes RN  Note: Patient without s/s active bleeding  continue assess     Problem: Cardiac Output - Decreased:  Goal: Hemodynamic stability will improve  Description: Hemodynamic stability will improve  4/24/2021 1455 by Mile Reyes RN  Note: VSS  NSR tele  skin warm and dry  peripheral pulses palpable  supplemental oygen 3L per NC  patient LADD  continue tele monitoring, VS every 4hr and prn  attempt wean oxygen

## 2021-04-24 NOTE — PROGRESS NOTES
The pt is currently resting in bed with eyes closed. The pt is alert and oriented x4, and follows commands appropriately when instructed. The pt remains afebrile and does complain of chronic back pain that radiates to his L hip. This RN notified Dr. Nanci Abebe of pt's pain at [place] [address]. The pt's SpO2 is 98%, with good pleth and pulse correlation, read from a L finger SpO2 probe. The pt remains on 5L O2 via NC, and his respirations are unlabored with symmetrical chest expansion noted. The pt's HR and rhythm is SR with a rate in the 70's-80's, and his SBP has ranged from 90's-110's, with a MAP always >65. The pt has an external male catheter in place. Pt is a fall risk. Fall risk protocol in place. See Anneliese Saavedrabury Fall Score. Pt bed is in low position, bed alarm is on, side rails up, fall risk bracelet applied. , non-skid footwear in use. Patient/family educated on fall risk protocol, instructed to call for assistance when needed and belongings are in reach. assistance. Will continue with hourly rounds for po intake, pain needs, toileting and repositioning as needed. Will continue to monitor for needs. Pt at risk for skin breakdown. See Cesar score. Pt remains on bedrest. Unable to reposition self in bed. Heels elevated off bed. Sacral heart mepilex intact to protect,  site inspected and intact underneath. Will continue to turn and reposition patient every two hours and as needed. Will continue to keep patient clean and dry, applying skin care cream as needed. Pillows used for repositioning q2hs. Will continue to monitor and assess for skin breakdown. The pt was updated on his plan of care for the day at 0830. The pt has no further questions at this time. Continuing to monitor pt.

## 2021-04-25 LAB
ALBUMIN SERPL-MCNC: 3.5 G/DL (ref 3.4–5)
ANION GAP SERPL CALCULATED.3IONS-SCNC: 11 MMOL/L (ref 3–16)
BUN BLDV-MCNC: 35 MG/DL (ref 7–20)
CALCIUM SERPL-MCNC: 9.9 MG/DL (ref 8.3–10.6)
CHLORIDE BLD-SCNC: 92 MMOL/L (ref 99–110)
CO2: 26 MMOL/L (ref 21–32)
CREAT SERPL-MCNC: 1.4 MG/DL (ref 0.8–1.3)
ESTIMATED AVERAGE GLUCOSE: 177.2 MG/DL
GFR AFRICAN AMERICAN: >60
GFR NON-AFRICAN AMERICAN: 51
GLUCOSE BLD-MCNC: 207 MG/DL (ref 70–99)
GLUCOSE BLD-MCNC: 210 MG/DL (ref 70–99)
GLUCOSE BLD-MCNC: 222 MG/DL (ref 70–99)
GLUCOSE BLD-MCNC: 232 MG/DL (ref 70–99)
HBA1C MFR BLD: 7.8 %
PERFORMED ON: ABNORMAL
PHOSPHORUS: 2.1 MG/DL (ref 2.5–4.9)
POTASSIUM REFLEX MAGNESIUM: 3.8 MMOL/L (ref 3.5–5.1)
SODIUM BLD-SCNC: 129 MMOL/L (ref 136–145)

## 2021-04-25 PROCEDURE — 6370000000 HC RX 637 (ALT 250 FOR IP): Performed by: STUDENT IN AN ORGANIZED HEALTH CARE EDUCATION/TRAINING PROGRAM

## 2021-04-25 PROCEDURE — 97530 THERAPEUTIC ACTIVITIES: CPT

## 2021-04-25 PROCEDURE — 6370000000 HC RX 637 (ALT 250 FOR IP): Performed by: INTERNAL MEDICINE

## 2021-04-25 PROCEDURE — 97162 PT EVAL MOD COMPLEX 30 MIN: CPT

## 2021-04-25 PROCEDURE — 82040 ASSAY OF SERUM ALBUMIN: CPT

## 2021-04-25 PROCEDURE — 2580000003 HC RX 258: Performed by: RADIOLOGY

## 2021-04-25 PROCEDURE — 83036 HEMOGLOBIN GLYCOSYLATED A1C: CPT

## 2021-04-25 PROCEDURE — 97535 SELF CARE MNGMENT TRAINING: CPT

## 2021-04-25 PROCEDURE — 97116 GAIT TRAINING THERAPY: CPT

## 2021-04-25 PROCEDURE — 2060000000 HC ICU INTERMEDIATE R&B

## 2021-04-25 PROCEDURE — 80048 BASIC METABOLIC PNL TOTAL CA: CPT

## 2021-04-25 PROCEDURE — 36415 COLL VENOUS BLD VENIPUNCTURE: CPT

## 2021-04-25 PROCEDURE — 97167 OT EVAL HIGH COMPLEX 60 MIN: CPT

## 2021-04-25 PROCEDURE — 94760 N-INVAS EAR/PLS OXIMETRY 1: CPT

## 2021-04-25 PROCEDURE — 99233 SBSQ HOSP IP/OBS HIGH 50: CPT | Performed by: INTERNAL MEDICINE

## 2021-04-25 PROCEDURE — 84100 ASSAY OF PHOSPHORUS: CPT

## 2021-04-25 PROCEDURE — 2580000003 HC RX 258: Performed by: STUDENT IN AN ORGANIZED HEALTH CARE EDUCATION/TRAINING PROGRAM

## 2021-04-25 RX ORDER — INSULIN LISPRO 100 [IU]/ML
0-3 INJECTION, SOLUTION INTRAVENOUS; SUBCUTANEOUS NIGHTLY
Status: DISCONTINUED | OUTPATIENT
Start: 2021-04-25 | End: 2021-04-26 | Stop reason: HOSPADM

## 2021-04-25 RX ORDER — DEXTROSE MONOHYDRATE 50 MG/ML
100 INJECTION, SOLUTION INTRAVENOUS PRN
Status: DISCONTINUED | OUTPATIENT
Start: 2021-04-25 | End: 2021-04-26 | Stop reason: HOSPADM

## 2021-04-25 RX ORDER — INSULIN LISPRO 100 [IU]/ML
0-6 INJECTION, SOLUTION INTRAVENOUS; SUBCUTANEOUS
Status: DISCONTINUED | OUTPATIENT
Start: 2021-04-25 | End: 2021-04-26 | Stop reason: HOSPADM

## 2021-04-25 RX ORDER — NICOTINE POLACRILEX 4 MG
15 LOZENGE BUCCAL PRN
Status: DISCONTINUED | OUTPATIENT
Start: 2021-04-25 | End: 2021-04-26 | Stop reason: HOSPADM

## 2021-04-25 RX ORDER — DEXTROSE MONOHYDRATE 25 G/50ML
12.5 INJECTION, SOLUTION INTRAVENOUS PRN
Status: DISCONTINUED | OUTPATIENT
Start: 2021-04-25 | End: 2021-04-26 | Stop reason: HOSPADM

## 2021-04-25 RX ADMIN — Medication 10 ML: at 20:11

## 2021-04-25 RX ADMIN — APIXABAN 10 MG: 5 TABLET, FILM COATED ORAL at 20:30

## 2021-04-25 RX ADMIN — Medication 10 ML: at 09:10

## 2021-04-25 RX ADMIN — OXYCODONE HYDROCHLORIDE AND ACETAMINOPHEN 1 TABLET: 5; 325 TABLET ORAL at 05:00

## 2021-04-25 RX ADMIN — Medication 10 ML: at 20:10

## 2021-04-25 RX ADMIN — OXYCODONE HYDROCHLORIDE AND ACETAMINOPHEN 1 TABLET: 5; 325 TABLET ORAL at 18:41

## 2021-04-25 RX ADMIN — INSULIN LISPRO 2 UNITS: 100 INJECTION, SOLUTION INTRAVENOUS; SUBCUTANEOUS at 17:29

## 2021-04-25 RX ADMIN — INSULIN LISPRO 1 UNITS: 100 INJECTION, SOLUTION INTRAVENOUS; SUBCUTANEOUS at 22:05

## 2021-04-25 RX ADMIN — OXYCODONE HYDROCHLORIDE AND ACETAMINOPHEN 1 TABLET: 5; 325 TABLET ORAL at 11:30

## 2021-04-25 RX ADMIN — INSULIN LISPRO 2 UNITS: 100 INJECTION, SOLUTION INTRAVENOUS; SUBCUTANEOUS at 13:22

## 2021-04-25 RX ADMIN — APIXABAN 10 MG: 5 TABLET, FILM COATED ORAL at 09:09

## 2021-04-25 ASSESSMENT — PAIN DESCRIPTION - FREQUENCY
FREQUENCY: CONTINUOUS

## 2021-04-25 ASSESSMENT — PAIN DESCRIPTION - ORIENTATION: ORIENTATION: RIGHT

## 2021-04-25 ASSESSMENT — PAIN DESCRIPTION - LOCATION
LOCATION: BACK

## 2021-04-25 ASSESSMENT — PAIN SCALES - GENERAL
PAINLEVEL_OUTOF10: 8
PAINLEVEL_OUTOF10: 7
PAINLEVEL_OUTOF10: 7
PAINLEVEL_OUTOF10: 8

## 2021-04-25 ASSESSMENT — PAIN DESCRIPTION - DESCRIPTORS
DESCRIPTORS: ACHING

## 2021-04-25 ASSESSMENT — PAIN DESCRIPTION - PROGRESSION
CLINICAL_PROGRESSION: NOT CHANGED
CLINICAL_PROGRESSION: GRADUALLY WORSENING
CLINICAL_PROGRESSION: NOT CHANGED

## 2021-04-25 ASSESSMENT — PAIN - FUNCTIONAL ASSESSMENT: PAIN_FUNCTIONAL_ASSESSMENT: PREVENTS OR INTERFERES SOME ACTIVE ACTIVITIES AND ADLS

## 2021-04-25 ASSESSMENT — PAIN DESCRIPTION - PAIN TYPE: TYPE: CHRONIC PAIN

## 2021-04-25 ASSESSMENT — PAIN DESCRIPTION - ONSET
ONSET: ON-GOING
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
## 2021-04-25 NOTE — PLAN OF CARE
Problem: Falls - Risk of:  Goal: Will remain free from falls  Description: Will remain free from falls  Note: Call light in reach at all times  bed low with wheels locked  bed alarm on  OOB with use of gait belt, front wheel walker and max assist of staff x2  gripper socks when OOB  continue fall prevention measures     Problem: Pain:  Description: Pain management should include both nonpharmacologic and pharmacologic interventions.   Goal: Control of chronic pain  Description: Control of chronic pain  Note: Little or no pain reduction with pharmaceutical/nonpharmaceutial treatment  continue assess pain     Problem: Bleeding:  Goal: Will show no signs and symptoms of excessive bleeding  Description: Will show no signs and symptoms of excessive bleeding  Outcome: Met This Shift     Problem: Discharge Planning:  Goal: Discharged to appropriate level of care  Description: Discharged to appropriate level of care  Note: Patient refusing to return to SNF at discharge  patient requesting to discharge to personal residence  discussed with patient therapy eval, scoring tool used for safety of discharge plan, reminded patient with two staff he is max assist for stand and pivot, he is frequently requesting staff to assist with repositioning/raise lower head of bed  patient states \" it will be different for me at home  I can get by better\"  will continue to encourage discharge to snf for safety purposes     Problem: Cardiac Output - Decreased:  Goal: Hemodynamic stability will improve  Description: Hemodynamic stability will improve  Note: VSS  alert and orient x4  NSR tele  skin warm and dry  peripheral pulses palpable  remains LADD but room air pulse ox mid 90's  will continue monitor and assess

## 2021-04-25 NOTE — PROGRESS NOTES
Hospitalist Progress Note      PCP: Herminio Guerrero [id number]    Date of Admission: 4/23/2021    Chief Complaint: Shortness of breath    Hospital Course: 59-year-old male admitted to the hospital shortness of breath. Found to have bilateral PE    Subjective: No acute events reported overnight. Patient feels okay this morning. Off oxygen. States his breathing is much better today      Medications:  Reviewed    Infusion Medications    dextrose      sodium chloride       Scheduled Medications    insulin lispro  0-6 Units Subcutaneous TID WC    insulin lispro  0-3 Units Subcutaneous Nightly    apixaban  10 mg Oral BID    Followed by   Dmitri Mcnulty ON 5/1/2021] apixaban  5 mg Oral BID    sodium chloride flush  5-40 mL Intravenous 2 times per day    sodium chloride flush  5-40 mL Intravenous 2 times per day    lidocaine  1 patch Transdermal Daily     PRN Meds: glucose, dextrose, glucagon (rDNA), dextrose, traMADol, oxyCODONE-acetaminophen, sodium chloride flush, promethazine **OR** ondansetron, polyethylene glycol, sodium chloride flush, sodium chloride      Intake/Output Summary (Last 24 hours) at 4/25/2021 1340  Last data filed at 4/25/2021 0503  Gross per 24 hour   Intake 720 ml   Output 750 ml   Net -30 ml       Physical Exam Performed:    /75   Pulse 78   Temp 98 °F (36.7 °C) (Oral)   Resp 18   Ht 5' 11\" (1.803 m)   Wt 248 lb 6.3 oz (112.7 kg)   SpO2 92%   BMI 34.64 kg/m²     General appearance: No apparent distress, appears stated age and cooperative. HEENT: Pupils equal, round, and reactive to light. Conjunctivae/corneas clear. Neck: Supple, with full range of motion. No jugular venous distention. Trachea midline. Respiratory:  Normal respiratory effort. Clear to auscultation, bilaterally without Rales/Wheezes/Rhonchi. Cardiovascular: Regular rate and rhythm with normal S1/S2 without murmurs, rubs or gallops.   Abdomen: Soft, non-tender, non-distended with normal bowel sounds. Musculoskeletal: No clubbing, cyanosis or edema bilaterally. Full range of motion without deformity. Skin: Skin color, texture, turgor normal.  No rashes or lesions. Neurologic: Generalized weakness with bilateral lower extremity weakness as well  Psychiatric: Alert and oriented, thought content appropriate, normal insight  Capillary Refill: Brisk,3 seconds, normal   Peripheral Pulses: +2 palpable, equal bilaterally       Labs:   Recent Labs     04/23/21  0744 04/23/21  1327 04/24/21  0415   WBC 14.4* 13.5* 13.4*   HGB 13.5 12.9* 12.2*   HCT 41.3 39.8* 37.5*    219 206     Recent Labs     04/23/21  0744 04/24/21  0415 04/25/21  1049   * 134* 129*   K 4.1 4.1 3.8   CL 93* 97* 92*   CO2 24 23 26   BUN 45* 52* 35*   CREATININE 2.0* 1.8* 1.4*   CALCIUM 9.2 8.7 9.9   PHOS  --   --  2.1*     Recent Labs     04/24/21  0415   AST 22   ALT 24   BILITOT 0.7   ALKPHOS 67     Recent Labs     04/23/21  0504   INR 1.94*     Recent Labs     04/23/21  0204 04/23/21  0744 04/23/21  1102   TROPONINI 0.24* 0.40* 0.29*       Urinalysis:      Lab Results   Component Value Date    NITRU Negative 04/15/2021    WBCUA 0-2 03/22/2019    RBCUA 3-5 03/22/2019    BLOODU Negative 04/15/2021    SPECGRAV >=1.030 04/15/2021    GLUCOSEU Negative 04/15/2021       Radiology:  VL Extremity Venous Bilateral   Final Result      IR ANGIOGRAM PULMONARY BILATERAL   Final Result      1. Satisfactory bilateral selective pulmonary artery lower lobe catheterization with pulmonary angiogram and EKOS TPA ultrasound catheter assisted delivery of TPA as described. 2. Rate: 6 hour infusion with 1 mg/h through each catheter has been scheduled. 3. Successful inferior vena cava gram.          CT CHEST PULMONARY EMBOLISM W CONTRAST   Final Result   1. Severe pulmonary emboli burden.   Pulmonary emboli in the distal left    pulmonary artery, extending into the segmental and subsegmental pulmonary    arteries to the left upper lobe and left lower lobe. Pulmonary emboli in    the distal right pulmonary artery, extending into the segmental and    subsegmental pulmonary arteries to the right upper lobe, right middle    lobe, and right lower lobe. No definite saddle embolus identified. 2.  No aortic aneurysm or dissection. 3.  Right heart strain with RV/LV ratio greater than 1.   4.  No acute pulmonary parenchymal abnormality identified. XR CHEST PORTABLE   Final Result   Findings are suggestive of congestive heart failure. No infiltrate      VASCULAR REPORT    (Results Pending)           Assessment/Plan:    Acute hypoxic respiratory failure secondary to bilateral pulmonary embolism  Bilateral lower extremity DVT  -S/p EKOS by IR  -Continue Eliquis    HARRY  -Nephrology following  -continue IV fluids. Creatinine improving    Hyponatremia  -Continue to monitor    Hyperglycemia due to diabetes mellitus  -Hemoglobin A1c 7.8  -Continue insulin sliding scale inpatient    DVT Prophylaxis: Eliquis  Diet: DIET GENERAL;  Code Status: Full Code    Dispo -patient refusing to go back to the SNF. States that he would want to go home however there is concerned about him being able to care for himself. Continue monitoring patient today. Await further improvement in hyponatremia. Anticipate discharge likely tomorrow. Noted to have new onset diabetes as well will need education regarding that as well.     [facility]– [place], [place]

## 2021-04-25 NOTE — PROGRESS NOTES
Physician Progress Note      PATIENT:               Romel Skinner  CSN #:                  [id number]  :                       1955  ADMIT DATE:       2021 1:45 AM  DISCH DATE:  RESPONDING  PROVIDER #:        Colton Hernandez MD          QUERY TEXT:    Pt admitted with acute hypoxic respiratory failure. Pt noted to have acute   pulmonary embolism w/right heart strain. If possible, please document in the   progress notes and discharge summary if you are evaluating and / or treating   any of the following: The medical record reflects the following:  Risk Factors: PMHx of HTN, HLD, sarcoidosis, Chronic back pain/spinal cord   injury with poor mobility  Clinical Indicators: p/w sudden onset shortness of breath from his nursing   facility. Patient states that he has been having shortness of breath for the   past 2 weeks on. Hypotension, tachypnea, right heart strain  Treatment: EKOS, monitor, ICU admission  Options provided:  -- Pulmonary Embolism with Acute Cor Pulmonale  -- Pulmonary Embolism without Acute Cor Pulmonale  -- Other - I will add my own diagnosis  -- Disagree - Not applicable / Not valid  -- Disagree - Clinically unable to determine / Unknown  -- Refer to Clinical Documentation Reviewer    PROVIDER RESPONSE TEXT:    This patient has Pulmonary Embolism with acute cor pulmonale.     Query created by: Vinod Foster on 2021 4:13 PM      Electronically signed by:  Colton Hernandez MD 2021 10:11 PM

## 2021-04-25 NOTE — PROGRESS NOTES
Problem: Falls - Risk of:  Goal: Will remain free from falls  Description: Will remain free from falls  Note: Call light in reach  bed low   wheels locked  bed alarm on  side rails up x2  continue fall prevention measures     Problem: Pain:  Description: Pain management should include both nonpharmacologic and pharmacologic interventions.   Goal: Control of chronic pain  Description: Control of chronic pain  Note: Patient reports chronic back pain with little pain reduction after percocet per prn order       Problem: Bleeding:  Goal: Will show no signs and symptoms of excessive bleeding  Description: Will show no signs and symptoms of excessive bleeding  Note: Patient without s/s active bleeding  continue assess     Problem: Cardiac Output - Decreased:  Goal: Hemodynamic stability will improve  Description: Hemodynamic stability will improve  Note: VSS  NSR tele  skin warm and dry  peripheral pulses palpable  supplemental oygen 2L per NC  patient LADD  continue tele monitoring, VS every 4hr and prn  attempt wean oxygen

## 2021-04-25 NOTE — PROGRESS NOTES
Nephrology  Note                                                                                                                                                                                                                                                                                                                                                               Office : [phone]     Fax :[phone]              Patient's Name: Xuan Brunson    Reason for Consult:  Pt with HARRY given Contrast  Requesting Physician:  [facility]      Chief Complaint: Shortness of breath     04/25/21     Good UO   Cr stable   No N/V/D  SOB stable       Past Medical History:   Diagnosis Date    DJD (degenerative joint disease)     HYPERCHOLESTERAEMIA     Hypertension     Sarcoidosis     Spinal cord injuries      FALL       Past Surgical History:   Procedure Laterality Date    BACK SURGERY      COLONOSCOPY  12/24/2018    COLONOSCOPY POLYPECTOMY SNARE/COLD BIOPSY performed by Hannah Sanchez MD at [facility]    OTHER SURGICAL HISTORY  05/01/2018    [facility] [place]    UPPER GASTROINTESTINAL ENDOSCOPY  12/24/2018    COLONOSCOPY SUBMUCOSAL/BOTOX INJECTION performed by Hannah Sanchez MD at [facility]       No family history on file. reports that he quit smoking about 6 years ago. He has never used smokeless tobacco. He reports that he does not drink alcohol or use drugs.     Allergies:  Baclofen and Robaxin [methocarbamol]    Current Medications:    traMADol (ULTRAM) tablet 50 mg, Q6H PRN  oxyCODONE-acetaminophen (PERCOCET) 5-325 MG per tablet 1 tablet, Q6H PRN  apixaban (ELIQUIS) tablet 10 mg, BID    Followed by  Ayleen Lopez ON 5/1/2021] apixaban (ELIQUIS) tablet 5 mg, BID  sodium chloride flush 0.9 % injection 5-40 mL, 2 times per day  sodium chloride flush 0.9 % injection 5-40 mL, PRN  promethazine (PHENERGAN) tablet 12.5 mg, Q6H PRN Or  ondansetron (ZOFRAN) injection 4 mg, Q6H PRN  polyethylene glycol (GLYCOLAX) packet 17 g, Daily PRN  sodium chloride flush 0.9 % injection 5-40 mL, 2 times per day  sodium chloride flush 0.9 % injection 5-40 mL, PRN  0.9 % sodium chloride infusion, PRN  lidocaine 4 % external patch 1 patch, Daily        Review of Systems:   14 point ROS obtained but were negative except mentioned in HPI      Physical exam:     Vitals:  /65   Pulse 90   Temp 97.5 °F (36.4 °C) (Oral)   Resp 18   Ht 5' 11\" (1.803 m)   Wt 248 lb 6.3 oz (112.7 kg)   SpO2 99%   BMI 34.64 kg/m²   Constitutional:  OAA X3 NAD  Skin: no rash, turgor wnl  Heent:  eomi, mmm  Neck: no bruits or jvd noted  Cardiovascular:  S1, S2 without m/r/g  Respiratory: Diminished breath sounds bilaterally; some crackles   Abdomen:  +bs, soft, nt, nd  Ext: 1+ lower extremity edema  Psychiatric: mood and affect appropriate  Musculoskeletal:  Rom, muscular strength intact    Data:   Labs:  CBC:   Recent Labs     04/23/21  0744 04/23/21  1327 04/24/21  0415   WBC 14.4* 13.5* 13.4*   HGB 13.5 12.9* 12.2*    219 206     BMP:    Recent Labs     04/23/21  0204 04/23/21  0744 04/24/21  0415   * 129* 134*   K 5.0 4.1 4.1   CL 92* 93* 97*   CO2 20* 24 23   BUN 40* 45* 52*   CREATININE 1.8* 2.0* 1.8*   GLUCOSE 198* 199* 215*     Ca/Mg/Phos:   Recent Labs     04/23/21  0204 04/23/21  0744 04/24/21  0415   CALCIUM 9.6 9.2 8.7     Hepatic:   Recent Labs     04/24/21  0415   AST 22   ALT 24   BILITOT 0.7   ALKPHOS 67     Troponin:   Recent Labs     04/23/21  0204 04/23/21  0744 04/23/21  1102   TROPONINI 0.24* 0.40* 0.29*     BNP: No results for input(s): BNP in the last 72 hours. Lipids: No results for input(s): CHOL, TRIG, HDL, LDLCALC, LABVLDL in the last 72 hours. ABGs: No results for input(s): PHART, PO2ART, XYZ5AKR in the last 72 hours.   INR:   Recent Labs     04/23/21  0504   INR 1.94*     UA:No results for input(s): Ruth Ann Aus, GLUCOSEU, Nita Koko, UROBILINOGEN, NITRU, LEUKOCYTESUR, Smith Kristin in the last 72 hours. Urine Microscopic: No results for input(s): LABCAST, BACTERIA, COMU, HYALCAST, WBCUA, RBCUA, EPIU in the last 72 hours. Urine Culture: No results for input(s): LABURIN in the last 72 hours. Urine Chemistry:   Recent Labs     04/23/21  1327   LABCREA 200.0   NAUR <20             IMAGING:  VL Extremity Venous Bilateral   Final Result      IR ANGIOGRAM PULMONARY BILATERAL   Final Result      1. Satisfactory bilateral selective pulmonary artery lower lobe catheterization with pulmonary angiogram and EKOS TPA ultrasound catheter assisted delivery of TPA as described. 2. Rate: 6 hour infusion with 1 mg/h through each catheter has been scheduled. 3. Successful inferior vena cava gram.          CT CHEST PULMONARY EMBOLISM W CONTRAST   Final Result   1. Severe pulmonary emboli burden. Pulmonary emboli in the distal left    pulmonary artery, extending into the segmental and subsegmental pulmonary    arteries to the left upper lobe and left lower lobe. Pulmonary emboli in    the distal right pulmonary artery, extending into the segmental and    subsegmental pulmonary arteries to the right upper lobe, right middle    lobe, and right lower lobe. No definite saddle embolus identified. 2.  No aortic aneurysm or dissection. 3.  Right heart strain with RV/LV ratio greater than 1.   4.  No acute pulmonary parenchymal abnormality identified. XR CHEST PORTABLE   Final Result   Findings are suggestive of congestive heart failure. No infiltrate      VASCULAR REPORT    (Results Pending)       Assessment/Plan   1. HARRY   Likely 2/2 poor perfusion, however pt was noted to have an elevated Cr in ED and was given contrast due to risk of life threatening pulmonary embolism. - d/c IVF   - Urine studies - reviewed   - RFP   - Expect to improve as perfusion improves     2.  HTN  BP stable   - Hold home BP meds   - Keep SBP >90       3.  Acid- base/ Electrolyte imbalance   Hyponatremia  - better             Dr. Rosas Roca MD  Feel free to contact me   [facility] [address]  Office : [phone]  Fax :[phone]
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
## 2021-04-26 VITALS
WEIGHT: 248.39 LBS | DIASTOLIC BLOOD PRESSURE: 79 MMHG | BODY MASS INDEX: 34.77 KG/M2 | TEMPERATURE: 96.3 F | HEIGHT: 71 IN | OXYGEN SATURATION: 96 % | HEART RATE: 73 BPM | RESPIRATION RATE: 18 BRPM | SYSTOLIC BLOOD PRESSURE: 121 MMHG

## 2021-04-26 LAB
ANION GAP SERPL CALCULATED.3IONS-SCNC: 10 MMOL/L (ref 3–16)
BUN BLDV-MCNC: 22 MG/DL (ref 7–20)
CALCIUM SERPL-MCNC: 9.7 MG/DL (ref 8.3–10.6)
CHLORIDE BLD-SCNC: 98 MMOL/L (ref 99–110)
CO2: 26 MMOL/L (ref 21–32)
CREAT SERPL-MCNC: 1.1 MG/DL (ref 0.8–1.3)
ESTIMATED AVERAGE GLUCOSE: 174.3 MG/DL
GFR AFRICAN AMERICAN: >60
GFR NON-AFRICAN AMERICAN: >60
GLUCOSE BLD-MCNC: 151 MG/DL (ref 70–99)
GLUCOSE BLD-MCNC: 169 MG/DL (ref 70–99)
GLUCOSE BLD-MCNC: 178 MG/DL (ref 70–99)
GLUCOSE BLD-MCNC: 179 MG/DL (ref 70–99)
HBA1C MFR BLD: 7.7 %
PERFORMED ON: ABNORMAL
POTASSIUM REFLEX MAGNESIUM: 3.8 MMOL/L (ref 3.5–5.1)
SODIUM BLD-SCNC: 134 MMOL/L (ref 136–145)

## 2021-04-26 PROCEDURE — 2580000003 HC RX 258: Performed by: STUDENT IN AN ORGANIZED HEALTH CARE EDUCATION/TRAINING PROGRAM

## 2021-04-26 PROCEDURE — 6370000000 HC RX 637 (ALT 250 FOR IP): Performed by: STUDENT IN AN ORGANIZED HEALTH CARE EDUCATION/TRAINING PROGRAM

## 2021-04-26 PROCEDURE — 94760 N-INVAS EAR/PLS OXIMETRY 1: CPT

## 2021-04-26 PROCEDURE — 6370000000 HC RX 637 (ALT 250 FOR IP): Performed by: INTERNAL MEDICINE

## 2021-04-26 PROCEDURE — 36415 COLL VENOUS BLD VENIPUNCTURE: CPT

## 2021-04-26 PROCEDURE — 2580000003 HC RX 258: Performed by: RADIOLOGY

## 2021-04-26 PROCEDURE — 99232 SBSQ HOSP IP/OBS MODERATE 35: CPT | Performed by: INTERNAL MEDICINE

## 2021-04-26 PROCEDURE — 80048 BASIC METABOLIC PNL TOTAL CA: CPT

## 2021-04-26 RX ORDER — BISACODYL 10 MG
10 SUPPOSITORY, RECTAL RECTAL DAILY PRN
Status: DISCONTINUED | OUTPATIENT
Start: 2021-04-26 | End: 2021-04-26 | Stop reason: HOSPADM

## 2021-04-26 RX ORDER — GLUCOSAMINE HCL/CHONDROITIN SU 500-400 MG
CAPSULE ORAL
Qty: 200 STRIP | Refills: 0 | Status: SHIPPED | OUTPATIENT
Start: 2021-04-26

## 2021-04-26 RX ORDER — OXYCODONE HYDROCHLORIDE AND ACETAMINOPHEN 5; 325 MG/1; MG/1
1 TABLET ORAL EVERY 6 HOURS PRN
Qty: 10 TABLET | Refills: 0 | Status: SHIPPED | OUTPATIENT
Start: 2021-04-26 | End: 2021-04-29

## 2021-04-26 RX ADMIN — OXYCODONE HYDROCHLORIDE AND ACETAMINOPHEN 1 TABLET: 5; 325 TABLET ORAL at 15:57

## 2021-04-26 RX ADMIN — OXYCODONE HYDROCHLORIDE AND ACETAMINOPHEN 1 TABLET: 5; 325 TABLET ORAL at 00:38

## 2021-04-26 RX ADMIN — OXYCODONE HYDROCHLORIDE AND ACETAMINOPHEN 1 TABLET: 5; 325 TABLET ORAL at 09:40

## 2021-04-26 RX ADMIN — INSULIN LISPRO 1 UNITS: 100 INJECTION, SOLUTION INTRAVENOUS; SUBCUTANEOUS at 13:22

## 2021-04-26 RX ADMIN — Medication 10 ML: at 09:35

## 2021-04-26 RX ADMIN — POLYETHYLENE GLYCOL 3350 17 G: 17 POWDER, FOR SOLUTION ORAL at 02:47

## 2021-04-26 RX ADMIN — Medication 10 ML: at 09:36

## 2021-04-26 RX ADMIN — APIXABAN 10 MG: 5 TABLET, FILM COATED ORAL at 09:35

## 2021-04-26 RX ADMIN — BISACODYL 10 MG: 10 SUPPOSITORY RECTAL at 02:47

## 2021-04-26 ASSESSMENT — PAIN SCALES - GENERAL
PAINLEVEL_OUTOF10: 8
PAINLEVEL_OUTOF10: 9
PAINLEVEL_OUTOF10: 3
PAINLEVEL_OUTOF10: 8
PAINLEVEL_OUTOF10: 8

## 2021-04-26 ASSESSMENT — PAIN DESCRIPTION - LOCATION: LOCATION: BACK

## 2021-04-26 ASSESSMENT — PAIN DESCRIPTION - PROGRESSION
CLINICAL_PROGRESSION: NOT CHANGED

## 2021-04-26 ASSESSMENT — PAIN DESCRIPTION - DESCRIPTORS: DESCRIPTORS: ACHING;HEADACHE

## 2021-04-26 ASSESSMENT — PAIN DESCRIPTION - ONSET: ONSET: ON-GOING

## 2021-04-26 ASSESSMENT — PAIN DESCRIPTION - FREQUENCY: FREQUENCY: CONTINUOUS

## 2021-04-26 ASSESSMENT — PAIN DESCRIPTION - PAIN TYPE
TYPE: CHRONIC PAIN
TYPE: CHRONIC PAIN

## 2021-04-26 ASSESSMENT — PAIN DESCRIPTION - ORIENTATION: ORIENTATION: RIGHT;LOWER

## 2021-04-26 NOTE — PROGRESS NOTES
Physician Progress Note      PATIENT:               Rosalia Gutierrez  CSN #:                  [id number]  :                       1955  ADMIT DATE:       4/15/2021 10:40 AM  DISCH DATE:        2021 5:38 PM  RESPONDING  PROVIDER #:        Buddy Julio DO          QUERY TEXT:    Patient admitted with HARRY. Noted documentation of Aspiration pna in PN . In order to support the diagnosis of aspiration pna, please include additional   clinical indicators in your documentation. Or please document if the   diagnosis of aspiration pna has been ruled out after further study. The medical record reflects the following:  Risk Factors: 61 yo W AMS from SNF  Clinical Indicators: Per PN : Acute hypoxic respiratory failure 2/2   aspiration pneumonitis. Per CXR 4/15: There is elevation of the right   hemidiaphragm which is stable. The lungs are clear. No pneumothorax or   effusion. Treatment: Patient is not on any antibiotics  Options provided:  -- Aspiration PNA was ruled out  -- Aspiration PNA present as evidenced by, Please document evidence. -- Other - I will add my own diagnosis  -- Disagree - Not applicable / Not valid  -- Disagree - Clinically unable to determine / Unknown  -- Refer to Clinical Documentation Reviewer    PROVIDER RESPONSE TEXT:    Aspiration PNA was ruled out after study. Query created by: Columba Solano on 2021 7:12 AM      QUERY TEXT:    Patient admitted with HARRY. Noted documentation of acute respiratory failure in   PN . In order to support the diagnosis of acute respiratory failure,   please include additional clinical indicators in your documentation. Or   please document if the diagnosis of acute respiratory failure has been ruled   out after further study. The medical record reflects the following:  Risk Factors: 60 o w/ HARRY  Clinical Indicators: Per PN : Acute hypoxic respiratory failure 2/2   aspiration pneumonitis. Per ED: No respiratory distress.   Per IM 4/18:   Hypoxia at facility, in Ed 94% on room air. Per Consult 4/16: patient denies   any sob  Treatment: patient on 2L 02 for less than an hour    Acute Respiratory Failure Clinical Indicators per  MS-DRG Training Guide and   Quick Reference Guide:  pO2 < 60 mmHg or SpO2 (pulse oximetry) < 91% breathing room air  pCO2 > 50 and pH < 7.35  P/F ratio (pO2 / FIO2) < 300  pO2 decrease or pCO2 increase by 10 mmHg from baseline (if known)  Supplemental oxygen of 40% or more  Presence of respiratory distress, tachypnea, dyspnea, shortness of breath,   wheezing  Unable to speak in complete sentences  Use of accessory muscles to breathe  Extreme anxiety and feeling of impending doom  Tripod position  Confusion/altered mental status/obtunded  Options provided:  -- Acute Respiratory Failure ruled out after study  -- Acute Respiratory Failure as evidenced by, Please document evidence. -- Other - I will add my own diagnosis  -- Disagree - Not applicable / Not valid  -- Disagree - Clinically unable to determine / Unknown  -- Refer to Clinical Documentation Reviewer    PROVIDER RESPONSE TEXT:    Acute Respiratory Failure has been ruled out after study. Query created by: Zac Stratton on 4/20/2021 7:13 AM      QUERY TEXT:    Patient admitted with HARRY and noted to have SIRS: temp 101.3,and RR 22. If   possible, please document in progress notes and discharge summary if you are   evaluating and/or treating any of the following:     The medical record reflects the following:  Risk Factors: 61 yo w/ HARRY  Clinical Indicators: Per H&P: HARRY, appears prerenal. 4/19 temp 101.1, HR 91.    4/18: Temp 101.6,   Treatment: UA, vitals, Nephrology consult  Options provided:  -- SIRS of non-infectious origin with HARRY  -- Other - I will add my own diagnosis  -- Disagree - Not applicable / Not valid  -- Disagree - Clinically unable to determine / Unknown  -- Refer to Clinical Documentation Reviewer    PROVIDER RESPONSE TEXT:    This patient has SIRS of non-infectious origin with HARRY.     Query created by: Ingrid Quintanilla on 4/19/2021 6:06 AM      Electronically signed by:  Andrea Guzman DO 4/26/2021 10:01 AM

## 2021-04-26 NOTE — DISCHARGE INSTR - COC
Continuity of Care Form    Patient Name: Debora García   :    MRN:  [id number]    Admit date:  2021  Discharge date:  2021    Code Status Order: Full Code   Advance Directives:     Admitting Physician:  Vaibhav Ac DO  PCP: Herminio Guerrero [id number]    Discharging Nurse: [address] Unit/Room#: 4602/1499-92  Discharging Unit Phone Number: [phone]    Emergency Contact:   Extended Emergency Contact Information  Primary Emergency Contact: [address] Phone: [phone]  Mobile Phone: [phone]  Relation: Brother/Sister  Secondary Emergency Contact: Pat Contreras  Mobile Phone: [phone]  Relation: None    Past Surgical History:  Past Surgical History:   Procedure Laterality Date    BACK SURGERY      COLONOSCOPY  2018    COLONOSCOPY POLYPECTOMY SNARE/COLD BIOPSY performed [facility]    OTHER SURGICAL HISTORY  2018    Kphoplasty    UPPER GASTROINTESTINAL ENDOSCOPY  2018    COLONOSCOPY SUBMUCOSAL/BOTOX INJECTION performed by Tyler Brower MD at [facility] ENDOSCOPY       Immunization History: There is no immunization history on file for this patient.     Active Problems:  Patient Active Problem List   Diagnosis Code    Right-sided chest wall pain R07.89    Pneumonia J18.9    Sarcoidosis D86.9    Syncope, near R55    Rectal bleeding K62.5    Diverticulosis K57.90    HTN (hypertension) I10    Bright red rectal bleeding K62.5    Hypertensive emergency I16.1    Headache R51.9    HARRY (acute kidney injury) ([facility] [address] N17.9    Hypoxia R09.02    Right-sided low back pain without sciatica M54.5    Hypotension due to hypovolemia I95.89, E86.1    Bilateral pulmonary embolism (HCC) I26.99    Electrolyte imbalance E87.8       Isolation/Infection:   Isolation          No Isolation        Patient Infection Status     Infection Onset Added Last Indicated Last Indicated By Review Planned Expiration Resolved Resolved By    None active    Resolved    COVID-19 Rule Out 04/15/21 04/15/21 04/16/21 COVID-19 (Ordered)   04/17/21 Rule-Out Test Resulted          Nurse Assessment:  Last Vital Signs: /79   Pulse 73   Temp 96.3 °F (35.7 °C) (Oral)   Resp 18   Ht 5' 11\" (1.803 m)   Wt 248 lb 6.3 oz (112.7 kg)   SpO2 96%   BMI 34.64 kg/m²     Last documented pain score (0-10 scale): Pain Level: 9  Last Weight:   Wt Readings from Last 1 Encounters:   04/24/21 248 lb 6.3 oz (112.7 kg)     Mental Status:  alert    IV Access:  - None    Nursing Mobility/ADLs:  Walking   Dependent  Transfer  Dependent  Bathing  Dependent  Dressing  Dependent  Toileting  Dependent  Feeding  Independent  Med Admin  Assisted  Med Delivery   none and whole    Wound Care Documentation and Therapy:  Incision 05/01/18 Back Posterior (Active)   Number of days: 9234       Puncture 04/23/21 Neck Anterior;Right (Active)   Wound Assessment Clean;Dry; Intact 04/23/21 1800   Dressing/Treatment Tegaderm/transparent film dressing;Gauze dressing/dressing sponge 04/24/21 1000   Dressing Changed Changed/New 04/23/21 1510   Dressing Status Clean;Dry; Intact 04/24/21 1000   Number of days: 2       Puncture 04/23/21 Neck Anterior;Right (Active)   Wound Assessment Clean;Dry; Intact 04/23/21 1800   Dressing/Treatment Gauze dressing/dressing sponge;Tegaderm/transparent film dressing 04/24/21 1000   Dressing Changed Changed/New 04/23/21 1510   Dressing Status Clean;Dry; Intact 04/24/21 1000   Number of days: 2        Elimination:  Continence:   · Bowel: Yes  · Bladder: Yes  Urinary Catheter: None   Colostomy/Ileostomy/Ileal Conduit: No       Date of Last BM: 4/26/2021    Intake/Output Summary (Last 24 hours) at 4/26/2021 1450  Last data filed at 4/26/2021 0950  Gross per 24 hour   Intake 430 ml   Output 1700 ml   Net -1270 ml     I/O last 3 completed shifts:   In: 1020 [P.O.:1020]  Out: 2450 Admission H&P: {CHP DME Changes in RGEYJ:[id number]}    PHYSICIAN SIGNATURE:  {Esignature:[id number]}

## 2021-04-26 NOTE — PROGRESS NOTES
Discharge note: Patient has been seen by doctor. Discharge order obtained, and discharge instructions reviewed. Patient educated, using the teach back method, about follow up instructions and discharge instructions. A completed copy of the AVS instructions given to patient and all questions answered. x2 IV catheter removed without complaints, catheter intact, site WNL. [facility]. Report given to transporters, all valuables and meds to bed with patient.

## 2021-04-26 NOTE — DISCHARGE SUMMARY
Hospitalist Discharge Summary    Patient ID:  Byron Chandler  [id number]  82 y.o.  1955    Admit date: 4/23/2021    Discharge date: 4/26/2021    Disposition: home    Admission Diagnoses:   Patient Active Problem List   Diagnosis    Right-sided chest wall pain    Pneumonia    Sarcoidosis    Syncope, near    Rectal bleeding    Diverticulosis    HTN (hypertension)    Bright red rectal bleeding    Hypertensive emergency    Headache    H[facility]acute [facility]) ([facility] [facility]    Hypoxia    Right-sided low back pain without sciatica    Hypotension due to hypovolemia    Bilateral pulmonary embolism (HCC)    Electrolyte imbalance       Discharge Diagnoses: Active Problems:    HARRY (acute kidney injury) ([facility] [address]    Bilateral pulmonary embolism (HCC)    Electrolyte imbalance  Resolved Problems:    * No resolved hospital problems. *      Code Status:  Full Code    Condition:  Stable    Discharge Diet: Diet:  DIET GENERAL; Daily Fluid Restriction: 1200 ml    PCP to do list: Follow-up for improvement of symptoms    Hospital Course: As per HPI:  60 y.o. male with a PMHx of HTN, HLD, sarcoidosis, Chronic back pain/spinal cord injury with poor mobility who p/w sudden onset shortness of breath from his nursing facility. Patient states that he has been having shortness of breath for the past 2 weeks on and off. However last night patient became suddenly severely short of breath and felt like he could not catch his breath. Patient received albuterol treatments at his nursing home prior to arrival with no improvement in his breathing. When EMS arrived they found to be hypoxic in the 80s on room air with improvement on a nonrebreather mask, tachypneic, blood pressure was normal and patient was slightly tachycardic. Patient does endorse being immobile for the past couple of weeks. He also endorses a \"worried\" headache.   Patient states that he only takes his aspirin every now and then as he will be discharged home with home health care      Discharge Medications:   Current Discharge Medication List      START taking these medications    Details   !! apixaban (ELIQUIS) 5 MG TABS tablet Take 2 tablets by mouth 2 times daily for 5 days  Qty: 20 tablet, Refills: 0      !! apixaban (ELIQUIS) 5 MG TABS tablet Take 1 tablet by mouth 2 times daily  Qty: 60 tablet, Refills: 0      metFORMIN (GLUCOPHAGE) 500 MG tablet Take 1 tablet by mouth 2 times daily (with meals)  Qty: 60 tablet, Refills: 5      blood glucose monitor strips Test 2 times a day & as needed for symptoms of irregular blood glucose. Dispense sufficient amount for indicated testing frequency plus additional to accommodate PRN testing needs. Qty: 200 strip, Refills: 0    Comments: Brand per patient preference. May round up to next available package size. blood glucose monitor kit and supplies Dispense sufficient amount for indicated testing frequency plus additional to accommodate PRN testing needs. Dispense all needed supplies to include: monitor, strips, lancing device, lancets, control solutions, alcohol swabs. Qty: 1 kit, Refills: 0    Comments: Brand per patient preference. May round up to next available package size. !! - Potential duplicate medications found. Please discuss with provider. Current Discharge Medication List        Current Discharge Medication List      CONTINUE these medications which have NOT CHANGED    Details   lisinopril (PRINIVIL;ZESTRIL) 40 MG tablet Take 1 tablet by mouth daily Hold this medicine for now until you have follow-up with your regular physician. Qty: 30 tablet, Refills: 3      chlorthalidone (HYGROTON) 25 MG tablet Take 1 tablet by mouth daily Hold this medicine until you have follow-up with your regular physician.   Qty: 30 tablet, Refills: 3      lidocaine 4 % external patch Place 1 patch onto the skin daily as needed (apply to shoulders if pain)      fluocinolone (SYNALAR) 0.025 % cream Apply topically 2 times daily as needed (itching related to psoriasis) Apply topically      hypromellose (ISOPTO TEARS) 0.5 % ophthalmic solution Place 1 drop into both eyes 4 times daily as needed (dry eyes)      senna-docusate (PERICOLACE) 8.6-50 MG per tablet Take 1-2 tablets by mouth 2 times daily as needed for Constipation      tacrolimus (PROTOPIC) 0.1 % ointment Apply topically 2 times daily as needed (apply topically to scalp if itching from psoriasis) Apply topically      vitamin E 400 UNIT capsule Take 400 Units by mouth daily      vitamin D (CHOLECALCIFEROL) 25 MCG (1000 UT) TABS tablet Take 1,000 Units by mouth daily       aspirin (ASPIRIN 81) 81 MG EC tablet Take 81 mg by mouth daily       albuterol sulfate  (90 Base) MCG/ACT inhaler Inhale 2 puffs into the lungs every 6 hours as needed for Shortness of Breath            Current Discharge Medication List      STOP taking these medications       acetaminophen (TYLENOL) 500 MG tablet Comments:   Reason for Stopping:                   Procedures: EKOS    Assessment on Discharge: Stable, improved     Discharge ROS:  A complete review of systems was asked and negative except for none    Discharge Exam:  /75   Pulse 73   Temp 98.5 °F (36.9 °C) (Oral)   Resp 16   Ht 5' 11\" (1.803 m)   Wt 248 lb 6.3 oz (112.7 kg)   SpO2 93%   BMI 34.64 kg/m²     General appearance: No apparent distress, appears stated age and cooperative. HEENT: Pupils equal, round, and reactive to light. Conjunctivae/corneas clear. Neck: Supple, with full range of motion. No jugular venous distention. Trachea midline. Respiratory:  Normal respiratory effort. Clear to auscultation, bilaterally without Rales/Wheezes/Rhonchi. Cardiovascular: Regular rate and rhythm with normal S1/S2 without murmurs, rubs or gallops. Abdomen: Soft, non-tender, non-distended with normal bowel sounds. Musculoskeletal: No clubbing, cyanosis or edema bilaterally.   Full range of motion of each access site were saved. Inferior was dilated to 6 [place] was dilated to 6 [place] 0.035 inch intravenous guidewire was advanced into the left common iliac vein. Digital subtraction venography was performed demonstrating no ileal caval thrombus. Subsequent, C2 cobra catheter was used for catheterization of the pulmonary artery was performed with a Glidewire and subsequently exchanged with a glide advantage. EKOS ultrasound assisted infusion catheter with 12 cm sidehole was introduced into the right lower lobe pulmonary artery, third order. Pulmonary angiogram was performed on the left demonstrating clot with placement of the catheter into the left lower lobe segmental branch. Using the other sheath, with similar technique, cobra catheter and Glidewire manipulation and subsequent exchange wire placement. Pulmonary angiogram was performed on the right demonstrating clot with placement of the catheter into the right lower lobe segmental branch. EKOS catheter placement into the left lower lobe pulmonary artery, third order Satisfactory positioning was confirmed. Both catheters and sheaths were sutured secured to the skin and a sterile dressing applied TPA infusion 1 mg per hour via each catheter Heparin infusion 250 units per hour through each sheath, subtherapeutic Complications: None Estimated blood vessels: Less than 12 mL Fluoroscopy time: 2.8  minutes Number fluoroscopic images: 7 Number sonographic IMAGES: 2     1. Satisfactory bilateral selective pulmonary artery lower lobe catheterization with pulmonary angiogram and EKOS TPA ultrasound catheter assisted delivery of TPA as described. 2. Rate: 6 hour infusion with 1 mg/h through each catheter has been scheduled. 3. Successful inferior vena cava gram.     Xr Chest Portable    Result Date: 4/23/2021  Patient: Rudy Mehta  Time Out: 02:49 Exam(s): FILM CXR 1 VIEW  EXAM:   XR Chest, 1 View  CLINICAL HISTORY:   short of breath.   TECHNIQUE: Frontal view of the chest.  COMPARISON: April 15, 2021. FINDINGS:   Lungs: Pulmonary vascular congestion is present. There may be some early interstitial pulmonary edema. Findings could be related to congestive heart failure. Pleural space:  Unremarkable. No pneumothorax. Heart: Right hemidiaphragm elevation is present. Cardiomegaly is noted. Mediastinum: Mediastinum is prominent, probably related to prominent vessels. Bones/joints: Degenerative changes present. Electronically signed by Stephanie Vasquez D.O. on 04-23-21 at [id number]    Findings are suggestive of congestive heart failure. No infiltrate    Xr Chest Portable    Result Date: 4/15/2021  XR CHEST PORTABLE Indication: hypoxic episode COMPARISON: May 25, 2020 Findings: Portable AP upright view of the chest was obtained. The patient is rotated. Heart is stable in size and configuration. This remains enlarged. Ectasia of the thoracic aorta is again noted. There is elevation of the right hemidiaphragm which is stable. The lungs are clear. No pneumothorax or effusion. Impression: Stable cardiomegaly. Ct Chest Pulmonary Embolism W Contrast    Result Date: 4/23/2021  CR Patient: Traci Gonzalez  Time Out: 04:28 Exam(s): CTA CHEST With Contrast  EXAM:   CT Angiography Chest With Intravenous Contrast  CLINICAL HISTORY:   CP, dyspnea, hypoxia. TECHNIQUE:   Axial computed tomographic angiography images of the chest with intravenous contrast.  CTDI is 16.13 mGy and DLP is 633.78 mGy-cm. All CT scans at this facility use dose modulation, iterative reconstruction, and/or weight based dosing when appropriate to reduce radiation dose to as low as reasonably achievable. MIP reconstructed images were created and reviewed. COMPARISON:   Chest radiograph on 4/23/2021  FINDINGS:   Limitations:  Exam is limited by motion artifact. Pulmonary arteries:  Severe pulmonary emboli burden.   Pulmonary emboli in the distal left pulmonary artery, extending into the segmental and subsegmental pulmonary arteries to the left upper lobe and left lower lobe. Pulmonary emboli in the distal right pulmonary artery, extending into the segmental and subsegmental pulmonary arteries to the right upper lobe, right middle lobe, and right lower lobe. No definite saddle embolus identified. Aorta:  No aortic aneurysm or dissection. Lungs:  Scattered atelectasis. No focal consolidation. Mild paraseptal emphysematous changes at the lung apices. Pleural space:  Unremarkable. No significant effusion. No pneumothorax. Heart:  Right heart strain with RV/LV ratio greater than 1. No significant pericardial effusion. Mediastinum:  Nonspecific mildly prominent mediastinal lymph nodes. Bones/joints:  Old fracture deformities of the left RIBS. Degenerative changes of the spine. No dislocation. Soft tissues:  Unremarkable. Lymph nodes:  Unremarkable. No enlarged lymph nodes. Liver:  Hepatomegaly. Gallbladder and bile ducts:  Prior cholecystectomy. Spleen:  Small splenule. Upper abdomen:  Elevation of the right hemidiaphragm. Communications:  04/23/21 04:29 Call Doctor Regarding Pulmonary Embolism, called  Dr. Slade Quintero on 04/23 04:30 (-04:00)  Electronically signed by Sundar Booth M.D. on 04-23-21 at [id number]    1. Severe pulmonary emboli burden. Pulmonary emboli in the distal left pulmonary artery, extending into the segmental and subsegmental pulmonary arteries to the left upper lobe and left lower lobe. Pulmonary emboli in the distal right pulmonary artery, extending into the segmental and subsegmental pulmonary arteries to the right upper lobe, right middle lobe, and right lower lobe. No definite saddle embolus identified. 2.  No aortic aneurysm or dissection. 3.  Right heart strain with RV/LV ratio greater than 1. 4.  No acute pulmonary parenchymal abnormality identified.     Vl Extremity Venous Bilateral    Result Date: 4/24/2021  Lower Extremities DVT Study Demographics   Patient Name        Yosef Main   Date of Study       04/23/2021    Gender                Male   Patient Number      [id number]    Date of Birth         1955   Visit Number        [id number]     Age                   60 year(s)   Accession Number    [id number]    Room Number           7921   Corporate ID        [id number]       Cassandra Mejia RVT,                                                          [facility]   Ordering Physician  [address], [address] [facility]                                    Physician             Readers                                                          Matti José MD  Procedure Type of Study:   Veins:Lower Extremities DVT Study, VASC EXTREMITY VENOUS DUPLEX BILATERAL. Vascular Sonographer Report  Indications for Study:Suspected pulmonary embolism. Additional Indications:Patient presents due to bilateral massive PE's. He has been short of breath for the past two weeks. He denies any lower extremity symptoms. Venous Duplex Scan: B-mode imaging of the deep and superficial veins, with compression maneuvers, including color and Doppler spectral waveform analysis. Impressions Right Impression There is acute partially to totally occluding deep venous thrombosis involving the popliteal vein and peroneal veins. Limited visualization of the posterior tibial veins due to body habitus. Within the limits of this exam, there is no other evidence of acute deep or superficial venous thrombosis. Left Impression There is acute partially to totally occluding deep venous thrombosis involving the popliteal vein (distal knee area), posterior tibial veins, peroneal veins (limited visualization but appears only partially compressible), and one soleal vein. Within the limits of this exam, there is no other evidence of acute deep or superficial venous thrombosis. There are no previous studies for comparison.  Conclusions   Summary   There is evidence of acute DVT involving the right popliteal vein and  peroneal veins. There is evidence of acute DVT involving the left popliteal, posterior  tibial, peroneal, and soleal veins. Signature   ------------------------------------------------------------------  Electronically signed by Sade Trevino MD (Interpreting  physician) on 04/24/2021 at 04:30 PM  ------------------------------------------------------------------  Patient Status:Routine. Study [address] [place] [address] - Vascular Lab. Technical Quality:Limited visualization due to body habitus. - Results were reported to: [facility] at 10:57 am on 4/23/21. Risk Factors History +---------+----+-------------------------------------------------------------+ ! Diagnosis! Date! Comments                                                     ! +---------+----+-------------------------------------------------------------+ ! Other    ! !H/O PE, diverticulosis, sarcoidosis, chronic back pain/spinal! !         !    !cord injury with limited mobility                            ! +---------+----+-------------------------------------------------------------+   - The patient's risk factor(s) include: dyslipidemia and arterial     hypertension.   - The patient has a former tobacco history(hasn't smoked in the past 25     years). Velocities are measured in cm/s ; Diameters are measured in mm Right Lower Extremities DVT Study Measurements Right 2D Measurements +------------------------+----------+---------------+----------+ ! Location                ! Visualized! Compressibility! Thrombosis! +------------------------+----------+---------------+----------+ ! Sapheno Femoral Junction! Yes       ! Yes            ! None      ! +------------------------+----------+---------------+----------+ ! GSV Thigh               ! Yes       ! Yes            ! None      ! +------------------------+----------+---------------+----------+ ! Common Femoral          !Yes       ! Yes            ! None ! +------------------------+----------+---------------+----------+ ! Femoral                 !Yes       ! Yes            ! None      ! +------------------------+----------+---------------+----------+ ! Prox Femoral            !Yes       ! Yes            ! None      ! +------------------------+----------+---------------+----------+ ! Mid Femoral             !Yes       ! Yes            ! None      ! +------------------------+----------+---------------+----------+ ! Dist Femoral            !Yes       ! Yes            ! None      ! +------------------------+----------+---------------+----------+ ! Deep Femoral            !Yes       ! Yes            ! None      ! +------------------------+----------+---------------+----------+ ! Popliteal               !Yes       ! Partial        !Acute     ! +------------------------+----------+---------------+----------+ ! GSV Below Knee          ! Yes       ! Yes            ! None      ! +------------------------+----------+---------------+----------+ ! Gastroc                 ! Yes       ! Yes            ! None      ! +------------------------+----------+---------------+----------+ ! Soleal                  !Yes       ! Yes            ! None      ! +------------------------+----------+---------------+----------+ ! PTV                     ! Partial   !Yes            ! None      ! +------------------------+----------+---------------+----------+ ! Peroneal                !Yes       ! No             !Acute     ! +------------------------+----------+---------------+----------+ ! GSV Calf                ! Yes       ! Yes            ! None      ! +------------------------+----------+---------------+----------+ ! SSV                     ! Yes       ! Yes            ! None      ! +------------------------+----------+---------------+----------+ Right Doppler Measurements +------------------------+------+------+------------+ ! Location                ! Signal!Reflux! Reflux (sec)!  +------------------------+------+------+------------+ !Sapheno Femoral Junction! Phasic! No    !            ! +------------------------+------+------+------------+ ! Common Femoral          !Phasic! No    !            ! +------------------------+------+------+------------+ ! Femoral                 !Phasic! No    !            ! +------------------------+------+------+------------+ ! Prox Femoral            !Phasic! No    !            ! +------------------------+------+------+------------+ ! Deep Femoral            !Phasic! No    !            ! +------------------------+------+------+------------+ ! Popliteal               !Absent! No    !            ! +------------------------+------+------+------------+ Left Lower Extremities DVT Study Measurements Left 2D Measurements +------------------------+----------+---------------+----------+ ! Location                ! Visualized! Compressibility! Thrombosis! +------------------------+----------+---------------+----------+ ! Sapheno Femoral Junction! Yes       ! Yes            ! None      ! +------------------------+----------+---------------+----------+ ! GSV Thigh               ! Yes       ! Yes            ! None      ! +------------------------+----------+---------------+----------+ ! Common Femoral          !Yes       ! Yes            ! None      ! +------------------------+----------+---------------+----------+ ! Femoral                 !Yes       ! Yes            ! None      ! +------------------------+----------+---------------+----------+ ! Prox Femoral            !Yes       ! Yes            ! None      ! +------------------------+----------+---------------+----------+ ! Mid Femoral             !Yes       ! Yes            ! None      ! +------------------------+----------+---------------+----------+ ! Dist Femoral            !Yes       ! Yes            ! None      ! +------------------------+----------+---------------+----------+ ! Deep Femoral            !Yes       ! Yes            ! None      ! +------------------------+----------+---------------+----------+ !Popliteal               !Yes       ! Partial        !Acute     ! +------------------------+----------+---------------+----------+ ! GSV Below Knee          ! Yes       ! Yes            ! None      ! +------------------------+----------+---------------+----------+ ! Gastroc                 ! Yes       ! Yes            ! None      ! +------------------------+----------+---------------+----------+ ! Soleal                  !Yes       ! No             !Acute     ! +------------------------+----------+---------------+----------+ ! PTV                     ! Yes       ! No             !Acute     ! +------------------------+----------+---------------+----------+ ! Peroneal                !Partial   !Partial        !Acute     ! +------------------------+----------+---------------+----------+ ! GSV Calf                ! Yes       ! Yes            ! None      ! +------------------------+----------+---------------+----------+ ! SSV                     ! No        !               !          ! +------------------------+----------+---------------+----------+ Left Doppler Measurements +------------------------+------+------+------------+ ! Location                ! Signal!Reflux! Reflux (sec)! +------------------------+------+------+------------+ ! Sapheno Femoral Junction! Phasic! No    !            ! +------------------------+------+------+------------+ ! Common Femoral          !Phasic! No    !            ! +------------------------+------+------+------------+ ! Femoral                 !Phasic! No    !            ! +------------------------+------+------+------------+ ! Prox Femoral            !Phasic! No    !            ! +------------------------+------+------+------------+ ! Deep Femoral            !Phasic! No    !            ! +------------------------+------+------+------------+ ! Popliteal               !Absent! No    !            ! +------------------------+------+------+------------+          Last Labs on Discharge:     Recent Results (from the past 24 hour(s))   POCT Glucose    Collection Time: 04/25/21  5:23 PM   Result Value Ref Range    POC Glucose 232 (H) 70 - 99 mg/dl    Performed on ACCU-CHEK    POCT Glucose    Collection Time: 04/25/21 10:04 PM   Result Value Ref Range    POC Glucose 207 (H) 70 - 99 mg/dl    Performed on ACCU-CHEK    POCT Glucose    Collection Time: 04/26/21  4:25 AM   Result Value Ref Range    POC Glucose 178 (H) 70 - 99 mg/dl    Performed on ACCU-CHEK    POCT Glucose    Collection Time: 04/26/21  8:19 AM   Result Value Ref Range    POC Glucose 151 (H) 70 - 99 mg/dl    Performed on ACCU-CHEK    Basic Metabolic Panel w/ Reflex to MG    Collection Time: 04/26/21  8:43 AM   Result Value Ref Range    Sodium 134 (L) 136 - 145 mmol/L    Potassium reflex Magnesium 3.8 3.5 - 5.1 mmol/L    Chloride 98 (L) 99 - 110 mmol/L    CO2 26 21 - 32 mmol/L    Anion Gap 10 3 - 16    Glucose 169 (H) 70 - 99 mg/dL    BUN 22 (H) 7 - 20 mg/dL    CREATININE 1.1 0.8 - 1.3 mg/dL    GFR Non-African American >60 >60    GFR African American >60 >60    Calcium 9.7 8.3 - 10.6 mg/dL         Follow up: with [facility]    Note that over 30 minutes was spent in preparing discharge papers, discussing discharge with patient, medication review, etc.    Thank you Herminio Guerrero [phone] for the opportunity to be involved in this patient's care. If you have any questions or concerns please feel free to contact me at [phone].     Electronically signed by Constanza Lane MD on 4/26/2021 at 11:47 AM

## 2021-04-26 NOTE — CARE COORDINATION
Case Management Assessment            Discharge Note                    Date / Time of Note: 4/26/2021 10:47 AM                  Discharge Note Completed by: Alfonso Aparicio    Patient Name: Zachary Sanford   YOB: 1955  Diagnosis: Bilateral pulmonary embolism [facility]) [I26.99]   Date / Time: 4/23/2021  1:45 AM    Current PCP: Ever Aldridge patient: No    Hospitalization in the last 30 days: Yes    Advance Directives:  Code Status: Full Code  [place] DNR form completed and on chart: Not Indicated    Financial:  Payor: Anish Julien / Plan: Brett Alvarez / Product Type: *No Product type* /      Pharmacy:    [place] [address] Pedro Hilton [phone] - F [phone]  [address] [place] [address]  Phone: [phone] Fax: [address] [place] [address]  Phone: [phone] Fax: [phone]      Assistance purchasing medications?:    Assistance provided by Case Management: None at this time    Does patient want to participate in local refill/ meds to beds program?:      Meds To Beds General Rules:  1. Can ONLY be done Monday- Friday between 8:30am-5pm  2. Prescription(s) must be in pharmacy by 3pm to be filled same day  3. Copy of patient's insurance/ prescription drug card and patient face sheet must be sent along with the prescription(s)  4. Cost of Rx cannot be added to hospital bill. If financial assistance is needed, please contact unit  or ;  or  CANNOT provide pharmacy voucher for patients co-pays  5.  Patients can then  the prescription on their way out of the hospital at discharge, or pharmacy can deliver to the bedside if staff is available. (payment due at time of pick-up or delivery - cash, check, or card accepted)     Able to afford home medications/ co-pay costs: Yes    ADLS:  Current PT AM-PAC Score: 11 /24  Current OT AM-PAC Score: 16 /24      DISCHARGE Disposition: Home with Home Health Care: [address]     LOC at discharge: Not Applicable  RAQUEL Completed: Not Indicated    Notification completed in HENS/PAS?:  Not Applicable    IMM Completed:   Not Indicated    Transportation:  Transportation PLAN for discharge: EMS transportation   Mode of Transport: Ambulance stretcher - BLS  Reason for medical transport: Severe muscular weakness and de-conditioned state due to CHF and requires ambulance transport due to unsafe transfer  Name of Transport Company: Not Applicable  Time of Transport: 3439-2220    Transport form completed: Not Indicated    Home Care:  [address] ordered at discharge: Robinson Avalos: Not Applicable set up from [facility] PCP office  Orders faxed: Yes    Durable Medical Equipment:  DME Provider: n/a  Equipment obtained during hospitalization: wheelchair, cane, walker and left chair    Home Oxygen and Respiratory Equipment:  Oxygen needed at discharge?: Not [address]: Not Applicable  Portable tank available for discharge?: Not Indicated    Dialysis:  Dialysis patient: No    Dialysis Center:  Not Applicable    Hospice Services:  Location: Not Applicable  Agency: Not Applicable    Consents signed: Not Indicated    Referrals made at [facility] for outpatient continued care:  Not Applicable    Additional CM Notes:   Patient is from home with mother and sister. He was at [facility] for a couple of weeks after last hospitalization. He does not want to go back to SNF, he insists on going home. Patient states he has COA for aide services. Patient has lift chair, wheelchair, scooter, walker, cane and bedside commode. He is discharging home with skilled home health care that is being set up by [facility] PCP office. CM faxed orders and updates to [phone], spoke with Celsa at [phone], extension [phone].   Patient is requesting medical transport home, which is being arrange by [address] [facility] The Plan for Transition of Care is related to the following treatment goals of Bilateral pulmonary embolism ([address] [I26.99]    The Patient and/or patient representative Law Sigala and his family were provided with a choice of provider and agrees with the discharge plan Yes    Freedom of choice list was provided with basic dialogue that supports the patient's individualized plan of care/goals and shares the quality data associated with the providers.  Yes    Care Transitions patient: No    Bronwyn Tello RN  [facility]  Case Management Department  Ph: [phone]  Fax: [phone]

## 2021-04-26 NOTE — PROGRESS NOTES
Nephrology  Note                                                                                                                                                                                                                                                                                                                                                               Office : [phone]     Fax :[phone]              Patient's Name: Humberto Espitia    Reason for Consult:  Pt with HARRY given Contrast  Requesting Physician:  [facility]      Chief Complaint: Shortness of breath         Good UO   Cr stable   No N/V/D  SOB stable   Na low       Past Medical History:   Diagnosis Date    DJD (degenerative joint disease)     HYPERCHOLESTERAEMIA     Hypertension     Sarcoidosis     Spinal cord injuries      FALL       Past Surgical History:   Procedure Laterality Date    BACK SURGERY      COLONOSCOPY  12/24/2018    COLONOSCOPY POLYPECTOMY SNARE/COLD [facility]OPSY [facility]    OTHER SURGICAL HISTORY  [facility]/01/2018    [facility]    UPPER GASTROINTESTINAL ENDOSCOPY  12/24/2018    COLONOSCOPY SUBMUCOSAL/BOTOX INJECTION performed by [facility]ito [facility]os MD at [facility]OSCOPY       No family history on file. reports that he quit smoking about 6 years ago. He has never used smokeless tobacco. He reports that he does not drink alcohol or use drugs.     Allergies:  Baclofen and Robaxin [methocarbamol]    Current Medications:    glucose (GLUTOSE) 40 % oral gel 15 g, PRN  dextrose 50 % IV solution, PRN  glucagon (rDNA) injection 1 mg, PRN  dextrose 5 % solution, PRN  insulin lispro (1 Unit Dial) 0-6 Units, TID WC  insulin lispro (1 Unit Dial) 0-3 Units, Nightly  traMADol (ULTRAM) tablet 50 mg, Q6H PRN  oxyCODONE-acetaminophen (PERCOCET) 5-325 MG per tablet 1 tablet, Q6H PRN  apixaban (ELIQUIS) tablet 10 mg, BID Followed by  Venus Tirado ON 5/1/2021] apixaban (ELIQUIS) tablet 5 mg, BID  sodium chloride flush 0.9 % injection 5-40 mL, 2 times per day  sodium chloride flush 0.9 % injection 5-40 mL, PRN  promethazine (PHENERGAN) tablet 12.5 mg, Q6H PRN    Or  ondansetron (ZOFRAN) injection 4 mg, Q6H PRN  polyethylene glycol (GLYCOLAX) packet 17 g, Daily PRN  sodium chloride flush 0.9 % injection 5-40 mL, 2 times per day  sodium chloride flush 0.9 % injection 5-40 mL, PRN  0.9 % sodium chloride infusion, PRN  lidocaine 4 % external patch 1 patch, Daily        Review of Systems:   14 point ROS obtained but were negative except mentioned in HPI      Physical exam:     Vitals:  /65   Pulse 80   Temp 98.5 °F (36.9 °C) (Oral)   Resp 18   Ht 5' 11\" (1.803 m)   Wt 248 lb 6.3 oz (112.7 kg)   SpO2 95%   BMI 34.64 kg/m²   Constitutional:  OAA X3 NAD  Skin: no rash, turgor wnl  Heent:  eomi, mmm  Neck: no bruits or jvd noted  Cardiovascular:  S1, S2 without m/r/g  Respiratory: Diminished breath sounds bilaterally; some crackles   Abdomen:  +bs, soft, nt, nd  Ext: 1+ lower extremity edema  Psychiatric: mood and affect appropriate  Musculoskeletal:  Rom, muscular strength intact    Data:   Labs:  CBC:   Recent Labs     04/23/21  0744 04/23/21  1327 04/24/21  0415   WBC 14.4* 13.5* 13.4*   HGB 13.5 12.9* 12.2*    219 206     BMP:    Recent Labs     04/23/21  0744 04/24/21  0415 04/25/21  1049   * 134* 129*   K 4.1 4.1 3.8   CL 93* 97* 92*   CO2 24 23 26   BUN 45* 52* 35*   CREATININE 2.0* 1.8* 1.4*   GLUCOSE 199* 215* 210*     Ca/Mg/Phos:   Recent Labs     04/23/21  0744 04/24/21  0415 04/25/21  1049   CALCIUM 9.2 8.7 9.9   PHOS  --   --  2.1*     Hepatic:   Recent Labs     04/24/21  0415   AST 22   ALT 24   BILITOT 0.7   ALKPHOS 67     Troponin:   Recent Labs     04/23/21  0204 04/23/21  0744 04/23/21  1102   TROPONINI 0.24* 0.40* 0.29*     BNP: No results for input(s): BNP in the last 72 hours.   Lipids: No results for input(s): CHOL, TRIG, HDL, LDLCALC, LABVLDL in the last 72 hours. ABGs: No results for input(s): PHART, PO2ART, GKX4BBG in the last 72 hours. INR:   Recent Labs     04/23/21  0504   INR 1.94*     UA:No results for input(s): Moreno Rather, GLUCOSEU, BILIRUBINUR, Clydie Layer, BLOODU, PHUR, PROTEINU, UROBILINOGEN, NITRU, LEUKOCYTESUR, Roscoe Severin in the last 72 hours. Urine Microscopic: No results for input(s): LABCAST, BACTERIA, COMU, HYALCAST, WBCUA, RBCUA, EPIU in the last 72 hours. Urine Culture: No results for input(s): LABURIN in the last 72 hours. Urine Chemistry:   Recent Labs     04/23/21  1327   LABCREA 200.0   NAUR <20             IMAGING:  VL Extremity Venous Bilateral   Final Result      IR ANGIOGRAM PULMONARY BILATERAL   Final Result      1. Satisfactory bilateral selective pulmonary artery lower lobe catheterization with pulmonary angiogram and EKOS TPA ultrasound catheter assisted delivery of TPA as described. 2. Rate: 6 hour infusion with 1 mg/h through each catheter has been scheduled. 3. Successful inferior vena cava gram.          CT CHEST PULMONARY EMBOLISM W CONTRAST   Final Result   1. Severe pulmonary emboli burden. Pulmonary emboli in the distal left    pulmonary artery, extending into the segmental and subsegmental pulmonary    arteries to the left upper lobe and left lower lobe. Pulmonary emboli in    the distal right pulmonary artery, extending into the segmental and    subsegmental pulmonary arteries to the right upper lobe, right middle    lobe, and right lower lobe. No definite saddle embolus identified. 2.  No aortic aneurysm or dissection. 3.  Right heart strain with RV/LV ratio greater than 1.   4.  No acute pulmonary parenchymal abnormality identified. XR CHEST PORTABLE   Final Result   Findings are suggestive of congestive heart failure. No infiltrate      VASCULAR REPORT    (Results Pending)       Assessment/Plan   1.  HARRY   Likely 2/2 poor

## 2021-04-27 NOTE — PROGRESS NOTES
Nephrology  Note                                                                                                                                                                                                                                                                                                                                                               Office : [phone]     Fax :[phone]              Patient's Name: Lovely Pina    Reason for Consult:  Pt with HARRY given Contrast  Requesting Physician:  [facility]      Chief Complaint: Shortness of breath         Good UO   Cr stable   No N/V/D  SOB stable   Na low       Past Medical History:   Diagnosis Date    DJD (degenerative joint disease)     HYPERCHOLESTERAEMIA     Hypertension     Sarcoidosis     Spinal cord injuries      FALL       Past Surgical History:   Procedure Laterality Date    BACK SURGERY      COLONOSCOPY  12/24/2018    COLONOSCOPY POLYPECTOMY SNARE/COLD BIOPSY performed [facility]    OTHER SURGICAL HISTORY  05/01/2018    Kphoplasty    UPPER GASTROINTESTINAL ENDOSCOPY  12/24/2018    COLONOSCOPY SUBMUCOSAL/BOTOX INJECTION performed by Lamar Edwards MD at [facility] ENDOSCOPY       No family history on file. reports that he quit smoking about 6 years ago. He has never used smokeless tobacco. He reports that he does not drink alcohol or use drugs. Allergies:  Baclofen and Robaxin [methocarbamol]    Current Medications:    No current facility-administered medications for this encounter.        Review of Systems:   14 point ROS obtained but were negative except mentioned in HPI      Physical exam:     Vitals:  /79   Pulse 73   Temp 96.3 °F (35.7 °C) (Oral)   Resp 18   Ht 5' 11\" (1.803 m)   Wt 248 lb 6.3 oz (112.7 kg)   SpO2 96%   BMI 34.64 kg/m²   Constitutional:  OAA X3 NAD  Skin: no rash, turgor wnl  Heent:  eomi, mmm  Neck: no bruits or jvd noted  Cardiovascular:  S1, S2 without m/r/g  Respiratory: Diminished breath sounds bilaterally; some crackles   Abdomen:  +bs, soft, nt, nd  Ext: 1+ lower extremity edema  Psychiatric: mood and affect appropriate  Musculoskeletal:  Rom, muscular strength intact    Data:   Labs:  CBC:   Recent Labs     04/24/21  0415   WBC 13.4*   HGB 12.2*        BMP:    Recent Labs     04/24/21  0415 04/25/21  1049 04/26/21  0843   * 129* 134*   K 4.1 3.8 3.8   CL 97* 92* 98*   CO2 23 26 26   BUN 52* 35* 22*   CREATININE 1.8* 1.4* 1.1   GLUCOSE 215* 210* 169*     Ca/Mg/Phos:   Recent Labs     04/24/21 0415 04/25/21  1049 04/26/21  0843   CALCIUM 8.7 9.9 9.7   PHOS  --  2.1*  --      Hepatic:   Recent Labs     04/24/21  0415   AST 22   ALT 24   BILITOT 0.7   ALKPHOS 67     Troponin:   No results for input(s): TROPONINI in the last 72 hours. BNP: No results for input(s): BNP in the last 72 hours. Lipids: No results for input(s): CHOL, TRIG, HDL, LDLCALC, LABVLDL in the last 72 hours. ABGs: No results for input(s): PHART, PO2ART, APB6FPW in the last 72 hours. INR:   No results for input(s): INR in the last 72 hours. UA:No results for input(s): Hilton Brisk, GLUCOSEU, BILIRUBINUR, [place], BLOODU, PHUR, PROTEINU, UROBILINOGEN, NITRU, LEUKOCYTESUR, LABMICR, URINETYPE in the last 72 hours. Urine Microscopic: No results for input(s): LABCAST, BACTERIA, COMU, HYALCAST, WBCUA, RBCUA, EPIU in the last 72 hours. Urine Culture: No results for input(s): LABURIN in the last 72 hours. Urine Chemistry:   No results for input(s): [place]lin, PROTEINUR, NAUR in the last 72 hours. IMAGING:  VL Extremity Venous Bilateral   Final Result      IR ANGIOGRAM PULMONARY BILATERAL   Final Result      1.  Satisfactory bilateral selective pulmonary artery lower lobe catheterization with pulmonary angiogram and EKOS TPA ultrasound catheter assisted delivery of TPA as described. 2. Rate: 6 hour infusion with 1 mg/h through each catheter has been scheduled. 3. Successful inferior vena cava gram.          CT CHEST PULMONARY EMBOLISM W CONTRAST   Final Result   1. Severe pulmonary emboli burden. Pulmonary emboli in the distal left    pulmonary artery, extending into the segmental and subsegmental pulmonary    arteries to the left upper lobe and left lower lobe. Pulmonary emboli in    the distal right pulmonary artery, extending into the segmental and    subsegmental pulmonary arteries to the right upper lobe, right middle    lobe, and right lower lobe. No definite saddle embolus identified. 2.  No aortic aneurysm or dissection. 3.  Right heart strain with RV/LV ratio greater than 1.   4.  No acute pulmonary parenchymal abnormality identified. XR CHEST PORTABLE   Final Result   Findings are suggestive of congestive heart failure. No infiltrate      VASCULAR REPORT    (Results Pending)       Assessment/Plan   1. HARRY   Likely 2/2 poor perfusion, however pt was noted to have an elevated Cr in ED and was given contrast due to risk of life threatening pulmonary embolism. - d/c IVF   - Urine studies - reviewed   - RFP   - Expect to improve as perfusion improves     2. HTN  BP stable   - Hold home BP meds   - Keep SBP >90       3.  Acid- base/ Electrolyte imbalance   Hyponatremia  - worse   - free water restriction           Dr. Jayy Hernández MD  Feel free to contact me   [facility] [address]  Office : [phone]  Fax :[phone]

## 2021-05-02 NOTE — DISCHARGE SUMMARY
Pain for up to 3 days. , Disp-9 tablet, R-0Print      !! oxyCODONE HCl (OXY-IR) 10 MG immediate release tablet Take 0.5 tablets by mouth every 6 hours as needed for Pain for up to 3 days. , Disp-12 tablet, R-0Print      lisinopril (PRINIVIL;ZESTRIL) 40 MG tablet Take 1 tablet by mouth daily Hold this medicine for now until you have follow-up with your regular physician., Disp-30 tablet, R-3Adjust Sig      chlorthalidone (HYGROTON) 25 MG tablet Take 1 tablet by mouth daily Hold this medicine until you have follow-up with your regular physician., Disp-30 tablet, R-3Adjust Sig       !! - Potential duplicate medications found. Please discuss with provider.         Discharge Medication List as of 4/19/2021 11:27 AM      CONTINUE these medications which have NOT CHANGED    Details   lidocaine 4 % external patch Place 1 patch onto the skin daily as needed (apply to shoulders if pain), Transdermal, DAILY PRN, Historical Med      fluocinolone (SYNALAR) 0.025 % cream Apply topically 2 times daily as needed (itching related to psoriasis) Apply topically, Topical, 2 TIMES DAILY PRN, Historical Med      hypromellose (ISOPTO TEARS) 0.5 % ophthalmic solution Place 1 drop into both eyes 4 times daily as needed (dry eyes)Historical Med      senna-docusate (PERICOLACE) 8.6-50 MG per tablet Take 1-2 tablets by mouth 2 times daily as needed for ConstipationHistorical Med      tacrolimus (PROTOPIC) 0.1 % ointment Apply topically 2 times daily as needed (apply topically to scalp if itching from psoriasis) Apply topically, Topical, 2 TIMES DAILY PRN, Historical Med      vitamin E 400 UNIT capsule Take 400 Units by mouth dailyHistorical Med      vitamin D (CHOLECALCIFEROL) 25 MCG (1000 UT) TABS tablet Take 1,000 Units by mouth daily Historical Med      aspirin (ASPIRIN 81) 81 MG EC tablet Take 81 mg by mouth daily Historical Med      albuterol sulfate  (90 Base) MCG/ACT inhaler Inhale 2 puffs into the lungs every 6 hours as needed for Shortness of Breath Historical Med      acetaminophen (TYLENOL) 500 MG tablet Take 1,000 mg by mouth every 6 hours as needed (mild pain) Historical Med           Discharge Medication List as of 4/19/2021 11:27 AM      STOP taking these medications       diazePAM (VALIUM) 10 MG tablet Comments:   Reason for Stopping:               Discharge ROS:  A complete review of systems was asked and negative except for chronic back pain. Discharge Exam:    /74   Pulse 77   Temp 98.9 °F (37.2 °C) (Oral)   Resp 19   Ht 5' 11\" (1.803 m)   Wt 258 lb 9.6 oz (117.3 kg)   SpO2 93%   BMI 36.07 kg/m²      General appearance: No apparent distress, appears stated age and cooperative. HEENT: Pupils equal, round, and reactive to light. Conjunctivae/corneas clear. Neck: Supple, with full range of motion. No jugular venous distention. Trachea midline. Respiratory:  Normal respiratory effort. Clear to auscultation, bilaterally without Rales/Wheezes/Rhonchi. Cardiovascular: Regular rate and rhythm with normal S1/S2 without murmurs, rubs or gallops. Abdomen: Soft, non-tender, non-distended with normal bowel sounds. Musculoskelatal: No clubbing, cyanosis or edema bilaterally. Skin: Skin color, texture, turgor normal.  No rashes or lesions. Neurologic:  Cranial nerves: II-XII intact, LE b/l raises against gravity and also somewhat against resistance as well although does not lift legs very high off the bed. Psychiatric: Alert and oriented, thought content appropriate, normal insight      Labs:  For convenience and continuity at follow-up the following most recent labs are provided:    Recent Labs     04/16/21  0623 04/17/21  0400 04/18/21  0505   WBC 6.6 4.9 5.1   HGB 13.7 13.1* 13.4*   HCT 42.4 40.3* 41.5    187 182            Recent Labs     04/16/21  0623 04/17/21  0400 04/18/21  0505    136 136   K 4.8 4.5 4.4    99 100   CO2 25 27 26   BUN 45* 27* 21*   CREATININE 1.6* 1.1 1.2   CALCIUM 9.6 9.8

## 2021-07-28 ENCOUNTER — OFFICE VISIT (OUTPATIENT)
Dept: CARDIOTHORACIC SURGERY | Age: 66
End: 2021-07-28
Payer: OTHER GOVERNMENT

## 2021-07-28 VITALS
OXYGEN SATURATION: 95 % | HEART RATE: 100 BPM | BODY MASS INDEX: 35 KG/M2 | SYSTOLIC BLOOD PRESSURE: 122 MMHG | WEIGHT: 250 LBS | TEMPERATURE: 97.6 F | DIASTOLIC BLOOD PRESSURE: 78 MMHG | HEIGHT: 71 IN

## 2021-07-28 DIAGNOSIS — Q25.49 SINUS OF VALSALVA ANEURYSM: Primary | ICD-10-CM

## 2021-07-28 PROCEDURE — 99204 OFFICE O/P NEW MOD 45 MIN: CPT | Performed by: THORACIC SURGERY (CARDIOTHORACIC VASCULAR SURGERY)

## 2021-07-28 RX ORDER — ASCORBIC ACID 500 MG
500 TABLET ORAL DAILY
COMMUNITY

## 2021-07-28 RX ORDER — POLYETHYLENE GLYCOL 3350 17 G/17G
17 POWDER, FOR SOLUTION ORAL DAILY
COMMUNITY

## 2021-07-28 RX ORDER — ACETAMINOPHEN 325 MG/1
650 TABLET ORAL EVERY 6 HOURS PRN
COMMUNITY

## 2021-07-28 RX ORDER — EMPAGLIFLOZIN 10 MG/1
10 TABLET, FILM COATED ORAL DAILY
COMMUNITY

## 2021-07-28 ASSESSMENT — ENCOUNTER SYMPTOMS
TROUBLE SWALLOWING: 0
SHORTNESS OF BREATH: 1
CHEST TIGHTNESS: 1
BACK PAIN: 1
ALLERGIC/IMMUNOLOGIC NEGATIVE: 1
GASTROINTESTINAL NEGATIVE: 1
EYES NEGATIVE: 1

## 2021-07-28 NOTE — PROGRESS NOTES
Subjective:      Patient ID: James Quevedo is a 60 y.o. male. Chief Complaint   Patient presents with   [facility] Patient     Mr. Melita Barthel is being seen today at the request of the [address] [facility] for possible sinus of valsalva aneurysm. HPI Melita Barthel presents today in referral for discussion about management of a sinus of Valsalva aneurysm. He had a recent chest CT scan which showed an abnormality of his aortic root. He is entirely asymptomatic from this. Of specific importance in his past is that he had a substantial pulmonary embolus towards the end of April of this year. He is currently taking Eliquis for this. He has no other history of cardiac problems. Review of Systems   Constitutional: Positive for activity change and fatigue. HENT: Positive for dental problem ( upper and lower dentures). Negative for nosebleeds and trouble swallowing. Eyes: Negative. Respiratory: Positive for chest tightness (Left side in the morning) and shortness of breath (In the morning). Cardiovascular: Positive for palpitations. Negative for chest pain and leg swelling. Gastrointestinal: Negative. Endocrine: Negative. Genitourinary: Negative. Musculoskeletal: Positive for arthralgias, back pain, gait problem (Uses a scooter and walker) and neck pain. Skin: Positive for rash (psoriasis). Allergic/Immunologic: Negative. Neurological: Positive for weakness. Negative for dizziness and light-headedness. Hematological: Negative. Psychiatric/Behavioral: Positive for sleep disturbance. The patient is not nervous/anxious.       Past Medical History:   Diagnosis Date    DJD (degenerative joint disease)     HYPERCHOLESTERAEMIA     Hypertension     Psoriasis     Pulmonary embolism (HCC)     Sarcoidosis     Sinus of Valsalva aneurysm     Spinal cord injuries      FALL     Past Surgical History:   Procedure Laterality Date    BACK SURGERY      COLONOSCOPY  12/24/2018    COLONOSCOPY POLYPECTOMY SNARE/COLD BIOPSY performed by Tata Taylor MD at [facility]      inguinal    NECK SURGERY      OTHER SURGICAL HISTORY  05/01/2018    [facility]    UPPER GASTROINTESTINAL ENDOSCOPY  12/24/2018    COLONOSCOPY SUBMUCOSAL/BOTOX INJECTION performed by Tata Taylor MD at [address]   Allergen Reactions    Baclofen      Messes with my urine    Robaxin [Methocarbamol]      Irritates my stomach     Current Outpatient Medications   Medication Sig Dispense Refill    apixaban (ELIQUIS) 5 MG TABS tablet Take 5 mg by mouth 2 times daily      empagliflozin (JARDIANCE) 10 MG tablet Take 10 mg by mouth daily      ascorbic acid (VITAMIN C) 500 MG tablet Take 500 mg by mouth daily      acetaminophen (TYLENOL) 325 MG tablet Take 650 mg by mouth every 6 hours as needed for Pain      diclofenac sodium (VOLTAREN) 1 % GEL Apply topically 2 times daily      polyethylene glycol (GLYCOLAX) 17 GM/SCOOP powder Take 17 g by mouth daily      blood glucose monitor strips Test 2 times a day & as needed for symptoms of irregular blood glucose. Dispense sufficient amount for indicated testing frequency plus additional to accommodate PRN testing needs. 200 strip 0    blood glucose monitor kit and supplies Dispense sufficient amount for indicated testing frequency plus additional to accommodate PRN testing needs. Dispense all needed supplies to include: monitor, strips, lancing device, lancets, control solutions, alcohol swabs. 1 kit 0    lisinopril (PRINIVIL;ZESTRIL) 40 MG tablet Take 1 tablet by mouth daily Hold this medicine for now until you have follow-up with your regular physician. 30 tablet 3    chlorthalidone (HYGROTON) 25 MG tablet Take 1 tablet by mouth daily Hold this medicine until you have follow-up with your regular physician.  30 tablet 3    lidocaine 4 % external patch Place 1 patch onto the skin daily as needed (apply to shoulders if pain)      fluocinolone (SYNALAR) 0.025 % cream Apply topically 2 times daily as needed (itching related to psoriasis) Apply topically      hypromellose (ISOPTO TEARS) 0.5 % ophthalmic solution Place 1 drop into both eyes 4 times daily as needed (dry eyes)      senna-docusate (PERICOLACE) 8.6-50 MG per tablet Take 1-2 tablets by mouth 2 times daily as needed for Constipation      tacrolimus (PROTOPIC) 0.1 % ointment Apply topically 2 times daily as needed (apply topically to scalp if itching from psoriasis) Apply topically      vitamin E 400 UNIT capsule Take 400 Units by mouth daily      vitamin D (CHOLECALCIFEROL) 25 MCG (1000 UT) TABS tablet Take 1,000 Units by mouth daily       albuterol sulfate  (90 Base) MCG/ACT inhaler Inhale 2 puffs into the lungs every 6 hours as needed for Shortness of Breath        No current facility-administered medications for this visit. Social History     Socioeconomic History    Marital status: Single     Spouse name: Not on file    Number of children: Not on file    Years of education: Not on file    Highest education level: Not on file   Occupational History    Not on file   Tobacco Use    Smoking status: Former Smoker     Types: Cigarettes     Quit date:      Years since quittin.5    Smokeless tobacco: Never Used    Tobacco comment: 3 cig   Vaping Use    Vaping Use: Never used   Substance and Sexual Activity    Alcohol use: No     Comment: Rarely    Drug use: No    Sexual activity: Not on file   Other Topics Concern    Not on file   Social History Narrative    Not on file     Social Determinants of Health     Financial Resource Strain:     Difficulty of Paying Living Expenses:    Food Insecurity:     Worried About Running Out of Food in the Last Year:     [address] in the Last Year:    Transportation Needs:     Lack of Transportation (Medical):      Lack of Transportation (Non-Medical):    Physical Activity:     Days of Exercise per Week:  Minutes of Exercise per Session:    Stress:     Feeling of Stress :    Social Connections:     Frequency of Communication with Friends and Family:     Frequency of Social Gatherings with Friends and Family:     Attends Denominational Services:     Active Member of Clubs or Organizations:     Attends Club or Organization Meetings:     Marital Status:    Intimate Partner Violence:     Fear of Current or Ex-Partner:     Emotionally Abused:     Physically Abused:     Sexually Abused:      Family History   Problem Relation Age of Onset    Cancer Father      Objective:   Physical Exam  Constitutional:       General: He is not in acute distress. Appearance: Normal appearance. He is well-developed. He is obese. He is not diaphoretic. HENT:      Head: Normocephalic. Nose: Nose normal.   Eyes:      General: No scleral icterus. Conjunctiva/sclera: Conjunctivae normal.      Pupils: Pupils are equal, round, and reactive to light. Neck:      Thyroid: No thyromegaly. Vascular: No JVD. Trachea: No tracheal deviation. Cardiovascular:      Rate and Rhythm: Normal rate and regular rhythm. Heart sounds: Normal heart sounds. No murmur heard. No friction rub. No gallop. Pulmonary:      Effort: Pulmonary effort is normal. No respiratory distress. Breath sounds: Normal breath sounds. No stridor. No wheezing or rales. Abdominal:      General: Bowel sounds are normal. There is no distension. Palpations: Abdomen is soft. There is no mass. Tenderness: There is no abdominal tenderness. There is no guarding. Musculoskeletal:         General: No deformity. Cervical back: Normal range of motion and neck supple. Comments: Uses an electric wheelchair/scooter for mobility chronically. 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 Skin:     General: Skin is warm and dry.       Capillary Refill: Capillary refill takes less than 2 seconds. Findings: No erythema or rash. Neurological:      Mental Status: He is alert and oriented to person, place, and time. Cranial Nerves: No cranial nerve deficit. Coordination: Coordination normal.   Psychiatric:         Mood and Affect: Mood normal.         Behavior: Behavior normal.         Thought Content: Thought content normal.         Judgment: Judgment normal.        Vitals:    07/28/21 1102 07/28/21 1105   BP: 124/78 122/78   Site: Right Upper Arm Left Upper Arm   Position: Sitting Sitting   Pulse: 100    Temp: 97.6 °F (36.4 °C)    TempSrc: Infrared    SpO2: 95%    Weight: 250 lb (113.4 kg)    Height: 5' 11\" (1.803 m)          Assessment:      I personally reviewed the CTA in question. This does show what looks to be an enlarged coronary sinus however the cut through the aortic root is oblique at this location and makes it difficult to make this anatomic call. I could not review the echocardiogram that was done as the images were not available on the PACS system at the time of his visit. Plan: At this point establishing follow-up for the sinus of Valsalva issue is what is important. Right now nothing else needs to be done other than him complete his treatment for his pulmonary embolus. I am going to get another CTA with a focus on his aortic root. We will order this for 6 months from now. We will see him in the office once again as soon as this new study is available. Mr. Esau Encinas knows he is welcome to call return to our office at anytime in between should any new problems, questions or concerns arise. Very much appreciate the opportunity to see him in consultation today. It was a pleasure meeting him.

## 2021-09-30 DIAGNOSIS — Z01.812 PRE-PROCEDURE LAB EXAM: ICD-10-CM

## 2021-09-30 DIAGNOSIS — Q25.43 ANEURYSM OF AORTIC SINUS OF VALSALVA WITHOUT RUPTURE: Primary | ICD-10-CM

## 2021-10-01 ENCOUNTER — TELEPHONE (OUTPATIENT)
Dept: CARDIOTHORACIC SURGERY | Age: 66
End: 2021-10-01

## 2021-10-01 NOTE — TELEPHONE ENCOUNTER
Spoke with patient regarding schedule of CTA chest with contrast on 10/20/2021 at 10:30 am with arrival time of 10:30 am at [facility], [facility]. for assessment of his sinus valsalva aneurysm. Patient has been instructed not to eat or drink 4 hours prior to the scan and a blood draw will be completed upon admit to check his BUN and Creatinine levels prior to administration of contrast medium. Patient is scheduled for office visit on 10/27/2021 at 1:00 pm with Dr. Telma Soto to discuss results.

## 2021-11-09 ENCOUNTER — TELEPHONE (OUTPATIENT)
Dept: CARDIOTHORACIC SURGERY | Age: 66
End: 2021-11-09

## 2021-11-09 NOTE — TELEPHONE ENCOUNTER
Spoke with patient regarding re-schedule of CTA chest with contrast to 11/19/2021 at 1:00 pm with arrival time of 12:00 pm at [facility], [facility].. Patient to see Dr. Todd Scherer in the office on 12/1/2021 @ 2:00 pm to discuss results.

## 2021-11-10 ENCOUNTER — APPOINTMENT (OUTPATIENT)
Dept: GENERAL RADIOLOGY | Age: 66
End: 2021-11-10
Payer: OTHER GOVERNMENT

## 2021-11-10 ENCOUNTER — HOSPITAL ENCOUNTER (EMERGENCY)
Age: 66
Discharge: HOME | End: 2021-11-10
Attending: EMERGENCY MEDICINE
Payer: OTHER GOVERNMENT

## 2021-11-10 ENCOUNTER — APPOINTMENT (OUTPATIENT)
Dept: CT IMAGING | Age: 66
End: 2021-11-10
Payer: OTHER GOVERNMENT

## 2021-11-10 VITALS
OXYGEN SATURATION: 100 % | DIASTOLIC BLOOD PRESSURE: 99 MMHG | WEIGHT: 250 LBS | TEMPERATURE: 98.6 F | SYSTOLIC BLOOD PRESSURE: 148 MMHG | HEART RATE: 66 BPM | HEIGHT: 71 IN | RESPIRATION RATE: 22 BRPM | BODY MASS INDEX: 35 KG/M2

## 2021-11-10 DIAGNOSIS — R07.89 ATYPICAL CHEST PAIN: Primary | ICD-10-CM

## 2021-11-10 LAB
ANION GAP SERPL CALCULATED.3IONS-SCNC: 16 MMOL/L (ref 3–16)
APTT: 33 SEC (ref 26.2–38.6)
BASOPHILS ABSOLUTE: 0 K/UL (ref 0–0.2)
BASOPHILS RELATIVE PERCENT: 0.8 %
BUN BLDV-MCNC: 17 MG/DL (ref 7–20)
CALCIUM SERPL-MCNC: 10.2 MG/DL (ref 8.3–10.6)
CHLORIDE BLD-SCNC: 98 MMOL/L (ref 99–110)
CO2: 22 MMOL/L (ref 21–32)
CREAT SERPL-MCNC: 1.6 MG/DL (ref 0.8–1.3)
EOSINOPHILS ABSOLUTE: 0 K/UL (ref 0–0.6)
EOSINOPHILS RELATIVE PERCENT: 0.8 %
GFR AFRICAN AMERICAN: 53
GFR NON-AFRICAN AMERICAN: 44
GLUCOSE BLD-MCNC: 120 MG/DL (ref 70–99)
HCT VFR BLD CALC: 44.4 % (ref 40.5–52.5)
HEMOGLOBIN: 14 G/DL (ref 13.5–17.5)
INR BLD: 1.03 (ref 0.88–1.12)
LYMPHOCYTES ABSOLUTE: 0.9 K/UL (ref 1–5.1)
LYMPHOCYTES RELATIVE PERCENT: 16.2 %
MCH RBC QN AUTO: 26.2 PG (ref 26–34)
MCHC RBC AUTO-ENTMCNC: 31.5 G/DL (ref 31–36)
MCV RBC AUTO: 83.2 FL (ref 80–100)
MONOCYTES ABSOLUTE: 0.5 K/UL (ref 0–1.3)
MONOCYTES RELATIVE PERCENT: 9.8 %
NEUTROPHILS ABSOLUTE: 3.8 K/UL (ref 1.7–7.7)
NEUTROPHILS RELATIVE PERCENT: 72.4 %
PDW BLD-RTO: 16.6 % (ref 12.4–15.4)
PLATELET # BLD: 292 K/UL (ref 135–450)
PMV BLD AUTO: 7.1 FL (ref 5–10.5)
POTASSIUM REFLEX MAGNESIUM: 4.3 MMOL/L (ref 3.5–5.1)
PRO-BNP: 27 PG/ML (ref 0–124)
PROTHROMBIN TIME: 11.7 SEC (ref 9.9–12.7)
RBC # BLD: 5.34 M/UL (ref 4.2–5.9)
SODIUM BLD-SCNC: 136 MMOL/L (ref 136–145)
TROPONIN: <0.01 NG/ML
TROPONIN: <0.01 NG/ML
WBC # BLD: 5.3 K/UL (ref 4–11)

## 2021-11-10 PROCEDURE — 36415 COLL VENOUS BLD VENIPUNCTURE: CPT

## 2021-11-10 PROCEDURE — 80048 BASIC METABOLIC PNL TOTAL CA: CPT

## 2021-11-10 PROCEDURE — 6360000004 HC RX CONTRAST MEDICATION: Performed by: EMERGENCY MEDICINE

## 2021-11-10 PROCEDURE — 96374 THER/PROPH/DIAG INJ IV PUSH: CPT

## 2021-11-10 PROCEDURE — 83880 ASSAY OF NATRIURETIC PEPTIDE: CPT

## 2021-11-10 PROCEDURE — 6370000000 HC RX 637 (ALT 250 FOR IP): Performed by: EMERGENCY MEDICINE

## 2021-11-10 PROCEDURE — 99285 EMERGENCY DEPT VISIT HI MDM: CPT

## 2021-11-10 PROCEDURE — 71275 CT ANGIOGRAPHY CHEST: CPT

## 2021-11-10 PROCEDURE — 85730 THROMBOPLASTIN TIME PARTIAL: CPT

## 2021-11-10 PROCEDURE — 6360000002 HC RX W HCPCS: Performed by: EMERGENCY MEDICINE

## 2021-11-10 PROCEDURE — 84484 ASSAY OF TROPONIN QUANT: CPT

## 2021-11-10 PROCEDURE — 93005 ELECTROCARDIOGRAM TRACING: CPT

## 2021-11-10 PROCEDURE — 85025 COMPLETE CBC W/AUTO DIFF WBC: CPT

## 2021-11-10 PROCEDURE — 85610 PROTHROMBIN TIME: CPT

## 2021-11-10 RX ORDER — MORPHINE SULFATE 4 MG/ML
4 INJECTION, SOLUTION INTRAMUSCULAR; INTRAVENOUS ONCE
Status: COMPLETED | OUTPATIENT
Start: 2021-11-10 | End: 2021-11-10

## 2021-11-10 RX ORDER — OXYCODONE AND ACETAMINOPHEN 10; 325 MG/1; MG/1
1 TABLET ORAL ONCE
Status: COMPLETED | OUTPATIENT
Start: 2021-11-10 | End: 2021-11-10

## 2021-11-10 RX ADMIN — OXYCODONE AND ACETAMINOPHEN 1 TABLET: 10; 325 TABLET ORAL at 15:11

## 2021-11-10 RX ADMIN — MORPHINE SULFATE 4 MG: 4 INJECTION INTRAVENOUS at 12:26

## 2021-11-10 RX ADMIN — IOPAMIDOL 80 ML: 755 INJECTION, SOLUTION INTRAVENOUS at 13:33

## 2021-11-10 ASSESSMENT — PAIN DESCRIPTION - LOCATION
LOCATION: BACK
LOCATION: BACK

## 2021-11-10 ASSESSMENT — PAIN DESCRIPTION - ORIENTATION
ORIENTATION: RIGHT
ORIENTATION: RIGHT;LOWER

## 2021-11-10 ASSESSMENT — PAIN DESCRIPTION - PAIN TYPE
TYPE: CHRONIC PAIN
TYPE: ACUTE PAIN

## 2021-11-10 ASSESSMENT — PAIN SCALES - GENERAL
PAINLEVEL_OUTOF10: 9

## 2021-11-10 NOTE — ED PROVIDER NOTES
[address] [facility] ER          ATTENDING PHYSICIAN NOTE       Date of evaluation: 11/10/2021    Chief Complaint     Shortness of Breath (started having SOB last night. Pt states feels like when he had a PE last year. )      History of Present Illness     Rajani Boothe is a 60 y.o. male who presents to the emergency department today complaining of heaviness in his chest.  He states this started last night while he was watching television in his house. He states it got a little bit better but then came back again this morning was much worse. He reports no radiation of the pain he reports no tearing or ripping sensation along with this. He denies any exertional worsening he reports no nausea or vomiting. Patient reports no abdominal states his appetite has been normal.  States this feels similar to when he had a pulmonary embolism about a year ago. Denies fever coughs or chills recently. Review of Systems     Review of Systems  All systems were reviewed with the patient/family and negative except as otherwise documented in the HPI. Past Medical, Surgical, Family, and Social History     He has a past medical history of DJD (degenerative joint disease), HYPERCHOLESTERAEMIA, Hypertension, Psoriasis, Pulmonary embolism ([address] Sarcoidosis, Sinus of Valsalva aneurysm, and Spinal cord injuries. He has a past surgical history that includes back surgery; hernia repair; Gallbladder surgery; other surgical history (05/01/2018); Upper gastrointestinal endoscopy (12/24/2018); Colonoscopy (12/24/2018); and Neck surgery. His family history includes Cancer in his father. He reports that he quit smoking about 8 years ago. His smoking use included cigarettes. He has never used smokeless tobacco. He reports that he does not drink alcohol and does not use drugs.     Medications     Discharge Medication List as of 11/10/2021  4:11 PM      CONTINUE these medications which have NOT CHANGED Details   apixaban (ELIQUIS) 5 MG TABS tablet Take 5 mg by mouth 2 times dailyHistorical Med      empagliflozin (JARDIANCE) 10 MG tablet Take 10 mg by mouth dailyHistorical Med      ascorbic acid (VITAMIN C) 500 MG tablet Take 500 mg by mouth dailyHistorical Med      acetaminophen (TYLENOL) 325 MG tablet Take 650 mg by mouth every 6 hours as needed for PainHistorical Med      diclofenac sodium (VOLTAREN) 1 % GEL Apply topically 2 times daily, Topical, 2 TIMES DAILY, Historical Med      polyethylene glycol (GLYCOLAX) 17 GM/SCOOP powder Take 17 g by mouth dailyHistorical Med      blood glucose monitor strips Test 2 times a day & as needed for symptoms of irregular blood glucose. Dispense sufficient amount for indicated testing frequency plus additional to accommodate PRN testing needs. , Disp-200 strip, R-0, Normal      blood glucose monitor kit and supplies Dispense sufficient amount for indicated testing frequency plus additional to accommodate PRN testing needs.  Dispense all needed supplies to include: monitor, strips, lancing device, lancets, control solutions, alcohol swabs., Disp-1 kit, R-0, Normal      lisinopril (PRINIVIL;ZESTRIL) 40 MG tablet Take 1 tablet by mouth daily Hold this medicine for now until you have follow-up with your regular physician., Disp-30 tablet, R-3Adjust Sig      chlorthalidone (HYGROTON) 25 MG tablet Take 1 tablet by mouth daily Hold this medicine until you have follow-up with your regular physician., Disp-30 tablet, R-3Adjust Sig      lidocaine 4 % external patch Place 1 patch onto the skin daily as needed (apply to shoulders if pain), Transdermal, DAILY PRN, Historical Med      fluocinolone (SYNALAR) 0.025 % cream Apply topically 2 times daily as needed (itching related to psoriasis) Apply topically, Topical, 2 TIMES DAILY PRN, Historical Med      hypromellose (ISOPTO TEARS) 0.5 % ophthalmic solution Place 1 drop into both eyes 4 times daily as needed (dry eyes)Historical Med senna-docusate (PERICOLACE) 8.6-50 MG per tablet Take 1-2 tablets by mouth 2 times daily as needed for ConstipationHistorical Med      tacrolimus (PROTOPIC) 0.1 % ointment Apply topically 2 times daily as needed (apply topically to scalp if itching from psoriasis) Apply topically, Topical, 2 TIMES DAILY PRN, Historical Med      vitamin E 400 UNIT capsule Take 400 Units by mouth dailyHistorical Med      vitamin D (CHOLECALCIFEROL) 25 MCG (1000 UT) TABS tablet Take 1,000 Units by mouth daily Historical Med      albuterol sulfate  (90 Base) MCG/ACT inhaler Inhale 2 puffs into the lungs every 6 hours as needed for Shortness of Breath Historical Med             Allergies     He is allergic to pregabalin, baclofen, ibuprofen, robaxin [methocarbamol], and tizanidine. Physical Exam     INITIAL VITALS: BP: (!) 141/96, Temp: 98.6 °F (37 °C), Pulse: 62, Resp: 25, SpO2: 100 %   Physical Exam  Constitutional: Well-nourished appearing male sitting up in the hospital stretcher comfortable and in no acute distress  Eyes:  Sclera anicteric. HEENT:  Normocephalic, oropharynx moist.   Neck: normal range of motion, supple. Respiratory:  Even and non-labored, no distress, clear to auscultation bilaterally speaking in full sentences without difficulty  Cardiovascular:  Extremities warm, well perfused, no audible murmur rub or gallop, equal pulses in all extremities  GI:  Soft, nondistended   Musculoskeletal:  No edema, no deformities. Skin:  No rash, no lesions, no pallor   Neurologic:  Awake and alert. Moving all extremities purposefully and with grossly normal coordination. Psychiatric:  Normal mood. Behavior appropriate. Diagnostic Results     EKG   Normal sinus rhythm, incomplete right bundle branch pattern with some early repolarization phenomenon ST elevations which appear unchanged compared to his baseline EKG, no reciprocal changes.     RADIOLOGY:  CTA PULMONARY W CONTRAST   Final Result      No pulmonary embolism seen. Right basilar infiltrate with right-sided hilar adenopathy and question early mild mediastinal adenopathy. LABS:   Results for orders placed or performed during the hospital encounter of 11/10/21   CBC Auto Differential   Result Value Ref Range    WBC 5.3 4.0 - 11.0 K/uL    RBC 5.34 4.20 - 5.90 M/uL    Hemoglobin 14.0 13.5 - 17.5 g/dL    Hematocrit 44.4 40.5 - 52.5 %    MCV 83.2 80.0 - 100.0 fL    MCH 26.2 26.0 - 34.0 pg    MCHC 31.5 31.0 - 36.0 g/dL    RDW 16.6 (H) 12.4 - 15.4 %    Platelets 669 624 - 659 K/uL    MPV 7.1 5.0 - 10.5 fL    Neutrophils % 72.4 %    Lymphocytes % 16.2 %    Monocytes % 9.8 %    Eosinophils % 0.8 %    Basophils % 0.8 %    Neutrophils Absolute 3.8 1.7 - 7.7 K/uL    Lymphocytes Absolute 0.9 (L) 1.0 - 5.1 K/uL    Monocytes Absolute 0.5 0.0 - 1.3 K/uL    Eosinophils Absolute 0.0 0.0 - 0.6 K/uL    Basophils Absolute 0.0 0.0 - 0.2 K/uL   Basic Metabolic Panel w/ Reflex to MG   Result Value Ref Range    Sodium 136 136 - 145 mmol/L    Potassium reflex Magnesium 4.3 3.5 - 5.1 mmol/L    Chloride 98 (L) 99 - 110 mmol/L    CO2 22 21 - 32 mmol/L    Anion Gap 16 3 - 16    Glucose 120 (H) 70 - 99 mg/dL    BUN 17 7 - 20 mg/dL    CREATININE 1.6 (H) 0.8 - 1.3 mg/dL    GFR Non- 44 (A) >60    GFR  53 (A) >60    Calcium 10.2 8.3 - 10.6 mg/dL   Protime-INR   Result Value Ref Range    Protime 11.7 9.9 - 12.7 sec    INR 1.03 0.88 - 1.12   APTT   Result Value Ref Range    aPTT 33.0 26.2 - 38.6 sec   Brain Natriuretic Peptide   Result Value Ref Range    Pro-BNP 27 0 - 124 pg/mL   Troponin   Result Value Ref Range    Troponin <0.01 <0.01 ng/mL   Troponin   Result Value Ref Range    Troponin <0.01 <0.01 ng/mL         RECENT VITALS:  BP: (!) 148/99,Temp: 98.6 °F (37 °C), Pulse: 66, Resp: 22, SpO2: 100 %     Procedures         ED Course     Nursing Notes, Past Medical Hx, Past Surgical Hx, Social Hx,Allergies, and Family Hx were reviewed.     patient was

## 2021-11-10 NOTE — ED NOTES
2nd troponin drawn. Pain medication given per order. Call light in reach.       Courtney Pride RN  11/10/21 1963

## 2021-11-17 LAB
EKG ATRIAL RATE: 64 BPM
EKG DIAGNOSIS: NORMAL
EKG P AXIS: 13 DEGREES
EKG P-R INTERVAL: 182 MS
EKG Q-T INTERVAL: 424 MS
EKG QRS DURATION: 134 MS
EKG QTC CALCULATION (BAZETT): 437 MS
EKG R AXIS: -21 DEGREES
EKG T AXIS: 14 DEGREES
EKG VENTRICULAR RATE: 64 BPM

## 2021-11-18 ENCOUNTER — TELEPHONE (OUTPATIENT)
Dept: CARDIOTHORACIC SURGERY | Age: 66
End: 2021-11-18

## 2021-11-18 DIAGNOSIS — Q25.49 SINUS OF VALSALVA ANEURYSM: Primary | ICD-10-CM

## 2021-11-18 NOTE — TELEPHONE ENCOUNTER
Spoke with patient regarding schedule of 2D ECHO on 1/3/2022 at 11:00 am with arrival time of 10:30 am at [facility], [facility]. with office visit on 1/5/2022 at 10:00 am with Dr. Alex Oh to discuss results.

## 2021-11-18 NOTE — TELEPHONE ENCOUNTER
Patient is in surveillance for sinus valsalva aneurysm. Dr. Telma Soto reviewed CTA pulmonary w/contrast performed on 11/10/21 and would like for patient to have a repeat TTE in January. Appointment with Dr. Telma Soto on 12/1/21 cancelled and we will reschedule for patient to come in after TTE is completed. Patient is aware and agreeable with plan. Sanaz Keys, our  notified.

## 2022-01-03 ENCOUNTER — HOSPITAL ENCOUNTER (OUTPATIENT)
Dept: NON INVASIVE DIAGNOSTICS | Age: 67
Discharge: HOME | End: 2022-01-03
Payer: OTHER GOVERNMENT

## 2022-01-03 DIAGNOSIS — Q25.49 SINUS OF VALSALVA ANEURYSM: ICD-10-CM

## 2022-01-03 LAB
LV EF: 53 %
LVEF MODALITY: NORMAL

## 2022-01-03 PROCEDURE — 93306 TTE W/DOPPLER COMPLETE: CPT

## 2022-01-06 ENCOUNTER — TELEPHONE (OUTPATIENT)
Dept: CARDIOTHORACIC SURGERY | Age: 67
End: 2022-01-06

## 2022-01-06 DIAGNOSIS — I77.89 AORTIC ROOT ENLARGEMENT (HCC): ICD-10-CM

## 2022-01-06 DIAGNOSIS — Q25.49 SINUS OF VALSALVA ANEURYSM: Primary | ICD-10-CM

## 2022-01-06 NOTE — TELEPHONE ENCOUNTER
Dr. Des Al reviewed CTA pulmonary w/contrast performed on 11/10/21 and Echo 2D performed on 1/3/22. He would like for patient have a cardiac MRI. Patient is aware and agreeable. Order placed in [address] [place] [address] Rd and Ade Waller, our  notified.

## 2022-01-06 NOTE — TELEPHONE ENCOUNTER
Spoke with patient regarding schedule of cardiac MRI w/wo contrast on 1/20/2022 at 10:00 am with arrival time of 9:30 am at [facility].. Instructions of no food or drinks 4 hours prior and no caffeine 12 hours prior relayed to patient.

## 2022-02-11 ENCOUNTER — HOSPITAL ENCOUNTER (OUTPATIENT)
Dept: MRI IMAGING | Age: 67
Discharge: HOME | End: 2022-02-11
Payer: OTHER GOVERNMENT

## 2022-02-11 DIAGNOSIS — Q25.49 SINUS OF VALSALVA ANEURYSM: ICD-10-CM

## 2022-02-11 DIAGNOSIS — I77.89 AORTIC ROOT ENLARGEMENT (HCC): ICD-10-CM

## 2022-02-11 PROCEDURE — A9585 GADOBUTROL INJECTION: HCPCS | Performed by: THORACIC SURGERY (CARDIOTHORACIC VASCULAR SURGERY)

## 2022-02-11 PROCEDURE — 75565 CARD MRI VELOC FLOW MAPPING: CPT

## 2022-02-11 PROCEDURE — 6360000004 HC RX CONTRAST MEDICATION: Performed by: THORACIC SURGERY (CARDIOTHORACIC VASCULAR SURGERY)

## 2022-02-11 RX ADMIN — GADOBUTROL 17 ML: 604.72 INJECTION INTRAVENOUS at 12:18

## 2022-02-14 LAB
LV EF: 47 %
LVEF MODALITY: NORMAL

## 2022-02-17 ENCOUNTER — TELEPHONE (OUTPATIENT)
Dept: CARDIOTHORACIC SURGERY | Age: 67
End: 2022-02-17

## 2022-02-17 NOTE — TELEPHONE ENCOUNTER
Dr. Thee Escobar reviewed cardiac MRI performed on 2/14/22 and states that there is no sinus of valsalva aneurysm on MRI. He did note a slightly enlarged aortic root when he previously reviewed the CTA pulmonary w/contrast performed on 11/10/21. He would like to repeat a CT chest w/o contrast in 1 year. Patient is aware and agreeable with plan.

## 2022-10-21 NOTE — PLAN OF CARE
ATTENDING PHYSICIAN:  Mayte Mccall MD     PROCEDURE:  1  Right L5 transforaminal epidural steroid injection under fluoroscopic guidance  2  Right S1 transforaminal epidural steroid injection under fluoroscopic guidance  PRE-PROCEDURE DIAGNOSIS:  Low back pain and right lower extremity radiculopathy  POST-PROCEDURE DIAGNOSIS:  Low back pain and right lower extremity radiculopathy  ANESTHESIA:  Local     ESTIMATED BLOOD LOSS:  Minimal     COMPLICATIONS:  None  LOCATION:  [address]  CONSENT:  Today's procedure, its potential benefits as well as its risks and potential side effects were reviewed  Discussed risks of the procedure including bleeding, infection, nerve irritation or damage, reactions to the medications, weakness, headache, failure of the pain to improve, and potential worsening of the pain were explained to the patient who verbalized understanding and who wished to proceed  Written informed consent was thereby obtained  DESCRIPTION OF THE PROCEDURE:  After written informed consent was obtained, the patient was taken to the fluoroscopy suite and placed in the prone position  Anatomical landmarks were identified by way of fluoroscopy in multiple views  The skin of the lumbar region was prepped using antiseptic and draped in the usual sterile fashion  Strict aseptic technique was utilized  The skin and subcutaneous tissues at the needle entry site were infiltrated with a total of 5 mL of 1% preservative-free lidocaine using a 25-gauge 1-1/2-inch needle  22-gauge needles were then incrementally advanced under fluoroscopic guidance in the oblique view into the neural foramina as mentioned above  Proper placement into each of the neural foramen was confirmed with fluoroscopy in both the lateral and AP views   After negative aspiration for CSF or heme, contrast was injected, which delineated the nerve roots and the epidural space under fluoroscopy in the AP Problem: Falls - Risk of:  Goal: Will remain free from falls  Description: Will remain free from falls  Outcome: Ongoing  Goal: Absence of physical injury  Description: Absence of physical injury  Outcome: Ongoing     Problem: Pain:  Goal: Pain level will decrease  Description: Pain level will decrease  Outcome: Ongoing  Goal: Control of acute pain  Description: Control of acute pain  Outcome: Ongoing  Goal: Control of chronic pain  Description: Control of chronic pain  Outcome: Ongoing     Problem: Infection - Central Venous Catheter-Associated Bloodstream Infection:  Goal: Will show no infection signs and symptoms  Description: Will show no infection signs and symptoms  Outcome: Ongoing     Problem: Bleeding:  Goal: Will show no signs and symptoms of excessive bleeding  Description: Will show no signs and symptoms of excessive bleeding  Outcome: Ongoing     Problem: Discharge Planning:  Goal: Discharged to appropriate level of care  Description: Discharged to appropriate level of care  Outcome: Ongoing     Problem: Cardiac Output - Decreased:  Goal: Avoidance of environmental tobacco smoke  Description: Absence of orthostatic hypotension  Outcome: Ongoing  Goal: Cardiac output within specified parameters  Description: Cardiac output within specified parameters  Outcome: Ongoing  Goal: Hemodynamic stability will improve  Description: Hemodynamic stability will improve  Outcome: Ongoing     Problem: Fluid Volume - Imbalance:  Goal: Absence of imbalanced fluid volume signs and symptoms  Description: Absence of imbalanced fluid volume signs and symptoms  Outcome: Ongoing     Problem: Tissue Perfusion, Altered:  Goal: Circulatory function within specified parameters  Description: Circulatory function within specified parameters  Outcome: Ongoing     Problem: Tissue Perfusion - Cardiopulmonary, Altered:  Goal: Absence of angina  Description: Absence of angina  Outcome: Ongoing  Goal: Hemodynamic stability will improve  Description: Hemodynamic stability will improve  Outcome: Ongoing view  There was only a transient pressure paresthesia that resolved immediately upon injection  After negative aspiration, a 2 mL of a 4 mL injectate consisting of 3 mL of preservative-free 0 25% bupivacaine and 1 mL of Depo-Medrol 80 mg/mL was slowly injected into each of the needles as delineated above  The patient tolerated the procedure well and all needles were removed intact  Hemostasis was maintained  There were no apparent paresthesias or complications  The skin was wiped clean and a Band-Aid was placed as appropriate  The patient was monitored for an appropriate period of time and remained hemodynamically stable following the procedure  The patient was ultimately discharged to home with supervision in good condition and instructed to call the office in approximately 7-10 days with an update or sooner as warranted  Discharge instructions were provided  I was present for and participated in all key and critical portions of this procedure      Shiv Nichole  10/21/2022  10:04 AM

## 2022-10-26 DIAGNOSIS — Q25.43 ANEURYSM OF AORTIC SINUS OF VALSALVA WITHOUT RUPTURE: Primary | ICD-10-CM

## 2022-10-26 DIAGNOSIS — I77.810 DILATED AORTIC ROOT (HCC): ICD-10-CM

## 2022-11-23 ENCOUNTER — TELEPHONE (OUTPATIENT)
Dept: CARDIOTHORACIC SURGERY | Age: 67
End: 2022-11-23

## 2022-11-23 NOTE — TELEPHONE ENCOUNTER
Spoke with patient regarding schedule of CT chest without contrast on 12/12/2022 at 11:30 am with arrival time of 11:00 am at [facility]. for assessment of his sinus valsalva aneurysm with dilated aortic root.

## 2022-12-06 ENCOUNTER — APPOINTMENT (OUTPATIENT)
Dept: GENERAL RADIOLOGY | Age: 67
End: 2022-12-06
Payer: OTHER GOVERNMENT

## 2022-12-06 ENCOUNTER — HOSPITAL ENCOUNTER (EMERGENCY)
Age: 67
Discharge: HOME | End: 2022-12-06
Attending: EMERGENCY MEDICINE
Payer: OTHER GOVERNMENT

## 2022-12-06 ENCOUNTER — APPOINTMENT (OUTPATIENT)
Dept: CT IMAGING | Age: 67
End: 2022-12-06
Payer: OTHER GOVERNMENT

## 2022-12-06 VITALS
TEMPERATURE: 97.6 F | HEIGHT: 71 IN | RESPIRATION RATE: 16 BRPM | BODY MASS INDEX: 33.46 KG/M2 | DIASTOLIC BLOOD PRESSURE: 80 MMHG | SYSTOLIC BLOOD PRESSURE: 122 MMHG | HEART RATE: 60 BPM | OXYGEN SATURATION: 99 % | WEIGHT: 239 LBS

## 2022-12-06 DIAGNOSIS — M54.31 BILATERAL SCIATICA: ICD-10-CM

## 2022-12-06 DIAGNOSIS — M54.50 ACUTE EXACERBATION OF CHRONIC LOW BACK PAIN: Primary | ICD-10-CM

## 2022-12-06 DIAGNOSIS — G89.29 ACUTE EXACERBATION OF CHRONIC LOW BACK PAIN: Primary | ICD-10-CM

## 2022-12-06 DIAGNOSIS — R07.9 CHEST PAIN, UNSPECIFIED TYPE: ICD-10-CM

## 2022-12-06 DIAGNOSIS — M54.32 BILATERAL SCIATICA: ICD-10-CM

## 2022-12-06 LAB
A/G RATIO: 1.4 (ref 1.1–2.2)
ALBUMIN SERPL-MCNC: 4.3 G/DL (ref 3.4–5)
ALP BLD-CCNC: 102 U/L (ref 40–129)
ALT SERPL-CCNC: 18 U/L (ref 10–40)
ANION GAP SERPL CALCULATED.3IONS-SCNC: 13 MMOL/L (ref 3–16)
AST SERPL-CCNC: 20 U/L (ref 15–37)
BASOPHILS ABSOLUTE: 0.1 K/UL (ref 0–0.2)
BASOPHILS RELATIVE PERCENT: 0.8 %
BILIRUB SERPL-MCNC: 0.3 MG/DL (ref 0–1)
BUN BLDV-MCNC: 26 MG/DL (ref 7–20)
CALCIUM SERPL-MCNC: 10.1 MG/DL (ref 8.3–10.6)
CHLORIDE BLD-SCNC: 102 MMOL/L (ref 99–110)
CO2: 24 MMOL/L (ref 21–32)
CREAT SERPL-MCNC: 1.6 MG/DL (ref 0.8–1.3)
EKG ATRIAL RATE: 63 BPM
EKG DIAGNOSIS: NORMAL
EKG P AXIS: 46 DEGREES
EKG P-R INTERVAL: 204 MS
EKG Q-T INTERVAL: 434 MS
EKG QRS DURATION: 144 MS
EKG QTC CALCULATION (BAZETT): 444 MS
EKG R AXIS: -21 DEGREES
EKG T AXIS: 38 DEGREES
EKG VENTRICULAR RATE: 63 BPM
EOSINOPHILS ABSOLUTE: 0 K/UL (ref 0–0.6)
EOSINOPHILS RELATIVE PERCENT: 0.5 %
GFR SERPL CREATININE-BSD FRML MDRD: 47 ML/MIN/{1.73_M2}
GLUCOSE BLD-MCNC: 110 MG/DL (ref 70–99)
HCT VFR BLD CALC: 45.4 % (ref 40.5–52.5)
HEMOGLOBIN: 14.9 G/DL (ref 13.5–17.5)
LYMPHOCYTES ABSOLUTE: 0.9 K/UL (ref 1–5.1)
LYMPHOCYTES RELATIVE PERCENT: 12.9 %
MCH RBC QN AUTO: 26.7 PG (ref 26–34)
MCHC RBC AUTO-ENTMCNC: 32.7 G/DL (ref 31–36)
MCV RBC AUTO: 81.6 FL (ref 80–100)
MONOCYTES ABSOLUTE: 0.8 K/UL (ref 0–1.3)
MONOCYTES RELATIVE PERCENT: 12 %
NEUTROPHILS ABSOLUTE: 5.2 K/UL (ref 1.7–7.7)
NEUTROPHILS RELATIVE PERCENT: 73.8 %
PDW BLD-RTO: 16.4 % (ref 12.4–15.4)
PLATELET # BLD: 230 K/UL (ref 135–450)
PMV BLD AUTO: 7.7 FL (ref 5–10.5)
POTASSIUM REFLEX MAGNESIUM: 3.7 MMOL/L (ref 3.5–5.1)
PRO-BNP: <5 PG/ML (ref 0–124)
RBC # BLD: 5.57 M/UL (ref 4.2–5.9)
SODIUM BLD-SCNC: 139 MMOL/L (ref 136–145)
TOTAL PROTEIN: 7.4 G/DL (ref 6.4–8.2)
TROPONIN: 0.02 NG/ML
TROPONIN: 0.02 NG/ML
WBC # BLD: 7 K/UL (ref 4–11)

## 2022-12-06 PROCEDURE — 99285 EMERGENCY DEPT VISIT HI MDM: CPT

## 2022-12-06 PROCEDURE — 85025 COMPLETE CBC W/AUTO DIFF WBC: CPT

## 2022-12-06 PROCEDURE — 84484 ASSAY OF TROPONIN QUANT: CPT

## 2022-12-06 PROCEDURE — 71250 CT THORAX DX C-: CPT

## 2022-12-06 PROCEDURE — 6360000002 HC RX W HCPCS: Performed by: EMERGENCY MEDICINE

## 2022-12-06 PROCEDURE — 80053 COMPREHEN METABOLIC PANEL: CPT

## 2022-12-06 PROCEDURE — 6370000000 HC RX 637 (ALT 250 FOR IP): Performed by: EMERGENCY MEDICINE

## 2022-12-06 PROCEDURE — 83880 ASSAY OF NATRIURETIC PEPTIDE: CPT

## 2022-12-06 PROCEDURE — 71046 X-RAY EXAM CHEST 2 VIEWS: CPT

## 2022-12-06 PROCEDURE — 93005 ELECTROCARDIOGRAM TRACING: CPT | Performed by: EMERGENCY MEDICINE

## 2022-12-06 PROCEDURE — 96376 TX/PRO/DX INJ SAME DRUG ADON: CPT

## 2022-12-06 PROCEDURE — 96374 THER/PROPH/DIAG INJ IV PUSH: CPT

## 2022-12-06 RX ORDER — MORPHINE SULFATE 10 MG/ML
4 INJECTION, SOLUTION INTRAMUSCULAR; INTRAVENOUS ONCE
Status: COMPLETED | OUTPATIENT
Start: 2022-12-06 | End: 2022-12-06

## 2022-12-06 RX ORDER — METHYLPREDNISOLONE 4 MG/1
TABLET ORAL
Qty: 1 KIT | Refills: 0 | Status: SHIPPED | OUTPATIENT
Start: 2022-12-06 | End: 2022-12-12

## 2022-12-06 RX ORDER — DIAZEPAM 5 MG/1
5 TABLET ORAL ONCE
Status: COMPLETED | OUTPATIENT
Start: 2022-12-06 | End: 2022-12-06

## 2022-12-06 RX ORDER — MORPHINE SULFATE 10 MG/ML
4 INJECTION, SOLUTION INTRAMUSCULAR; INTRAVENOUS
Status: COMPLETED | OUTPATIENT
Start: 2022-12-06 | End: 2022-12-06

## 2022-12-06 RX ORDER — DIAZEPAM 5 MG/1
5 TABLET ORAL EVERY 8 HOURS PRN
Qty: 8 TABLET | Refills: 0 | Status: SHIPPED | OUTPATIENT
Start: 2022-12-06 | End: 2022-12-09

## 2022-12-06 RX ADMIN — DIAZEPAM 5 MG: 5 TABLET ORAL at 13:43

## 2022-12-06 RX ADMIN — MORPHINE SULFATE 4 MG: 10 INJECTION, SOLUTION INTRAMUSCULAR; INTRAVENOUS at 13:44

## 2022-12-06 RX ADMIN — MORPHINE SULFATE 4 MG: 10 INJECTION, SOLUTION INTRAMUSCULAR; INTRAVENOUS at 17:05

## 2022-12-06 ASSESSMENT — ENCOUNTER SYMPTOMS
DIARRHEA: 0
SHORTNESS OF BREATH: 1
COUGH: 0
NAUSEA: 0
BACK PAIN: 1
ABDOMINAL PAIN: 0
VOMITING: 0

## 2022-12-06 ASSESSMENT — PAIN DESCRIPTION - LOCATION
LOCATION: BACK;CHEST
LOCATION: BACK

## 2022-12-06 ASSESSMENT — PAIN SCALES - GENERAL
PAINLEVEL_OUTOF10: 10

## 2022-12-06 ASSESSMENT — PAIN - FUNCTIONAL ASSESSMENT: PAIN_FUNCTIONAL_ASSESSMENT: 0-10

## 2022-12-06 ASSESSMENT — PAIN DESCRIPTION - DESCRIPTORS: DESCRIPTORS: DISCOMFORT

## 2022-12-06 NOTE — ED PROVIDER NOTES
[address] ENCOUNTER          ATTENDING PHYSICIAN NOTE       Date of evaluation: 12/6/2022    Chief Complaint     Back Pain (LOW BACK PAIN WITH RADIATION DOWN BOTH LOWER EXTREMITIES FOR APPROX 3 DAYS) and Chest Pain (PT STATES CHEST TIGHTNESS AND SOB X 1 DAY WITH HX OF PE)      History of Present Illness     Baljeet Laboy is a 78 y.o. male who presents with complaints of low back pain radiating down both legs with increased spasticity in both legs. The patient has a history of low back pain as well as cervical disc disease and reports that he has some spasticity at baseline but it is worse over the last 3 to 4 days. He also complains of a 3 to 4-day history of left-sided sharp chest pain that he cannot well describe for me. It is not clear whether it is worsened with inspiration, but he does describe it as sharp. He has a reported history of PE but is not entirely clear if this pain is similar. He reports no nausea, vomiting, or diaphoresis. He does feel some shortness of breath. Review of Systems     Review of Systems   Constitutional:  Negative for chills, diaphoresis and fever. Respiratory:  Positive for shortness of breath. Negative for cough. Cardiovascular:  Positive for chest pain. Negative for palpitations and leg swelling. Gastrointestinal:  Negative for abdominal pain, diarrhea, nausea and vomiting. Genitourinary:  Positive for difficulty urinating. Negative for decreased urine volume. Musculoskeletal:  Positive for back pain. All other systems reviewed and are negative. Past Medical, Surgical, Family, and Social History     He has a past medical history of DJD (degenerative joint disease), HYPERCHOLESTERAEMIA, Hypertension, Psoriasis, Pulmonary embolism ([address] Sarcoidosis, Sinus of Valsalva aneurysm, and Spinal cord injuries.   He has a past surgical history that includes back surgery; hernia repair; Gallbladder surgery; other surgical history (05/01/2018); Upper gastrointestinal endoscopy (12/24/2018); Colonoscopy (12/24/2018); and Neck surgery. His family history includes Cancer in his father. He reports that he quit smoking about 9 years ago. His smoking use included cigarettes. He has never used smokeless tobacco. He reports that he does not drink alcohol and does not use drugs. Medications     Previous Medications    ACETAMINOPHEN (TYLENOL) 325 MG TABLET    Take 650 mg by mouth every 6 hours as needed for Pain    ALBUTEROL SULFATE  (90 BASE) MCG/ACT INHALER    Inhale 2 puffs into the lungs every 6 hours as needed for Shortness of Breath     APIXABAN (ELIQUIS) 5 MG TABS TABLET    Take 5 mg by mouth 2 times daily    ASCORBIC ACID (VITAMIN C) 500 MG TABLET    Take 500 mg by mouth daily    BLOOD GLUCOSE MONITOR KIT AND SUPPLIES    Dispense sufficient amount for indicated testing frequency plus additional to accommodate PRN testing needs. Dispense all needed supplies to include: monitor, strips, lancing device, lancets, control solutions, alcohol swabs. BLOOD GLUCOSE MONITOR STRIPS    Test 2 times a day & as needed for symptoms of irregular blood glucose. Dispense sufficient amount for indicated testing frequency plus additional to accommodate PRN testing needs. CHLORTHALIDONE (HYGROTON) 25 MG TABLET    Take 1 tablet by mouth daily Hold this medicine until you have follow-up with your regular physician.     DICLOFENAC SODIUM (VOLTAREN) 1 % GEL    Apply topically 2 times daily    EMPAGLIFLOZIN (JARDIANCE) 10 MG TABLET    Take 10 mg by mouth daily    FLUOCINOLONE (SYNALAR) 0.025 % CREAM    Apply topically 2 times daily as needed (itching related to psoriasis) Apply topically    HYPROMELLOSE (ISOPTO TEARS) 0.5 % OPHTHALMIC SOLUTION    Place 1 drop into both eyes 4 times daily as needed (dry eyes)    LIDOCAINE 4 % EXTERNAL PATCH    Place 1 patch onto the skin daily as needed (apply to shoulders if pain)    LISINOPRIL (PRINIVIL;ZESTRIL) 40 MG TABLET    Take 1 tablet by mouth daily Hold this medicine for now until you have follow-up with your regular physician. POLYETHYLENE GLYCOL (GLYCOLAX) 17 GM/SCOOP POWDER    Take 17 g by mouth daily    SENNA-DOCUSATE (PERICOLACE) 8.6-50 MG PER TABLET    Take 1-2 tablets by mouth 2 times daily as needed for Constipation    TACROLIMUS (PROTOPIC) 0.1 % OINTMENT    Apply topically 2 times daily as needed (apply topically to scalp if itching from psoriasis) Apply topically    VITAMIN D (CHOLECALCIFEROL) 25 MCG (1000 UT) TABS TABLET    Take 1,000 Units by mouth daily     VITAMIN E 400 UNIT CAPSULE    Take 400 Units by mouth daily       Allergies     He is allergic to pregabalin, baclofen, ibuprofen, robaxin [methocarbamol], and tizanidine. Physical Exam     INITIAL VITALS: BP: 122/80, Temp: 97.6 °F (36.4 °C), Heart Rate: 60, Resp: 16, SpO2: 99 %   Physical Exam  Vitals and nursing note reviewed. Constitutional:       General: He is not in acute distress. Appearance: He is well-developed. He is not diaphoretic. Eyes:      Extraocular Movements: Extraocular movements intact. Pupils: Pupils are equal, round, and reactive to light. Cardiovascular:      Rate and Rhythm: Normal rate and regular rhythm. Pulmonary:      Effort: Pulmonary effort is normal.      Breath sounds: Normal breath sounds. Abdominal:      General: Bowel sounds are normal. There is no distension. Palpations: Abdomen is soft. Tenderness: There is no abdominal tenderness. Skin:     General: Skin is warm and dry. Findings: No rash. Neurological:      Mental Status: He is alert and oriented to person, place, and time. Motor: Abnormal muscle tone (spacticity, BLEs, chronic but worse) present. Deep Tendon Reflexes:      Reflex Scores:       Patellar reflexes are 1+ on the right side and 1+ on the left side.   Psychiatric:         Behavior: Behavior normal.       Diagnostic Results     EKG   EKG Interpretation    Interpreted by me    Rhythm: normal sinus   Rate: normal  Axis: normal  Ectopy: none  Conduction: right bundle branch block (complete)  ST Segments: No acute change   T Waves: inversion in  v1 and v2  Q Waves: nonspecific    Clinical Impression: no acute changes and old right bundle branch block    RADIOLOGY:  CT CHEST WO CONTRAST   Final Result      Stable mild cardiomegaly and dilation of the ascending thoracic aorta. Pulmonary nodularity, as above. This is more conspicuous than on the prior study likely secondary to is between motion artifact at that time. Per most recent Fleischner Society Guidelines (Radiology 2017, DOI:10.1148/radiol. [id number]), for patients at low risk for lung cancer, no further imaging follow-up is recommended. For high risk patients (e.g. smokers), a follow-up noncontrast chest CT    in 12 months is optional.       Diffuse idiopathic skeletal hyperostosis. XR CHEST (2 VW)   Final Result   Impression: Stable cardiomegaly with perihilar vascular and interstitial prominence reflecting congestion.           LABS:   Results for orders placed or performed during the hospital encounter of 12/06/22   CBC with Auto Differential   Result Value Ref Range    WBC 7.0 4.0 - 11.0 K/uL    RBC 5.57 4.20 - 5.90 M/uL    Hemoglobin 14.9 13.5 - 17.5 g/dL    Hematocrit 45.4 40.5 - 52.5 %    MCV 81.6 80.0 - 100.0 fL    MCH 26.7 26.0 - 34.0 pg    MCHC 32.7 31.0 - 36.0 g/dL    RDW 16.4 (H) 12.4 - 15.4 %    Platelets 614 701 - 619 K/uL    MPV 7.7 5.0 - 10.5 fL    Neutrophils % 73.8 %    Lymphocytes % 12.9 %    Monocytes % 12.0 %    Eosinophils % 0.5 %    Basophils % 0.8 %    Neutrophils Absolute 5.2 1.7 - 7.7 K/uL    Lymphocytes Absolute 0.9 (L) 1.0 - 5.1 K/uL    Monocytes Absolute 0.8 0.0 - 1.3 K/uL    Eosinophils Absolute 0.0 0.0 - 0.6 K/uL    Basophils Absolute 0.1 0.0 - 0.2 K/uL   CMP w/ Reflex to MG   Result Value Ref Range    Sodium 139 136 - 145 mmol/L    Potassium reflex Magnesium 3.7 3.5 - 5.1 mmol/L    Chloride 102 99 - 110 mmol/L    CO2 24 21 - 32 mmol/L    Anion Gap 13 3 - 16    Glucose 110 (H) 70 - 99 mg/dL    BUN 26 (H) 7 - 20 mg/dL    Creatinine 1.6 (H) 0.8 - 1.3 mg/dL    Est, Glom Filt Rate 47 (A) >60    Calcium 10.1 8.3 - 10.6 mg/dL    Total Protein 7.4 6.4 - 8.2 g/dL    Albumin 4.3 3.4 - 5.0 g/dL    Albumin/Globulin Ratio 1.4 1.1 - 2.2    Total Bilirubin 0.3 0.0 - 1.0 mg/dL    Alkaline Phosphatase 102 40 - 129 U/L    ALT 18 10 - 40 U/L    AST 20 15 - 37 U/L   Troponin   Result Value Ref Range    Troponin 0.02 (H) <0.01 ng/mL   Brain Natriuretic Peptide   Result Value Ref Range    Pro-BNP <5 0 - 124 pg/mL   Troponin   Result Value Ref Range    Troponin 0.02 (H) <0.01 ng/mL   EKG 12 Lead   Result Value Ref Range    Ventricular Rate 63 BPM    Atrial Rate 63 BPM    P-R Interval 204 ms    QRS Duration 144 ms    Q-T Interval 434 ms    QTc Calculation (Bazett) 444 ms    P Axis 46 degrees    R Axis -21 degrees    T Axis 38 degrees    Diagnosis       EKG performed in ER and to be interpreted by ER physician. Confirmed by MD, ER ([id number]),  [address] [facility], [address] [place] [address] (Formerly [facility], [facility]) on 12/6/2022 12:57:11 PM       ED BEDSIDE ULTRASOUND:  No results found. RECENT VITALS:  BP: 122/80,Temp: 97.6 °F (36.4 °C), Heart Rate: 60, Resp: 16, SpO2: 99 %     ED Course     Nursing Notes, Past Medical Hx, Past Surgical Hx, Social Hx,Allergies, and Family Hx were reviewed. patient was given the following medications:  Orders Placed This Encounter   Medications    morphine injection 4 mg    diazePAM (VALIUM) tablet 5 mg    morphine injection 4 mg    diazePAM (VALIUM) 5 MG tablet     Sig: Take 1 tablet by mouth every 8 hours as needed (back pain) for up to 8 doses. Dispense:  8 tablet     Refill:  0    methylPREDNISolone (MEDROL, JACE,) 4 MG tablet     Sig: Take by mouth.      Dispense:  1 kit     Refill:  0       CONSULTS:  None    MEDICAL DECISIONMAKING / ASSESSMENT / PLAN     Paola Stout is a 78 y.o. male presenting with complaints of bilateral low back pain radiating down both legs similar to prior chronic back pain for which she was given morphine x2 as well as Valium as a muscle relaxant. He had some improvement of his symptoms to the point where he was able to walk. I did not feel that this would need any further interventions or imaging at this time given the absence of other neurologic signs or symptoms. The patient also was complaining of some left-sided chest pain that was sharp in nature and worse with deep breathing. Initial chest x-ray showed some possible edema however CT of the chest was performed which showed nodules but no other acute findings. This scan had to be done without contrast due to the patient's chronic kidney disease and he was actually scheduled to have the CT performed later this week for follow-up with his cardiothoracic surgeon Dr. Keren Juarez. This study is unchanged with regard to prior imaging and his history of sinus Valsalva aneurysm and dilated aortic root. Troponin 0.02x2 unchanged and likely slightly elevated due to chronic kidney disease as it has been elevated in the past.  Patient will be discharged home and asked to follow-up with his primary care for his back and his cardiothoracic surgeon as planned for follow-up of the CT. He will be started on steroids and Valium for the chronic back pain. Clinical Impression     1. Acute exacerbation of chronic low back pain    2. Bilateral sciatica    3. Chest pain, unspecified type        Disposition     PATIENT REFERRED TO:  Your primary care      for back pain    Baxter Schlatter, MD  [address]  [address] [facility], [address]  [phone]      as planned    DISCHARGE MEDICATIONS:  New Prescriptions    DIAZEPAM (VALIUM) 5 MG TABLET    Take 1 tablet by mouth every 8 hours as needed (back pain) for up to 8 doses. METHYLPREDNISOLONE (MEDROL, JACE,) 4 MG TABLET    Take by mouth.        DISPOSITION Decision To Discharge 12/06/2022 05:39:19 PM         Efra Alexandre MD  12/06/22 0621

## 2022-12-09 ENCOUNTER — TELEPHONE (OUTPATIENT)
Dept: CARDIOTHORACIC SURGERY | Age: 67
End: 2022-12-09

## 2022-12-09 NOTE — TELEPHONE ENCOUNTER
Patient is in surveillance for hx of sinus valsalva aneurysm and dialted aortic root. Dr. Kim Julien reviewed CT chest w/o contrast performed on 12/6/22 and states that the aorta is stable and he would like to repeat the same study in 1 year. Patient is aware and agreeable with plan.

## (undated) DEVICE — FORCEPS BX L240CM DIA2.4MM L NDL RAD JAW 4 133340

## (undated) DEVICE — Device: Brand: SPOT EX ENDOSCOPIC TATTOO

## (undated) DEVICE — THE ARTICULATOR INJECTION NEEDLE IS A SINGLE USE, DISPOSABLE, FLEXIBLE SHEATH INJECTION NEEDLE USED FOR THE INJECTION OF VARIOUS TYPES OF MEDIA THROUGH FLEXIBLE ENDOSCOPES.: Brand: ARTICULATOR